# Patient Record
Sex: MALE | Race: BLACK OR AFRICAN AMERICAN | NOT HISPANIC OR LATINO | Employment: UNEMPLOYED | URBAN - METROPOLITAN AREA
[De-identification: names, ages, dates, MRNs, and addresses within clinical notes are randomized per-mention and may not be internally consistent; named-entity substitution may affect disease eponyms.]

---

## 2023-11-30 DIAGNOSIS — Z12.2 ENCOUNTER FOR SCREENING FOR LUNG CANCER: Primary | ICD-10-CM

## 2023-12-19 ENCOUNTER — HOSPITAL ENCOUNTER (OUTPATIENT)
Dept: RADIOLOGY | Facility: HOSPITAL | Age: 73
Discharge: HOME | End: 2023-12-19
Payer: MEDICARE

## 2023-12-19 DIAGNOSIS — Z12.2 ENCOUNTER FOR SCREENING FOR LUNG CANCER: ICD-10-CM

## 2023-12-19 PROCEDURE — 71271 CT THORAX LUNG CANCER SCR C-: CPT | Performed by: RADIOLOGY

## 2023-12-19 PROCEDURE — 71271 CT THORAX LUNG CANCER SCR C-: CPT

## 2024-02-01 DIAGNOSIS — Z12.2 ENCOUNTER FOR SCREENING FOR LUNG CANCER: Primary | ICD-10-CM

## 2024-08-11 ENCOUNTER — APPOINTMENT (OUTPATIENT)
Dept: RADIOLOGY | Facility: HOSPITAL | Age: 74
DRG: 682 | End: 2024-08-11
Payer: MEDICARE

## 2024-08-11 ENCOUNTER — APPOINTMENT (OUTPATIENT)
Dept: CARDIOLOGY | Facility: HOSPITAL | Age: 74
DRG: 682 | End: 2024-08-11
Payer: MEDICARE

## 2024-08-11 ENCOUNTER — HOSPITAL ENCOUNTER (INPATIENT)
Facility: HOSPITAL | Age: 74
End: 2024-08-11
Attending: EMERGENCY MEDICINE | Admitting: HOSPITALIST
Payer: MEDICARE

## 2024-08-11 VITALS
RESPIRATION RATE: 15 BRPM | HEART RATE: 81 BPM | TEMPERATURE: 97.3 F | OXYGEN SATURATION: 97 % | SYSTOLIC BLOOD PRESSURE: 153 MMHG | DIASTOLIC BLOOD PRESSURE: 114 MMHG | HEIGHT: 70 IN | BODY MASS INDEX: 34.44 KG/M2

## 2024-08-11 DIAGNOSIS — I10 PRIMARY HYPERTENSION: ICD-10-CM

## 2024-08-11 DIAGNOSIS — I50.33 ACUTE ON CHRONIC DIASTOLIC (CONGESTIVE) HEART FAILURE (MULTI): ICD-10-CM

## 2024-08-11 DIAGNOSIS — I50.9 ACUTE CONGESTIVE HEART FAILURE, UNSPECIFIED HEART FAILURE TYPE (MULTI): ICD-10-CM

## 2024-08-11 DIAGNOSIS — N17.9 ACUTE RENAL FAILURE, UNSPECIFIED ACUTE RENAL FAILURE TYPE (CMS-HCC): Primary | ICD-10-CM

## 2024-08-11 DIAGNOSIS — I21.4 NON-ST ELEVATION (NSTEMI) MYOCARDIAL INFARCTION (MULTI): ICD-10-CM

## 2024-08-11 DIAGNOSIS — M79.89 LEG SWELLING: ICD-10-CM

## 2024-08-11 DIAGNOSIS — K75.9 HEPATITIS: ICD-10-CM

## 2024-08-11 DIAGNOSIS — I16.1 HYPERTENSIVE EMERGENCY: ICD-10-CM

## 2024-08-11 DIAGNOSIS — F51.02 ADJUSTMENT INSOMNIA: ICD-10-CM

## 2024-08-11 DIAGNOSIS — F17.200 SMOKING: ICD-10-CM

## 2024-08-11 LAB
ACANTHOCYTES BLD QL SMEAR: ABNORMAL
ALBUMIN SERPL-MCNC: 4.1 G/DL (ref 3.5–5)
ALBUMIN SERPL-MCNC: 4.2 G/DL (ref 3.5–5)
ALP BLD-CCNC: 85 U/L (ref 35–125)
ALP BLD-CCNC: 87 U/L (ref 35–125)
ALT SERPL-CCNC: 314 U/L (ref 5–40)
ALT SERPL-CCNC: 368 U/L (ref 5–40)
ANION GAP SERPL CALC-SCNC: >19 MMOL/L
ANION GAP SERPL CALC-SCNC: >19 MMOL/L
APPEARANCE UR: CLEAR
APTT PPP: 29 SECONDS (ref 22–32.5)
AST SERPL-CCNC: 436 U/L (ref 5–40)
AST SERPL-CCNC: 544 U/L (ref 5–40)
BASOPHILS # BLD MANUAL: 0 X10*3/UL (ref 0–0.1)
BASOPHILS NFR BLD MANUAL: 0 %
BILIRUB DIRECT SERPL-MCNC: 0.8 MG/DL (ref 0–0.2)
BILIRUB SERPL-MCNC: 1.7 MG/DL (ref 0.1–1.2)
BILIRUB SERPL-MCNC: 1.9 MG/DL (ref 0.1–1.2)
BILIRUB UR STRIP.AUTO-MCNC: NEGATIVE MG/DL
BUN SERPL-MCNC: 44 MG/DL (ref 8–25)
BUN SERPL-MCNC: 44 MG/DL (ref 8–25)
BURR CELLS BLD QL SMEAR: ABNORMAL
CALCIUM SERPL-MCNC: 9.3 MG/DL (ref 8.5–10.4)
CALCIUM SERPL-MCNC: 9.8 MG/DL (ref 8.5–10.4)
CHLORIDE SERPL-SCNC: 98 MMOL/L (ref 97–107)
CHLORIDE SERPL-SCNC: 98 MMOL/L (ref 97–107)
CHOLEST SERPL-MCNC: 130 MG/DL (ref 133–200)
CHOLEST/HDLC SERPL: 5.9 {RATIO}
CK SERPL-CCNC: 308 U/L (ref 24–195)
CO2 SERPL-SCNC: 15 MMOL/L (ref 24–31)
CO2 SERPL-SCNC: 16 MMOL/L (ref 24–31)
COLOR UR: ABNORMAL
CREAT SERPL-MCNC: 3.9 MG/DL (ref 0.4–1.6)
CREAT SERPL-MCNC: 4.1 MG/DL (ref 0.4–1.6)
D DIMER PPP FEU-MCNC: 1.99 MG/L FEU (ref 0.19–0.5)
EGFRCR SERPLBLD CKD-EPI 2021: 15 ML/MIN/1.73M*2
EGFRCR SERPLBLD CKD-EPI 2021: 16 ML/MIN/1.73M*2
EOSINOPHIL # BLD MANUAL: 0 X10*3/UL (ref 0–0.4)
EOSINOPHIL NFR BLD MANUAL: 0 %
ERYTHROCYTE [DISTWIDTH] IN BLOOD BY AUTOMATED COUNT: 14.8 % (ref 11.5–14.5)
ERYTHROCYTE [DISTWIDTH] IN BLOOD BY AUTOMATED COUNT: 14.8 % (ref 11.5–14.5)
EST. AVERAGE GLUCOSE BLD GHB EST-MCNC: 91 MG/DL
GLUCOSE SERPL-MCNC: 104 MG/DL (ref 65–99)
GLUCOSE SERPL-MCNC: 122 MG/DL (ref 65–99)
GLUCOSE UR STRIP.AUTO-MCNC: NORMAL MG/DL
HAV IGM SER QL: NONREACTIVE
HBA1C MFR BLD: 4.8 %
HBV CORE IGM SER QL: NONREACTIVE
HBV SURFACE AG SERPL QL IA: NONREACTIVE
HCT VFR BLD AUTO: 38.4 % (ref 41–52)
HCT VFR BLD AUTO: 40.1 % (ref 41–52)
HCV AB SER QL: REACTIVE
HDLC SERPL-MCNC: 22 MG/DL
HGB BLD-MCNC: 13.2 G/DL (ref 13.5–17.5)
HGB BLD-MCNC: 13.7 G/DL (ref 13.5–17.5)
HOLD SPECIMEN: NORMAL
IMM GRANULOCYTES # BLD AUTO: 0.14 X10*3/UL (ref 0–0.5)
IMM GRANULOCYTES NFR BLD AUTO: 1.2 % (ref 0–0.9)
KETONES UR STRIP.AUTO-MCNC: NEGATIVE MG/DL
LDLC SERPL CALC-MCNC: 80 MG/DL (ref 65–130)
LEUKOCYTE ESTERASE UR QL STRIP.AUTO: NEGATIVE
LYMPHOCYTES # BLD MANUAL: 1.16 X10*3/UL (ref 0.8–3)
LYMPHOCYTES NFR BLD MANUAL: 10 %
MAGNESIUM SERPL-MCNC: 2.2 MG/DL (ref 1.6–3.1)
MCH RBC QN AUTO: 33.1 PG (ref 26–34)
MCH RBC QN AUTO: 33.3 PG (ref 26–34)
MCHC RBC AUTO-ENTMCNC: 34.2 G/DL (ref 32–36)
MCHC RBC AUTO-ENTMCNC: 34.4 G/DL (ref 32–36)
MCV RBC AUTO: 97 FL (ref 80–100)
MCV RBC AUTO: 97 FL (ref 80–100)
METAMYELOCYTES # BLD MANUAL: 0.12 X10*3/UL
METAMYELOCYTES NFR BLD MANUAL: 1 %
MONOCYTES # BLD MANUAL: 0.35 X10*3/UL (ref 0.05–0.8)
MONOCYTES NFR BLD MANUAL: 3 %
MUCOUS THREADS #/AREA URNS AUTO: NORMAL /LPF
NEUTS SEG # BLD MANUAL: 9.98 X10*3/UL (ref 1.6–5)
NEUTS SEG NFR BLD MANUAL: 86 %
NITRITE UR QL STRIP.AUTO: NEGATIVE
NRBC BLD-RTO: 0.8 /100 WBCS (ref 0–0)
NRBC BLD-RTO: 1 /100 WBCS (ref 0–0)
NT-PROBNP SERPL-MCNC: ABNORMAL PG/ML (ref 0–229)
PH UR STRIP.AUTO: 5.5 [PH]
PLATELET # BLD AUTO: 136 X10*3/UL (ref 150–450)
PLATELET # BLD AUTO: 144 X10*3/UL (ref 150–450)
POLYCHROMASIA BLD QL SMEAR: ABNORMAL
POTASSIUM SERPL-SCNC: 3.8 MMOL/L (ref 3.4–5.1)
POTASSIUM SERPL-SCNC: 3.9 MMOL/L (ref 3.4–5.1)
PROT SERPL-MCNC: 7.1 G/DL (ref 5.9–7.9)
PROT SERPL-MCNC: 7.6 G/DL (ref 5.9–7.9)
PROT UR STRIP.AUTO-MCNC: ABNORMAL MG/DL
RBC # BLD AUTO: 3.96 X10*6/UL (ref 4.5–5.9)
RBC # BLD AUTO: 4.14 X10*6/UL (ref 4.5–5.9)
RBC # UR STRIP.AUTO: ABNORMAL /UL
RBC #/AREA URNS AUTO: NORMAL /HPF
RBC MORPH BLD: ABNORMAL
SODIUM SERPL-SCNC: 136 MMOL/L (ref 133–145)
SODIUM SERPL-SCNC: 137 MMOL/L (ref 133–145)
SP GR UR STRIP.AUTO: 1.01
SQUAMOUS #/AREA URNS AUTO: NORMAL /HPF
TOTAL CELLS COUNTED BLD: 100
TRIGL SERPL-MCNC: 138 MG/DL (ref 40–150)
TROPONIN T SERPL-MCNC: 161 NG/L
TROPONIN T SERPL-MCNC: 171 NG/L
TROPONIN T SERPL-MCNC: 176 NG/L
UROBILINOGEN UR STRIP.AUTO-MCNC: NORMAL MG/DL
WBC # BLD AUTO: 11.6 X10*3/UL (ref 4.4–11.3)
WBC # BLD AUTO: 11.8 X10*3/UL (ref 4.4–11.3)
WBC #/AREA URNS AUTO: NORMAL /HPF

## 2024-08-11 PROCEDURE — 80053 COMPREHEN METABOLIC PANEL: CPT | Performed by: HOSPITALIST

## 2024-08-11 PROCEDURE — 85027 COMPLETE CBC AUTOMATED: CPT | Performed by: HOSPITALIST

## 2024-08-11 PROCEDURE — 99232 SBSQ HOSP IP/OBS MODERATE 35: CPT | Performed by: INTERNAL MEDICINE

## 2024-08-11 PROCEDURE — 2060000001 HC INTERMEDIATE ICU ROOM DAILY

## 2024-08-11 PROCEDURE — 71045 X-RAY EXAM CHEST 1 VIEW: CPT

## 2024-08-11 PROCEDURE — 93005 ELECTROCARDIOGRAM TRACING: CPT

## 2024-08-11 PROCEDURE — 36415 COLL VENOUS BLD VENIPUNCTURE: CPT | Performed by: EMERGENCY MEDICINE

## 2024-08-11 PROCEDURE — 83718 ASSAY OF LIPOPROTEIN: CPT | Performed by: HOSPITALIST

## 2024-08-11 PROCEDURE — 82550 ASSAY OF CK (CPK): CPT | Performed by: HOSPITALIST

## 2024-08-11 PROCEDURE — 85007 BL SMEAR W/DIFF WBC COUNT: CPT | Performed by: EMERGENCY MEDICINE

## 2024-08-11 PROCEDURE — 85300 ANTITHROMBIN III ACTIVITY: CPT | Performed by: EMERGENCY MEDICINE

## 2024-08-11 PROCEDURE — 70450 CT HEAD/BRAIN W/O DYE: CPT

## 2024-08-11 PROCEDURE — 87522 HEPATITIS C REVRS TRNSCRPJ: CPT | Mod: WESLAB | Performed by: HOSPITALIST

## 2024-08-11 PROCEDURE — 83835 ASSAY OF METANEPHRINES: CPT | Performed by: INTERNAL MEDICINE

## 2024-08-11 PROCEDURE — 76705 ECHO EXAM OF ABDOMEN: CPT

## 2024-08-11 PROCEDURE — 84244 ASSAY OF RENIN: CPT | Performed by: INTERNAL MEDICINE

## 2024-08-11 PROCEDURE — 76705 ECHO EXAM OF ABDOMEN: CPT | Performed by: RADIOLOGY

## 2024-08-11 PROCEDURE — 99291 CRITICAL CARE FIRST HOUR: CPT | Performed by: HOSPITALIST

## 2024-08-11 PROCEDURE — 2500000001 HC RX 250 WO HCPCS SELF ADMINISTERED DRUGS (ALT 637 FOR MEDICARE OP): Performed by: NURSE PRACTITIONER

## 2024-08-11 PROCEDURE — 85730 THROMBOPLASTIN TIME PARTIAL: CPT | Performed by: EMERGENCY MEDICINE

## 2024-08-11 PROCEDURE — 82088 ASSAY OF ALDOSTERONE: CPT | Performed by: INTERNAL MEDICINE

## 2024-08-11 PROCEDURE — 82248 BILIRUBIN DIRECT: CPT | Performed by: INTERNAL MEDICINE

## 2024-08-11 PROCEDURE — 99291 CRITICAL CARE FIRST HOUR: CPT | Mod: 25 | Performed by: EMERGENCY MEDICINE

## 2024-08-11 PROCEDURE — 99285 EMERGENCY DEPT VISIT HI MDM: CPT | Mod: 25

## 2024-08-11 PROCEDURE — 76770 US EXAM ABDO BACK WALL COMP: CPT

## 2024-08-11 PROCEDURE — 93971 EXTREMITY STUDY: CPT | Performed by: RADIOLOGY

## 2024-08-11 PROCEDURE — 76770 US EXAM ABDO BACK WALL COMP: CPT | Performed by: RADIOLOGY

## 2024-08-11 PROCEDURE — 84484 ASSAY OF TROPONIN QUANT: CPT | Performed by: EMERGENCY MEDICINE

## 2024-08-11 PROCEDURE — 2500000004 HC RX 250 GENERAL PHARMACY W/ HCPCS (ALT 636 FOR OP/ED): Performed by: INTERNAL MEDICINE

## 2024-08-11 PROCEDURE — 83036 HEMOGLOBIN GLYCOSYLATED A1C: CPT | Performed by: HOSPITALIST

## 2024-08-11 PROCEDURE — 80074 ACUTE HEPATITIS PANEL: CPT | Mod: WESLAB | Performed by: HOSPITALIST

## 2024-08-11 PROCEDURE — 81001 URINALYSIS AUTO W/SCOPE: CPT | Performed by: HOSPITALIST

## 2024-08-11 PROCEDURE — 2500000001 HC RX 250 WO HCPCS SELF ADMINISTERED DRUGS (ALT 637 FOR MEDICARE OP): Performed by: HOSPITALIST

## 2024-08-11 PROCEDURE — 99223 1ST HOSP IP/OBS HIGH 75: CPT | Performed by: NURSE PRACTITIONER

## 2024-08-11 PROCEDURE — 83735 ASSAY OF MAGNESIUM: CPT | Performed by: EMERGENCY MEDICINE

## 2024-08-11 PROCEDURE — 2500000001 HC RX 250 WO HCPCS SELF ADMINISTERED DRUGS (ALT 637 FOR MEDICARE OP): Performed by: INTERNAL MEDICINE

## 2024-08-11 PROCEDURE — 85027 COMPLETE CBC AUTOMATED: CPT | Performed by: EMERGENCY MEDICINE

## 2024-08-11 PROCEDURE — 2500000004 HC RX 250 GENERAL PHARMACY W/ HCPCS (ALT 636 FOR OP/ED): Performed by: EMERGENCY MEDICINE

## 2024-08-11 PROCEDURE — 83880 ASSAY OF NATRIURETIC PEPTIDE: CPT | Performed by: EMERGENCY MEDICINE

## 2024-08-11 PROCEDURE — 71045 X-RAY EXAM CHEST 1 VIEW: CPT | Performed by: RADIOLOGY

## 2024-08-11 PROCEDURE — 93010 ELECTROCARDIOGRAM REPORT: CPT | Performed by: INTERNAL MEDICINE

## 2024-08-11 PROCEDURE — 70450 CT HEAD/BRAIN W/O DYE: CPT | Performed by: RADIOLOGY

## 2024-08-11 PROCEDURE — 2500000004 HC RX 250 GENERAL PHARMACY W/ HCPCS (ALT 636 FOR OP/ED): Performed by: HOSPITALIST

## 2024-08-11 PROCEDURE — 93970 EXTREMITY STUDY: CPT

## 2024-08-11 PROCEDURE — 80053 COMPREHEN METABOLIC PANEL: CPT | Performed by: EMERGENCY MEDICINE

## 2024-08-11 RX ORDER — AMLODIPINE BESYLATE 10 MG/1
10 TABLET ORAL DAILY
Status: DISPENSED | OUTPATIENT
Start: 2024-08-11

## 2024-08-11 RX ORDER — POLYETHYLENE GLYCOL 3350 17 G/17G
17 POWDER, FOR SOLUTION ORAL DAILY PRN
Status: ACTIVE | OUTPATIENT
Start: 2024-08-11

## 2024-08-11 RX ORDER — NITROGLYCERIN 20 MG/100ML
1000 INJECTION INTRAVENOUS ONCE
Status: DISCONTINUED | OUTPATIENT
Start: 2024-08-11 | End: 2024-08-11 | Stop reason: ENTERED-IN-ERROR

## 2024-08-11 RX ORDER — ACETAMINOPHEN 325 MG/1
650 TABLET ORAL EVERY 6 HOURS PRN
Status: ACTIVE | OUTPATIENT
Start: 2024-08-11

## 2024-08-11 RX ORDER — HEPARIN SODIUM 5000 [USP'U]/ML
3000-6000 INJECTION, SOLUTION INTRAVENOUS; SUBCUTANEOUS AS NEEDED
Status: ACTIVE | OUTPATIENT
Start: 2024-08-11

## 2024-08-11 RX ORDER — SODIUM BICARBONATE 650 MG/1
650 TABLET ORAL 2 TIMES DAILY
Status: DISPENSED | OUTPATIENT
Start: 2024-08-11

## 2024-08-11 RX ORDER — FUROSEMIDE 10 MG/ML
40 INJECTION INTRAMUSCULAR; INTRAVENOUS ONCE
Status: COMPLETED | OUTPATIENT
Start: 2024-08-11 | End: 2024-08-11

## 2024-08-11 RX ORDER — IBUPROFEN 200 MG
1 TABLET ORAL DAILY
Status: DISPENSED | OUTPATIENT
Start: 2024-08-11

## 2024-08-11 RX ORDER — ONDANSETRON HYDROCHLORIDE 2 MG/ML
4 INJECTION, SOLUTION INTRAVENOUS EVERY 6 HOURS PRN
Status: ACTIVE | OUTPATIENT
Start: 2024-08-11

## 2024-08-11 RX ORDER — CARVEDILOL 12.5 MG/1
12.5 TABLET ORAL
Status: DISPENSED | OUTPATIENT
Start: 2024-08-11

## 2024-08-11 RX ORDER — HYDRALAZINE HYDROCHLORIDE 25 MG/1
25 TABLET, FILM COATED ORAL 3 TIMES DAILY
Status: DISPENSED | OUTPATIENT
Start: 2024-08-11

## 2024-08-11 RX ORDER — ASPIRIN 81 MG/1
81 TABLET ORAL DAILY
Status: DISPENSED | OUTPATIENT
Start: 2024-08-11

## 2024-08-11 RX ORDER — HEPARIN SODIUM 10000 [USP'U]/100ML
0-4500 INJECTION, SOLUTION INTRAVENOUS CONTINUOUS
Status: DISCONTINUED | OUTPATIENT
Start: 2024-08-11 | End: 2024-08-11

## 2024-08-11 RX ORDER — HEPARIN SODIUM 5000 [USP'U]/ML
80 INJECTION, SOLUTION INTRAVENOUS; SUBCUTANEOUS ONCE
Status: COMPLETED | OUTPATIENT
Start: 2024-08-11 | End: 2024-08-11

## 2024-08-11 RX ORDER — HYDRALAZINE HYDROCHLORIDE 10 MG/1
10 TABLET, FILM COATED ORAL 3 TIMES DAILY
Status: DISCONTINUED | OUTPATIENT
Start: 2024-08-11 | End: 2024-08-11

## 2024-08-11 RX ADMIN — FUROSEMIDE 40 MG: 10 INJECTION, SOLUTION INTRAMUSCULAR; INTRAVENOUS at 03:58

## 2024-08-11 RX ADMIN — ASPIRIN 81 MG: 81 TABLET, COATED ORAL at 09:42

## 2024-08-11 RX ADMIN — HEPARIN SODIUM 2000 UNITS/HR: 10000 INJECTION, SOLUTION INTRAVENOUS at 03:58

## 2024-08-11 RX ADMIN — CARVEDILOL 12.5 MG: 12.5 TABLET, FILM COATED ORAL at 06:37

## 2024-08-11 RX ADMIN — CARVEDILOL 12.5 MG: 12.5 TABLET, FILM COATED ORAL at 16:53

## 2024-08-11 RX ADMIN — HYDRALAZINE HYDROCHLORIDE 25 MG: 25 TABLET ORAL at 18:07

## 2024-08-11 RX ADMIN — HYDRALAZINE HYDROCHLORIDE 25 MG: 25 TABLET ORAL at 23:15

## 2024-08-11 RX ADMIN — SODIUM BICARBONATE 650 MG: 650 TABLET ORAL at 12:26

## 2024-08-11 RX ADMIN — HEPARIN SODIUM 8750 UNITS: 5000 INJECTION, SOLUTION INTRAVENOUS; SUBCUTANEOUS at 03:58

## 2024-08-11 RX ADMIN — AMLODIPINE BESYLATE 10 MG: 10 TABLET ORAL at 05:13

## 2024-08-11 RX ADMIN — SODIUM BICARBONATE 650 MG: 650 TABLET ORAL at 23:15

## 2024-08-11 RX ADMIN — HYDRALAZINE HYDROCHLORIDE 10 MG: 10 TABLET ORAL at 09:49

## 2024-08-11 ASSESSMENT — ENCOUNTER SYMPTOMS
DIZZINESS: 0
HEMATURIA: 0
NAUSEA: 0
POLYPHAGIA: 0
VOMITING: 0
POLYDIPSIA: 0
HEADACHES: 1
CONFUSION: 0
APPETITE CHANGE: 1
COLOR CHANGE: 0
DYSURIA: 0
FATIGUE: 1
MYALGIAS: 0
SINUS PAIN: 0
DIARRHEA: 0
WEAKNESS: 1
DIARRHEA: 1
CHEST TIGHTNESS: 0
HALLUCINATIONS: 0
ARTHRALGIAS: 0
ABDOMINAL PAIN: 0
CONSTIPATION: 0
SHORTNESS OF BREATH: 1
CHILLS: 0
FEVER: 0

## 2024-08-11 ASSESSMENT — LIFESTYLE VARIABLES
TOTAL SCORE: 0
EVER FELT BAD OR GUILTY ABOUT YOUR DRINKING: NO
EVER HAD A DRINK FIRST THING IN THE MORNING TO STEADY YOUR NERVES TO GET RID OF A HANGOVER: NO
HAVE PEOPLE ANNOYED YOU BY CRITICIZING YOUR DRINKING: NO
HAVE YOU EVER FELT YOU SHOULD CUT DOWN ON YOUR DRINKING: NO

## 2024-08-11 ASSESSMENT — PAIN - FUNCTIONAL ASSESSMENT: PAIN_FUNCTIONAL_ASSESSMENT: 0-10

## 2024-08-11 ASSESSMENT — COLUMBIA-SUICIDE SEVERITY RATING SCALE - C-SSRS
2. HAVE YOU ACTUALLY HAD ANY THOUGHTS OF KILLING YOURSELF?: NO
6. HAVE YOU EVER DONE ANYTHING, STARTED TO DO ANYTHING, OR PREPARED TO DO ANYTHING TO END YOUR LIFE?: NO
1. IN THE PAST MONTH, HAVE YOU WISHED YOU WERE DEAD OR WISHED YOU COULD GO TO SLEEP AND NOT WAKE UP?: NO

## 2024-08-11 ASSESSMENT — PAIN SCALES - GENERAL: PAINLEVEL_OUTOF10: 0 - NO PAIN

## 2024-08-11 NOTE — CONSULTS
Inpatient consult to Cardiology  Consult performed by: MICHEL Gomez-CNP  Consult ordered by: Mary aBtres DO  Reason for consult: Elevated high-sensitivity troponin, hypertensive emergency        History Of Present Illness:    Estuardo El is a 74 y.o. male presenting with generalized weakness and fatigue.  No cardiologist.  Past medical history of diastolic heart failure, hepatitis C, and hypertensive disorder, tobacco use.  Patient appears to be a poor historian does not recall these previous diagnosis and is not taking medications at home.  Presents with shortness of breath as well as significant fatigue and exertional shortness of breath over the past few weeks.  Lately he has become incontinent because he is unable to get up to the bathroom.  Patient reports no chest pain, palpitations or feeling of rapid heart rate.  EKG in the emergency department is a sinus tachycardia at 105 beats per minute.  Chest x-ray clear.  proBNP 70,000, high-sensitivity troponin values of 171 and 161.  Creatinine 4.1.  Hemoglobin 13.2.  Patient presents with systolic blood pressure over 200 and with initial treatment this improved to 199/143.  In the emergency department patient received 1 dose of IV furosemide and a bolus of heparin, was placed on a heparin drip and admitted on telemetry for further testing and treatment.      Last Recorded Vitals:  Vitals:    08/11/24 0400 08/11/24 0513 08/11/24 0543 08/11/24 0637   BP:  (!) 204/164 (!) 193/161 (!) 199/143   BP Location:   Right arm    Patient Position:   Lying    Pulse: (!) 111 (!) 109 (!) 107 (!) 107   Resp: (!) 27  (!) 21    Temp:       TempSrc:       SpO2: 99%  97%    Height:           Last Labs:  CBC - 8/11/2024:  4:15 AM  11.8 13.2 136    38.4      CMP - 8/11/2024:  4:15 AM  9.3 7.1 544 --- 1.7   _ 4.1 368 85      PTT - 8/11/2024:  1:20 AM  _   _ 29.0     BNP   Date/Time Value Ref Range Status   07/19/2020 06:55  (H) 0 - 99 pg/mL Final      Comment:     .  <100 pg/mL - Heart failure unlikely  100-299 pg/mL - Intermediate probability of acute heart  .               failure exacerbation. Correlate with clinical  .               context and patient history.    >=300 pg/mL - Heart Failure likely. Correlate with clinical  .               context and patient history.  BNP testing is performed using different testing   methodology at [facility] than at other   [facility]. Direct result comparisons should   only be made within the same method.       Hemoglobin A1C   Date/Time Value Ref Range Status   08/11/2024 01:20 AM 4.8 See below % Final   02/27/2019 11:09 AM 5.3 % Final     Comment:          Diagnosis of Diabetes-Adults   Non-Diabetic: < or = 5.6%   Increased risk for developing diabetes: 5.7-6.4%   Diagnostic of diabetes: > or = 6.5%  .       Monitoring of Diabetes                Age (y)     Therapeutic Goal (%)   Adults:          >18           <7.0   Pediatrics:    13-18           <7.5                   7-12           <8.0                   0- 6            7.5-8.5   American Diabetes Association. Diabetes Care 33(S1), Jan 2010.       LDL Calculated   Date/Time Value Ref Range Status   08/11/2024 04:15 AM 80 65 - 130 mg/dL Final     VLDL   Date/Time Value Ref Range Status   02/27/2019 11:09 AM 16 0 - 40 mg/dL Final      Results for orders placed or performed during the hospital encounter of 08/11/24 (from the past 24 hour(s))   CBC and Auto Differential   Result Value Ref Range    WBC 11.6 (H) 4.4 - 11.3 x10*3/uL    nRBC 1.0 (H) 0.0 - 0.0 /100 WBCs    RBC 4.14 (L) 4.50 - 5.90 x10*6/uL    Hemoglobin 13.7 13.5 - 17.5 g/dL    Hematocrit 40.1 (L) 41.0 - 52.0 %    MCV 97 80 - 100 fL    MCH 33.1 26.0 - 34.0 pg    MCHC 34.2 32.0 - 36.0 g/dL    RDW 14.8 (H) 11.5 - 14.5 %    Platelets 144 (L) 150 - 450 x10*3/uL    Immature Granulocytes %, Automated 1.2 (H) 0.0 - 0.9 %    Immature Granulocytes Absolute, Automated 0.14 0.00 - 0.50 x10*3/uL    Comprehensive Metabolic Panel   Result Value Ref Range    Glucose 122 (H) 65 - 99 mg/dL    Sodium 137 133 - 145 mmol/L    Potassium 3.8 3.4 - 5.1 mmol/L    Chloride 98 97 - 107 mmol/L    Bicarbonate 15 (L) 24 - 31 mmol/L    Urea Nitrogen 44 (H) 8 - 25 mg/dL    Creatinine 3.90 (H) 0.40 - 1.60 mg/dL    eGFR 16 (L) >60 mL/min/1.73m*2    Calcium 9.8 8.5 - 10.4 mg/dL    Albumin 4.2 3.5 - 5.0 g/dL    Alkaline Phosphatase 87 35 - 125 U/L    Total Protein 7.6 5.9 - 7.9 g/dL     (H) 5 - 40 U/L    Bilirubin, Total 1.9 (H) 0.1 - 1.2 mg/dL     (H) 5 - 40 U/L    Anion Gap >19 (H) <=19 mmol/L   Magnesium   Result Value Ref Range    Magnesium 2.20 1.60 - 3.10 mg/dL   NT Pro-BNP   Result Value Ref Range    PROBNP >70,000 (H) 0 - 229 pg/mL   D-dimer, quantitative   Result Value Ref Range    D-Dimer Non VTE, Quant (mg/L FEU) 1.99 (H) 0.19 - 0.50 mg/L FEU   Serial Troponin, Initial ([facility])   Result Value Ref Range    Troponin T, High Sensitivity 171 (HH) <=14 ng/L   Manual Differential   Result Value Ref Range    Neutrophils %, Manual 86.0 40.0 - 80.0 %    Lymphocytes %, Manual 10.0 13.0 - 44.0 %    Monocytes %, Manual 3.0 2.0 - 10.0 %    Eosinophils %, Manual 0.0 0.0 - 6.0 %    Basophils %, Manual 0.0 0.0 - 2.0 %    Metamyelocytes %, Manual 1.0 0.0 - 0.0 %    Seg Neutrophils Absolute, Manual 9.98 (H) 1.60 - 5.00 x10*3/uL    Lymphocytes Absolute, Manual 1.16 0.80 - 3.00 x10*3/uL    Monocytes Absolute, Manual 0.35 0.05 - 0.80 x10*3/uL    Eosinophils Absolute, Manual 0.00 0.00 - 0.40 x10*3/uL    Basophils Absolute, Manual 0.00 0.00 - 0.10 x10*3/uL    Metamyelocytes Absolute, Manual 0.12 0.00 - 0.00 x10*3/uL    Total Cells Counted 100     RBC Morphology See Below     Polychromasia Mild     Laurel Cells Few     Acanthocytes Few    aPTT   Result Value Ref Range    aPTT 29.0 22.0 - 32.5 seconds   Hemoglobin A1c   Result Value Ref Range    Hemoglobin A1C 4.8 See below %    Estimated Average Glucose 91 Not Established mg/dL    Serial Troponin, 2 Hour ([facility])   Result Value Ref Range    Troponin T, High Sensitivity 161 (HH) <=14 ng/L   Creatine Kinase   Result Value Ref Range    Creatine Kinase 308 (H) 24 - 195 U/L   CBC   Result Value Ref Range    WBC 11.8 (H) 4.4 - 11.3 x10*3/uL    nRBC 0.8 (H) 0.0 - 0.0 /100 WBCs    RBC 3.96 (L) 4.50 - 5.90 x10*6/uL    Hemoglobin 13.2 (L) 13.5 - 17.5 g/dL    Hematocrit 38.4 (L) 41.0 - 52.0 %    MCV 97 80 - 100 fL    MCH 33.3 26.0 - 34.0 pg    MCHC 34.4 32.0 - 36.0 g/dL    RDW 14.8 (H) 11.5 - 14.5 %    Platelets 136 (L) 150 - 450 x10*3/uL   Comprehensive Metabolic Panel   Result Value Ref Range    Glucose 104 (H) 65 - 99 mg/dL    Sodium 136 133 - 145 mmol/L    Potassium 3.9 3.4 - 5.1 mmol/L    Chloride 98 97 - 107 mmol/L    Bicarbonate 16 (L) 24 - 31 mmol/L    Urea Nitrogen 44 (H) 8 - 25 mg/dL    Creatinine 4.10 (H) 0.40 - 1.60 mg/dL    eGFR 15 (L) >60 mL/min/1.73m*2    Calcium 9.3 8.5 - 10.4 mg/dL    Albumin 4.1 3.5 - 5.0 g/dL    Alkaline Phosphatase 85 35 - 125 U/L    Total Protein 7.1 5.9 - 7.9 g/dL     (H) 5 - 40 U/L    Bilirubin, Total 1.7 (H) 0.1 - 1.2 mg/dL     (H) 5 - 40 U/L    Anion Gap >19 (H) <=19 mmol/L   Lipid Panel   Result Value Ref Range    Cholesterol 130 (L) 133 - 200 mg/dL    HDL-Cholesterol 22.0 (L) >40.0 mg/dL    Cholesterol/HDL Ratio 5.9 SEE COMMENT    LDL Calculated 80 65 - 130 mg/dL    Triglycerides 138 40 - 150 mg/dL       Last I/O:  No intake/output data recorded.    Past Cardiology Tests (Last 3 Years):  EKG: Sinus tachycardia 105 bpm      Past Medical History:  He has a past medical history of CHF (congestive heart failure) (Multi), Hepatitis C, and Hypertension.  Tobacco use    Past Surgical History:  He has a past surgical history that includes Back surgery.      Social History:  He reports that he has been smoking cigarettes. He started smoking about 57 years ago. He has a 57.6 pack-year smoking history. He has never used smokeless tobacco. He reports that he  does not currently use alcohol. No history on file for drug use.    Family History:  Family History   Problem Relation Name Age of Onset    Heart attack Father      Heart attack Brother          Allergies:  Patient has no known allergies.    Inpatient Medications:  Scheduled medications   Medication Dose Route Frequency    amLODIPine  10 mg oral Daily    aspirin  81 mg oral Daily    carvedilol  12.5 mg oral BID    hydrALAZINE  10 mg oral TID    nicotine  1 patch transdermal Daily    perflutren lipid microspheres  0.5-10 mL of dilution intravenous Once in imaging    perflutren protein A microsphere  0.5 mL intravenous Once in imaging    sulfur hexafluoride microsphr  2 mL intravenous Once in imaging     PRN medications   Medication    acetaminophen    heparin (porcine)    ondansetron    polyethylene glycol     Continuous Medications   Medication Dose Last Rate     Outpatient Medications:  No current outpatient medications    Physical Exam:  General: alert, oriented x 3, ill-appearing  HEENT: normal cephalic, atraumatic, no scleral icterus  Neck: No JVD, bruit or thrill, masses or tenderness   Heart: S1/S2, Rate 90, Rhythm regular, no s3 or s4, no murmur, thrill, or heaves at PMI.   Lungs: Clear, equal expansion and excursion, no wheezes, crackles, rhales or rhonci.  Room air.  No conversational dyspnea appreciated.  No tachypnea.  No pain with deep aspiration  Abdomen: Thin, bowel sounds x 4, soft, non-tender   Genitourinary: deferred   Extremities: No significant upper or lower extremity edema appreciated       Assessment/Plan     Hypertensive emergency  Acute kidney injury  Elevated high sensitive troponin  Generalized weakness  Tobacco use  Chronic diastolic heart failure    Overall impression:    8/11: Consulted for congestive heart failure and NSTEMI.  The patient's symptoms are generalized weakness and fatigue.  Reports no chest pain and some mild shortness of breath/fatigue over the past few weeks.  Chest  x-ray is clear.  There is no evidence of fluid volume overload on auscultation.  There is no JVD.  There is no peripheral edema.  Despite this his proBNP is 70,000.  Additionally he has noted to have an acute kidney injury with a creatinine of 4.1 which is new for this patient.  Upon further review the patient has severely hypertensive with a systolic averaging over 200.  There is a history of hypertension, however the patient does not take medications in the outpatient setting.  It appears the patient is having a hypertensive emergency resulting in elevated proBNP and elevated high-sensitivity troponin; and perhaps hypertensive cardiomyopathy.  Certainly there is also suspicion given the elevated blood pressure of renal artery stenosis.  Will check echocardiogram.  Renal artery stenosis study has been ordered.  At this point given lack of chest pain symptoms we will stop heparin drip.  Agree with amlodipine as first agent.  Would avoid ACEs and ARB's.  Agree with carvedilol.  I have added hydralazine for further blood pressure management.  Patient is significantly decompensated from a cardiac perspective given his hypertensive emergency, and a high risk for further decompensation.  Will follow with you.       Code Status:  Full Code    I spent 80 minutes in the professional and overall care of this patient.        Juan J Gaines, APRN-CNP

## 2024-08-11 NOTE — ED PROVIDER NOTES
HPI   Chief Complaint   Patient presents with    Weakness, Gen     Generalized weakness last two days, with increased leg swelling    Leg Swelling       74-year-old male with a history of nicotine abuse comes to the emergency department brought in by his son with a chief complaint of shortness of breath.  Son states that he visits his father daily to check up on him and today noticed an acute deterioration.  He states that the air conditioner was not working correctly and it was giving out heat.  He is concerned that his father is dehydrated.  The patient states that his legs have been getting more swollen for an unknown amount of time and that he has been having worsening dyspnea on exertion and orthopnea.  He states today he was unable to lie down without getting short of breath.      History provided by:  Patient   used: No            Patient History   Past Medical History:   Diagnosis Date    CHF (congestive heart failure) (Multi)     Hepatitis C     Hypertension      Past Surgical History:   Procedure Laterality Date    BACK SURGERY       Family History   Problem Relation Name Age of Onset    Heart attack Father      Heart attack Brother       Social History     Tobacco Use    Smoking status: Every Day     Current packs/day: 1.00     Average packs/day: 1 pack/day for 57.7 years (57.7 ttl pk-yrs)     Types: Cigarettes     Start date: 1967    Smokeless tobacco: Never   Substance Use Topics    Alcohol use: Not Currently    Drug use: Not on file       Physical Exam   ED Triage Vitals [08/11/24 0036]   Temperature Heart Rate Respirations BP   36.3 °C (97.3 °F) (!) 116 17 (!) 189/152      Pulse Ox Temp Source Heart Rate Source Patient Position   100 % Temporal Monitor --      BP Location FiO2 (%)     -- --       Physical Exam  Vitals and nursing note reviewed.   Constitutional:       General: He is not in acute distress.     Appearance: He is well-developed.   HENT:      Head: Normocephalic and  atraumatic.   Eyes:      Conjunctiva/sclera: Conjunctivae normal.   Cardiovascular:      Rate and Rhythm: Regular rhythm. Tachycardia present.      Heart sounds: No murmur heard.  Pulmonary:      Effort: No respiratory distress.      Breath sounds: No stridor. Rales present. No wheezing or rhonchi.   Chest:      Chest wall: No tenderness.   Abdominal:      Palpations: Abdomen is soft.      Tenderness: There is no abdominal tenderness.   Musculoskeletal:      Cervical back: Neck supple.      Right lower leg: Edema present.      Left lower leg: Edema present.   Skin:     General: Skin is warm and dry.      Capillary Refill: Capillary refill takes less than 2 seconds.   Neurological:      Mental Status: He is alert.   Psychiatric:         Mood and Affect: Mood normal.           ED Course & MDM   ED Course as of 09/07/24 1014   Sun Aug 11, 2024   0345 ECG 12 lead  ECG performed on August 11, 2024 at 1:07 AM and interpreted by me at 1:11 AM showing a sinus tachycardia with a ventricular rate of 105, no axis deviation, LVH, no STEMI.  T wave inversions in 2 3 and aVF's as well as lateral leads with depressions concerning for inferior lateral ischemia.  Significant changes when compared with previous on July 20, 2020 [EG]      ED Course User Index  [EG] Elida Jean Baptiste MD         Diagnoses as of 09/07/24 1014   Acute renal failure, unspecified acute renal failure type (CMS-HCC)   Hepatitis   Hypertensive emergency   Acute congestive heart failure, unspecified heart failure type (Multi)   Smoking   Leg swelling   Non-ST elevation (NSTEMI) myocardial infarction (Multi)                 No data recorded     Bia Coma Scale Score: 15 (08/22/24 1100 : Stephanie Holley RN)                           Medical Decision Making    HPI:  As Above  PMHx/PSHx/Meds/Allergies/SH/FH as per nursing documentation and reviewed.  Review of systems: Total of 10 systems reviewed and otherwise negative except as noted elsewhere    DDX: As  described in MDM    If performed, radiology listed above interpreted by me and confirmed by the Radiologist.  Medications administered during this visit (name and route): see MAR  Social determinants of health considered for this visit: lives at home  If performed, EKG interpreted by me and detailed above    [facility] Summary/considerations:  75 yo m presenting with vague complaints at the insistance of his son. Pt denies medical history, but appears to be a poor historian. His workup today is concerning for several emergent conditions. CXR BNP and signs and symptoms of CHF. Ordered lasix. Acute kidney injury. Pt states still making urine and no hyperkalemia. Emergent dialysis not indicated at this time. Elevated liver enzymes with uncertain etiology. Pt will be admitted to the hospitalist service    The patient was seen and triaged by our nursing/medic staff, their vitals were taken and the staff notes were reviewed.  If the patient arrived by an EMS squad or an outside agency, we discussed the case with transporting EMS medic, police, or other historians. My initial assessment was attention to their airway, breathing, and circulatory status.  We addressed any immediate or life threatening findings and completed a medical history and a physical exam if the patient or those legally responsible were in agreement with this.   **Disclaimer:  This note was dictated by speech recognition technology.  Minor errors in transcription may be present.  Please contact for clarification or corrections.      Amount and/or Complexity of Data Reviewed  Labs: ordered. Decision-making details documented in ED Course.  Radiology: ordered and independent interpretation performed. Decision-making details documented in ED Course.  ECG/medicine tests: ordered and independent interpretation performed. Decision-making details documented in ED Course.        Procedure  Critical Care    Performed by: Elida Jean Baptiste MD  Authorized by:  Elida Jean Baptiste MD    Critical care provider statement:     Critical care time (minutes):  31    Critical care time was exclusive of:  Separately billable procedures and treating other patients    Critical care was necessary to treat or prevent imminent or life-threatening deterioration of the following conditions:  Cardiac failure and metabolic crisis    Critical care was time spent personally by me on the following activities:  Development of treatment plan with patient or surrogate, evaluation of patient's response to treatment, examination of patient, obtaining history from patient or surrogate, ordering and performing treatments and interventions, ordering and review of laboratory studies, ordering and review of radiographic studies, pulse oximetry, re-evaluation of patient's condition and review of old charts    Care discussed with: admitting provider         Elida Jean Baptiste MD  08/16/24 1006       Elida Jean Baptiste MD  09/07/24 1014

## 2024-08-11 NOTE — PROGRESS NOTES
"Estuardo El is a 74 y.o. male on day 0 of admission presenting with shortness of breath, hypertensive urgency, acute kidney injury, elevated liver enzymes and    Subjective   He was awake, said he felt okay.  He previously had blood pressures as high as 210/148.  At the time of this encounter, the most recent blood pressure on his monitor was 150/115.     Objective     Physical Exam  General: awake, responsive, not in acute respiratory distress.   Cardiovascular: regular, normal S1 and S2  Lungs: good air entry bilaterally, clear to auscultation  Abdomen: soft, nontender, bowel sounds present, normoactive  Extremities: no peripheral cyanosis, no pedal edema  Neuro: awake, oriented x 3, no focal weakness    Last Recorded Vitals  Blood pressure (!) 199/143, pulse (!) 107, temperature 36.3 °C (97.3 °F), temperature source Temporal, resp. rate (!) 21, height 1.778 m (5' 10\"), SpO2 97%.  Intake/Output last 3 Shifts:  No intake/output data recorded.    Relevant Results  Lab Results   Component Value Date    WBC 11.8 (H) 08/11/2024    HGB 13.2 (L) 08/11/2024    HCT 38.4 (L) 08/11/2024    MCV 97 08/11/2024     (L) 08/11/2024     Lab Results   Component Value Date    GLUCOSE 104 (H) 08/11/2024    CALCIUM 9.3 08/11/2024     08/11/2024    K 3.9 08/11/2024    CO2 16 (L) 08/11/2024    CL 98 08/11/2024    BUN 44 (H) 08/11/2024    CREATININE 4.10 (H) 08/11/2024     Scheduled medications  amLODIPine, 10 mg, oral, Daily  aspirin, 81 mg, oral, Daily  carvedilol, 12.5 mg, oral, BID  hydrALAZINE, 10 mg, oral, TID  nicotine, 1 patch, transdermal, Daily  perflutren lipid microspheres, 0.5-10 mL of dilution, intravenous, Once in imaging  perflutren protein A microsphere, 0.5 mL, intravenous, Once in imaging  sulfur hexafluoride microsphr, 2 mL, intravenous, Once in imaging      Continuous medications     PRN medications  PRN medications: acetaminophen, heparin (porcine), ondansetron, polyethylene glycol      Assessment/Plan "   Acute kidney failure  Metabolic acidosis  Creatinine was 3.9 at admission, increased to 4.1.  It was 1.18 in 7/2020  Renal ultrasound  Fluid intake and output  Nephrology consult    Elevated troponin  First 2 troponin levels were 171 and 161.  Possible causes: Myocardial strain versus NSTEMI versus kidney failure  Repeat troponin level is pending  Cardiology consulted    Hypertensive urgency  On amlodipine and hydralazine.  Blood pressure has improved.  May need additional medication later for adequate blood pressure control.  Will avoid excessive lowering of blood pressure    Elevated proBNP  May be related to elevated BP versus possibleCHF  Elevated at greater than 70,000  Echocardiogram pending    Abnormal results of liver function study  AST and ALT are elevated, alkaline phosphatase is normal.  There is hyperbilirubinemia  Further history of hepatitis C.    Hepatitis panel and right upper quadrant ultrasound are pending  Check direct bilirubin    Tobacco use  Nicotine patch ordered    Leukocytosis  Not, probably stress-induced, no acute infection identified.    Philipp Bowen MD

## 2024-08-11 NOTE — CONSULTS
Consults    Reason For Consult  Elevated LFTs     History Of Present Illness  Estuardo El is a 74 y.o. male presenting with weakness, CHF, NSTEMI. Found evidence elevated LFTs:   Alk Phos 85 Total Bili 1.7. He mentions hx of alcohol abuse many years ago. No current usage. He denies new medications or antibiotics. Hx Hep C. Pending acute hep panel. He denies any abdominal pain, nausea, vomiting. Denies dizziness or syncope. INR is pending     Past Medical History  He has a past medical history of CHF (congestive heart failure) (Multi), Hepatitis C, and Hypertension.    Surgical History  He has a past surgical history that includes Back surgery.     Social History  He reports that he has been smoking cigarettes. He started smoking about 57 years ago. He has a 57.6 pack-year smoking history. He has never used smokeless tobacco. He reports that he does not currently use alcohol. No history on file for drug use.    Family History  Family History   Problem Relation Name Age of Onset    Heart attack Father      Heart attack Brother          Allergies  Patient has no known allergies.    Review of Systems   Constitutional:  Positive for fatigue.   Gastrointestinal:  Negative for abdominal pain, constipation, diarrhea, nausea and vomiting.   Neurological:  Positive for weakness.        Physical Exam  Vitals reviewed.   Constitutional:       General: He is awake.      Appearance: Normal appearance.   HENT:      Head: Normocephalic and atraumatic.      Mouth/Throat:      Mouth: Mucous membranes are moist.   Cardiovascular:      Rate and Rhythm: Normal rate and regular rhythm.   Pulmonary:      Effort: Pulmonary effort is normal.      Breath sounds: Normal breath sounds.   Abdominal:      General: There is no distension.      Palpations: Abdomen is soft.      Tenderness: There is no abdominal tenderness. There is no guarding.   Musculoskeletal:      Cervical back: Normal range of motion and neck supple.   Skin:    "  General: Skin is warm and dry.   Neurological:      General: No focal deficit present.      Mental Status: He is alert and oriented to person, place, and time. Mental status is at baseline.   Psychiatric:         Attention and Perception: Attention and perception normal.         Mood and Affect: Mood normal.         Behavior: Behavior normal.          Last Recorded Vitals  Blood pressure (!) 174/124, pulse 94, temperature 36.3 °C (97.3 °F), temperature source Temporal, resp. rate 14, height 1.778 m (5' 10\"), SpO2 99%.    Relevant Results  Results for orders placed or performed during the hospital encounter of 08/11/24 (from the past 24 hour(s))   CBC and Auto Differential   Result Value Ref Range    WBC 11.6 (H) 4.4 - 11.3 x10*3/uL    nRBC 1.0 (H) 0.0 - 0.0 /100 WBCs    RBC 4.14 (L) 4.50 - 5.90 x10*6/uL    Hemoglobin 13.7 13.5 - 17.5 g/dL    Hematocrit 40.1 (L) 41.0 - 52.0 %    MCV 97 80 - 100 fL    MCH 33.1 26.0 - 34.0 pg    MCHC 34.2 32.0 - 36.0 g/dL    RDW 14.8 (H) 11.5 - 14.5 %    Platelets 144 (L) 150 - 450 x10*3/uL    Immature Granulocytes %, Automated 1.2 (H) 0.0 - 0.9 %    Immature Granulocytes Absolute, Automated 0.14 0.00 - 0.50 x10*3/uL   Comprehensive Metabolic Panel   Result Value Ref Range    Glucose 122 (H) 65 - 99 mg/dL    Sodium 137 133 - 145 mmol/L    Potassium 3.8 3.4 - 5.1 mmol/L    Chloride 98 97 - 107 mmol/L    Bicarbonate 15 (L) 24 - 31 mmol/L    Urea Nitrogen 44 (H) 8 - 25 mg/dL    Creatinine 3.90 (H) 0.40 - 1.60 mg/dL    eGFR 16 (L) >60 mL/min/1.73m*2    Calcium 9.8 8.5 - 10.4 mg/dL    Albumin 4.2 3.5 - 5.0 g/dL    Alkaline Phosphatase 87 35 - 125 U/L    Total Protein 7.6 5.9 - 7.9 g/dL     (H) 5 - 40 U/L    Bilirubin, Total 1.9 (H) 0.1 - 1.2 mg/dL     (H) 5 - 40 U/L    Anion Gap >19 (H) <=19 mmol/L   Magnesium   Result Value Ref Range    Magnesium 2.20 1.60 - 3.10 mg/dL   NT Pro-BNP   Result Value Ref Range    PROBNP >70,000 (H) 0 - 229 pg/mL   D-dimer, quantitative   Result " Value Ref Range    D-Dimer Non VTE, Quant (mg/L FEU) 1.99 (H) 0.19 - 0.50 mg/L FEU   Serial Troponin, Initial ([facility])   Result Value Ref Range    Troponin T, High Sensitivity 171 (HH) <=14 ng/L   Manual Differential   Result Value Ref Range    Neutrophils %, Manual 86.0 40.0 - 80.0 %    Lymphocytes %, Manual 10.0 13.0 - 44.0 %    Monocytes %, Manual 3.0 2.0 - 10.0 %    Eosinophils %, Manual 0.0 0.0 - 6.0 %    Basophils %, Manual 0.0 0.0 - 2.0 %    Metamyelocytes %, Manual 1.0 0.0 - 0.0 %    Seg Neutrophils Absolute, Manual 9.98 (H) 1.60 - 5.00 x10*3/uL    Lymphocytes Absolute, Manual 1.16 0.80 - 3.00 x10*3/uL    Monocytes Absolute, Manual 0.35 0.05 - 0.80 x10*3/uL    Eosinophils Absolute, Manual 0.00 0.00 - 0.40 x10*3/uL    Basophils Absolute, Manual 0.00 0.00 - 0.10 x10*3/uL    Metamyelocytes Absolute, Manual 0.12 0.00 - 0.00 x10*3/uL    Total Cells Counted 100     RBC Morphology See Below     Polychromasia Mild     [place] Cells Few     Acanthocytes Few    aPTT   Result Value Ref Range    aPTT 29.0 22.0 - 32.5 seconds   Hemoglobin A1c   Result Value Ref Range    Hemoglobin A1C 4.8 See below %    Estimated Average Glucose 91 Not Established mg/dL   Serial Troponin, 2 Hour ([facility])   Result Value Ref Range    Troponin T, High Sensitivity 161 (HH) <=14 ng/L   Creatine Kinase   Result Value Ref Range    Creatine Kinase 308 (H) 24 - 195 U/L   CBC   Result Value Ref Range    WBC 11.8 (H) 4.4 - 11.3 x10*3/uL    nRBC 0.8 (H) 0.0 - 0.0 /100 WBCs    RBC 3.96 (L) 4.50 - 5.90 x10*6/uL    Hemoglobin 13.2 (L) 13.5 - 17.5 g/dL    Hematocrit 38.4 (L) 41.0 - 52.0 %    MCV 97 80 - 100 fL    MCH 33.3 26.0 - 34.0 pg    MCHC 34.4 32.0 - 36.0 g/dL    RDW 14.8 (H) 11.5 - 14.5 %    Platelets 136 (L) 150 - 450 x10*3/uL   Comprehensive Metabolic Panel   Result Value Ref Range    Glucose 104 (H) 65 - 99 mg/dL    Sodium 136 133 - 145 mmol/L    Potassium 3.9 3.4 - 5.1 mmol/L    Chloride 98 97 - 107 mmol/L    Bicarbonate 16 (L) 24 - 31 mmol/L     Urea Nitrogen 44 (H) 8 - 25 mg/dL    Creatinine 4.10 (H) 0.40 - 1.60 mg/dL    eGFR 15 (L) >60 mL/min/1.73m*2    Calcium 9.3 8.5 - 10.4 mg/dL    Albumin 4.1 3.5 - 5.0 g/dL    Alkaline Phosphatase 85 35 - 125 U/L    Total Protein 7.1 5.9 - 7.9 g/dL     (H) 5 - 40 U/L    Bilirubin, Total 1.7 (H) 0.1 - 1.2 mg/dL     (H) 5 - 40 U/L    Anion Gap >19 (H) <=19 mmol/L   Lipid Panel   Result Value Ref Range    Cholesterol 130 (L) 133 - 200 mg/dL    HDL-Cholesterol 22.0 (L) >40.0 mg/dL    Cholesterol/HDL Ratio 5.9 SEE COMMENT    LDL Calculated 80 65 - 130 mg/dL    Triglycerides 138 40 - 150 mg/dL   Bilirubin, Direct   Result Value Ref Range    Bilirubin, Direct 0.8 (H) 0.0 - 0.2 mg/dL   Serial Troponin, 6 Hour ([facility])   Result Value Ref Range    Troponin T, High Sensitivity 176 (HH) <=14 ng/L   Urinalysis with Reflex Culture and Microscopic   Result Value Ref Range    Color, Urine Light-Yellow Light-Yellow, Yellow, Dark-Yellow    Appearance, Urine Clear Clear    Specific Gravity, Urine 1.011 1.005 - 1.035    pH, Urine 5.5 5.0, 5.5, 6.0, 6.5, 7.0, 7.5, 8.0    Protein, Urine 100 (2+) (A) NEGATIVE, 10 (TRACE), 20 (TRACE) mg/dL    Glucose, Urine Normal Normal mg/dL    Blood, Urine 0.2 (2+) (A) NEGATIVE    Ketones, Urine NEGATIVE NEGATIVE mg/dL    Bilirubin, Urine NEGATIVE NEGATIVE    Urobilinogen, Urine Normal Normal mg/dL    Nitrite, Urine NEGATIVE NEGATIVE    Leukocyte Esterase, Urine NEGATIVE NEGATIVE   Urinalysis Microscopic   Result Value Ref Range    WBC, Urine NONE 1-5, NONE /HPF    RBC, Urine 1-2 NONE, 1-2, 3-5 /HPF    Squamous Epithelial Cells, Urine 1-9 (SPARSE) Reference range not established. /HPF    Mucus, Urine FEW Reference range not established. /LPF     XR chest 1 view    Result Date: 8/11/2024  Interpreted By:  Yesenia Oliveira, STUDY: XR CHEST 1 VIEW;  8/11/2024 1:50 am   INDICATION: Signs/Symptoms:Chest Pain.   COMPARISON: 07/18/2020   ACCESSION NUMBER(S): [id number]   ORDERING CLINICIAN: NATALIIA  [id number]   FINDINGS:     CARDIOMEDIASTINAL SILHOUETTE: Cardiomediastinal silhouette is normal in size and configuration.   LUNGS: No pulmonary consolidation, pleural effusion or pneumothorax.   ABDOMEN: No remarkable upper abdominal findings.   BONES: No acute osseous abnormality.       No acute cardiopulmonary process.   MACRO: None   Signed by: Yeseina Oliveira 8/11/2024 2:20 AM Dictation workstation:   [id number]        Assessment/Plan     Elevated LFTs, NSTEMI    -Predominately hepatocellular. Unclear etiology. His BP is actually high. No syncope. Less likely shock liver. Denies ETOH. No listed home meds. Denies antibiotics    -Pending acute hep panel    -RUQ US    -INR    -Daily CMP    -Supportive care     I spent 30 minutes in the professional and overall care of this patient.

## 2024-08-11 NOTE — ED NOTES
PT resting. MD at bedside. Pt taking po well. No complaints at this time. Pt cooperative and follows all commands. VS.     Cori Booth RN  08/11/24 0911

## 2024-08-11 NOTE — CONSULTS
Consults    Reason For Consult  Acute kidney injury    History Of Present Illness  Estuardo El is a 74 y.o. male with a past medical history of diastolic congestive heart failure, hepatitis C, hypertension who presents to the hospital with dyspnea, weakness, poor p.o. intake, cough, peripheral edema, found to have acute kidney injury, hypertensive urgency.  Patient also reports he has had some headaches which are improving and reports diarrhea, chest pain, was evaluated by cardiology here.  He is a poor historian but does not think he has seen a nephrologist before.  Does not check his blood pressure at home.  Denies family history of kidney disease.  Denies any illicit drug use or alcohol use.  Says he is a smoker.  Postvoid residual bladder scan showed 90 cc in the ER per the nurse.  Chest x-ray unremarkable.  His baseline creatinine in 2020 was 1.1-1.2, no labs in our system since then until this admission.  He is unsure if he takes NSAIDs at home.  We are consulted for management of acute kidney injury.     Past Medical History  He has a past medical history of CHF (congestive heart failure) (Multi), Hepatitis C, and Hypertension.    Surgical History  He has a past surgical history that includes Back surgery.     Social History  He reports that he has been smoking cigarettes. He started smoking about 57 years ago. He has a 57.6 pack-year smoking history. He has never used smokeless tobacco. He reports that he does not currently use alcohol. No history on file for drug use.    Family History  Family History   Problem Relation Name Age of Onset    Heart attack Father      Heart attack Brother          Allergies  Patient has no known allergies.    Review of Systems   Constitutional:  Positive for appetite change. Negative for chills and fever.   HENT:  Negative for congestion and sinus pain.    Respiratory:  Positive for shortness of breath. Negative for chest tightness.    Cardiovascular:  Positive for chest  "pain and leg swelling.   Gastrointestinal:  Positive for diarrhea. Negative for nausea and vomiting.   Endocrine: Negative for polydipsia, polyphagia and polyuria.   Genitourinary:  Negative for dysuria and hematuria.   Musculoskeletal:  Negative for arthralgias and myalgias.   Skin:  Negative for color change and rash.   Neurological:  Positive for weakness and headaches. Negative for dizziness and syncope.   Psychiatric/Behavioral:  Negative for confusion and hallucinations.           Physical Exam  Constitutional:       General: He is awake. He is not in acute distress.     Appearance: He is not toxic-appearing.   HENT:      Head: Normocephalic and atraumatic.   Eyes:      General: No scleral icterus.     Comments: Conjunctiva clear   Neck:      Vascular: No JVD.      Comments: supple  Cardiovascular:      Rate and Rhythm: Regular rhythm.      Heart sounds:      No friction rub.   Pulmonary:      Effort: Pulmonary effort is normal.      Comments: Mildly diminished breath sounds  Abdominal:      General: Bowel sounds are normal.      Palpations: Abdomen is soft.      Tenderness: There is no guarding or rebound.   Musculoskeletal:      Cervical back: Neck supple.      Right lower leg: No edema.      Left lower leg: No edema.   Skin:     General: Skin is warm and dry.   Neurological:      Mental Status: He is alert.      Comments: Cooperative with exam. Oriented   Psychiatric:      Comments: Slightly depressed mood and affect            Last Recorded Vitals  Blood pressure (!) 174/124, pulse 94, temperature 36.3 °C (97.3 °F), temperature source Temporal, resp. rate 14, height 1.778 m (5' 10\"), SpO2 99%.    Relevant Results  Results for orders placed or performed during the hospital encounter of 08/11/24 (from the past 24 hour(s))   CBC and Auto Differential   Result Value Ref Range    WBC 11.6 (H) 4.4 - 11.3 x10*3/uL    nRBC 1.0 (H) 0.0 - 0.0 /100 WBCs    RBC 4.14 (L) 4.50 - 5.90 x10*6/uL    Hemoglobin 13.7 13.5 - " 17.5 g/dL    Hematocrit 40.1 (L) 41.0 - 52.0 %    MCV 97 80 - 100 fL    MCH 33.1 26.0 - 34.0 pg    MCHC 34.2 32.0 - 36.0 g/dL    RDW 14.8 (H) 11.5 - 14.5 %    Platelets 144 (L) 150 - 450 x10*3/uL    Immature Granulocytes %, Automated 1.2 (H) 0.0 - 0.9 %    Immature Granulocytes Absolute, Automated 0.14 0.00 - 0.50 x10*3/uL   Comprehensive Metabolic Panel   Result Value Ref Range    Glucose 122 (H) 65 - 99 mg/dL    Sodium 137 133 - 145 mmol/L    Potassium 3.8 3.4 - 5.1 mmol/L    Chloride 98 97 - 107 mmol/L    Bicarbonate 15 (L) 24 - 31 mmol/L    Urea Nitrogen 44 (H) 8 - 25 mg/dL    Creatinine 3.90 (H) 0.40 - 1.60 mg/dL    eGFR 16 (L) >60 mL/min/1.73m*2    Calcium 9.8 8.5 - 10.4 mg/dL    Albumin 4.2 3.5 - 5.0 g/dL    Alkaline Phosphatase 87 35 - 125 U/L    Total Protein 7.6 5.9 - 7.9 g/dL     (H) 5 - 40 U/L    Bilirubin, Total 1.9 (H) 0.1 - 1.2 mg/dL     (H) 5 - 40 U/L    Anion Gap >19 (H) <=19 mmol/L   Magnesium   Result Value Ref Range    Magnesium 2.20 1.60 - 3.10 mg/dL   NT Pro-BNP   Result Value Ref Range    PROBNP >70,000 (H) 0 - 229 pg/mL   D-dimer, quantitative   Result Value Ref Range    D-Dimer Non VTE, Quant (mg/L FEU) 1.99 (H) 0.19 - 0.50 mg/L FEU   Serial Troponin, Initial ([facility])   Result Value Ref Range    Troponin T, High Sensitivity 171 (HH) <=14 ng/L   Manual Differential   Result Value Ref Range    Neutrophils %, Manual 86.0 40.0 - 80.0 %    Lymphocytes %, Manual 10.0 13.0 - 44.0 %    Monocytes %, Manual 3.0 2.0 - 10.0 %    Eosinophils %, Manual 0.0 0.0 - 6.0 %    Basophils %, Manual 0.0 0.0 - 2.0 %    Metamyelocytes %, Manual 1.0 0.0 - 0.0 %    Seg Neutrophils Absolute, Manual 9.98 (H) 1.60 - 5.00 x10*3/uL    Lymphocytes Absolute, Manual 1.16 0.80 - 3.00 x10*3/uL    Monocytes Absolute, Manual 0.35 0.05 - 0.80 x10*3/uL    Eosinophils Absolute, Manual 0.00 0.00 - 0.40 x10*3/uL    Basophils Absolute, Manual 0.00 0.00 - 0.10 x10*3/uL    Metamyelocytes Absolute, Manual 0.12 0.00 - 0.00  x10*3/uL    Total Cells Counted 100     RBC Morphology See Below     Polychromasia Mild     [place] Cells Few     Acanthocytes Few    aPTT   Result Value Ref Range    aPTT 29.0 22.0 - 32.5 seconds   Hemoglobin A1c   Result Value Ref Range    Hemoglobin A1C 4.8 See below %    Estimated Average Glucose 91 Not Established mg/dL   Serial Troponin, 2 Hour ([facility])   Result Value Ref Range    Troponin T, High Sensitivity 161 (HH) <=14 ng/L   Creatine Kinase   Result Value Ref Range    Creatine Kinase 308 (H) 24 - 195 U/L   CBC   Result Value Ref Range    WBC 11.8 (H) 4.4 - 11.3 x10*3/uL    nRBC 0.8 (H) 0.0 - 0.0 /100 WBCs    RBC 3.96 (L) 4.50 - 5.90 x10*6/uL    Hemoglobin 13.2 (L) 13.5 - 17.5 g/dL    Hematocrit 38.4 (L) 41.0 - 52.0 %    MCV 97 80 - 100 fL    MCH 33.3 26.0 - 34.0 pg    MCHC 34.4 32.0 - 36.0 g/dL    RDW 14.8 (H) 11.5 - 14.5 %    Platelets 136 (L) 150 - 450 x10*3/uL   Comprehensive Metabolic Panel   Result Value Ref Range    Glucose 104 (H) 65 - 99 mg/dL    Sodium 136 133 - 145 mmol/L    Potassium 3.9 3.4 - 5.1 mmol/L    Chloride 98 97 - 107 mmol/L    Bicarbonate 16 (L) 24 - 31 mmol/L    Urea Nitrogen 44 (H) 8 - 25 mg/dL    Creatinine 4.10 (H) 0.40 - 1.60 mg/dL    eGFR 15 (L) >60 mL/min/1.73m*2    Calcium 9.3 8.5 - 10.4 mg/dL    Albumin 4.1 3.5 - 5.0 g/dL    Alkaline Phosphatase 85 35 - 125 U/L    Total Protein 7.1 5.9 - 7.9 g/dL     (H) 5 - 40 U/L    Bilirubin, Total 1.7 (H) 0.1 - 1.2 mg/dL     (H) 5 - 40 U/L    Anion Gap >19 (H) <=19 mmol/L   Lipid Panel   Result Value Ref Range    Cholesterol 130 (L) 133 - 200 mg/dL    HDL-Cholesterol 22.0 (L) >40.0 mg/dL    Cholesterol/HDL Ratio 5.9 SEE COMMENT    LDL Calculated 80 65 - 130 mg/dL    Triglycerides 138 40 - 150 mg/dL   Bilirubin, Direct   Result Value Ref Range    Bilirubin, Direct 0.8 (H) 0.0 - 0.2 mg/dL   Urinalysis with Reflex Culture and Microscopic   Result Value Ref Range    Color, Urine Light-Yellow Light-Yellow, Yellow, Dark-Yellow     Appearance, Urine Clear Clear    Specific Gravity, Urine 1.011 1.005 - 1.035    pH, Urine 5.5 5.0, 5.5, 6.0, 6.5, 7.0, 7.5, 8.0    Protein, Urine 100 (2+) (A) NEGATIVE, 10 (TRACE), 20 (TRACE) mg/dL    Glucose, Urine Normal Normal mg/dL    Blood, Urine 0.2 (2+) (A) NEGATIVE    Ketones, Urine NEGATIVE NEGATIVE mg/dL    Bilirubin, Urine NEGATIVE NEGATIVE    Urobilinogen, Urine Normal Normal mg/dL    Nitrite, Urine NEGATIVE NEGATIVE    Leukocyte Esterase, Urine NEGATIVE NEGATIVE   Urinalysis Microscopic   Result Value Ref Range    WBC, Urine NONE 1-5, NONE /HPF    RBC, Urine 1-2 NONE, 1-2, 3-5 /HPF    Squamous Epithelial Cells, Urine 1-9 (SPARSE) Reference range not established. /HPF    Mucus, Urine FEW Reference range not established. /LPF          Assessment/Plan   Acute kidney injury, the differential diagnosis is broad given his complex medical history of diastolic congestive heart failure, hepatitis C, BPH, uncontrolled hypertension, will workup  Hypertensive urgency  Diastolic congestive heart failure  Hepatitis C  BPH  Metabolic acidosis  Elevated liver enzymes    Plan: Pending urinalysis, renal ultrasound, echocardiogram, hepatitis panel.  Patient was started on oral antihypertensive agents, his blood pressure is now in the 170s systolic.  Because he has been having headaches at home and has hypertensive urgency I am getting a stat head CT.  Monitor blood pressure closely.  Avoid rapid drop in blood pressure, his goal blood pressure during the day today should be around 160 systolic.  I would avoid any further diuretics or IV fluids at this time. Check renin, aldosterone, metanephrines. Start oral bicarbonate.  Cardiology and GI on board.  Monitor for any dialysis needs.  Unable to plan for a kidney biopsy as he already received aspirin.  If his renal function does not improve or there are any indications for kidney biopsy will have to discuss with cardiology if the aspirin can be stopped.  Thank you for your  consultation.    René Peralta MD

## 2024-08-11 NOTE — H&P
History Of Present Illness  Estuardo El is a 74 y.o. male who denies past medical history who presents to the emergency room with shortness of breath.  On chart review it appears that patient previously was diagnosed with hypertension and was on multiple medications for this, he also had BPH, hepatitis C, diastolic heart failure listed as problems.  Patient tells me he does not remember any of these diagnoses and does not remember being on pills previously.    Patient reports he has been feeling increasingly short of breath for the last few weeks.  Says that he has a hard time getting up and moving because he is so short of breath.  His son is bedside and says he is also very weak.  Patient says he started soiling himself because he cannot get up to the bathroom and not eating because he cannot make it to the kitchen.  The son notes that he does not have an appetite even when he brings food over for him.  Patient reports chest pressure that is mostly positional if he is laying flat in bed or on his side.  He denies any current chest pressure.  He reports a cough.  Denies any abdominal pain.  He also reports swollen legs over the last few weeks.     Past Medical History  He has a past medical history of CHF (congestive heart failure) (Multi), Hepatitis C, and Hypertension.    Surgical History  He has a past surgical history that includes Back surgery.     Social History  He reports that he has been smoking cigarettes. He started smoking about 57 years ago. He has a 57.6 pack-year smoking history. He has never used smokeless tobacco. He reports that he does not currently use alcohol. No history on file for drug use.    Family History  Family History   Problem Relation Name Age of Onset    Heart attack Father      Heart attack Brother          Allergies  Patient has no known allergies.    Review of Systems  General: + Fatigue, no malaise, no fevers/chills   HENT: no rhinorrhea, no sore throat, no ear pain   Eyes: no  change in vision, denies eye pain or discharge   Lungs: + SOB, + cough, no hemoptysis   CV: no chest pain, no palpitations, + leg edema   Abd: no nausea/vomiting, no constipation/diarrhea, no abdominal pain   : no dysuria, no frequency, no nocturia, no flank pain   Endocrine: no polydipsia/polyuria, no hot or cold intolerance   Neuro: no headaches, no syncope, no seizures   MSK: no back pain, no neck pain, no joint problems   Psych: no anxiety, no depression, no hallucinations       Physical Exam  General: alert, no diaphoresis   HENT: mucous membranes moist, external ears normal, no rhinorrhea   Eyes: no icterus or injection, no discharge   Lungs: Faint bibasilar rales   Heart: RRR, nonpitting LE edema BL   GI: abdomen soft, nontender, nondistended, BS present   MSK: no joint effusion or deformity   Skin: no rashes, erythema, or ecchymosis   Neuro: grossly normal cognition, motor strength, sensation     Last Recorded Vitals  BP (!) 202/162 (BP Location: Right arm, Patient Position: Lying) Comment: Dr Grady notified of hypertension  Pulse (!) 116   Temp 36.3 °C (97.3 °F) (Temporal)   Resp 17   SpO2 100%     Relevant Results        Results for orders placed or performed during the hospital encounter of 08/11/24 (from the past 24 hour(s))   CBC and Auto Differential   Result Value Ref Range    WBC 11.6 (H) 4.4 - 11.3 x10*3/uL    nRBC 1.0 (H) 0.0 - 0.0 /100 WBCs    RBC 4.14 (L) 4.50 - 5.90 x10*6/uL    Hemoglobin 13.7 13.5 - 17.5 g/dL    Hematocrit 40.1 (L) 41.0 - 52.0 %    MCV 97 80 - 100 fL    MCH 33.1 26.0 - 34.0 pg    MCHC 34.2 32.0 - 36.0 g/dL    RDW 14.8 (H) 11.5 - 14.5 %    Platelets 144 (L) 150 - 450 x10*3/uL    Immature Granulocytes %, Automated 1.2 (H) 0.0 - 0.9 %    Immature Granulocytes Absolute, Automated 0.14 0.00 - 0.50 x10*3/uL   Comprehensive Metabolic Panel   Result Value Ref Range    Glucose 122 (H) 65 - 99 mg/dL    Sodium 137 133 - 145 mmol/L    Potassium 3.8 3.4 - 5.1 mmol/L    Chloride 98  97 - 107 mmol/L    Bicarbonate 15 (L) 24 - 31 mmol/L    Urea Nitrogen 44 (H) 8 - 25 mg/dL    Creatinine 3.90 (H) 0.40 - 1.60 mg/dL    eGFR 16 (L) >60 mL/min/1.73m*2    Calcium 9.8 8.5 - 10.4 mg/dL    Albumin 4.2 3.5 - 5.0 g/dL    Alkaline Phosphatase 87 35 - 125 U/L    Total Protein 7.6 5.9 - 7.9 g/dL     (H) 5 - 40 U/L    Bilirubin, Total 1.9 (H) 0.1 - 1.2 mg/dL     (H) 5 - 40 U/L    Anion Gap >19 (H) <=19 mmol/L   Magnesium   Result Value Ref Range    Magnesium 2.20 1.60 - 3.10 mg/dL   NT Pro-BNP   Result Value Ref Range    PROBNP >70,000 (H) 0 - 229 pg/mL   D-dimer, quantitative   Result Value Ref Range    D-Dimer Non VTE, Quant (mg/L FEU) 1.99 (H) 0.19 - 0.50 mg/L FEU   Serial Troponin, Initial ([facility])   Result Value Ref Range    Troponin T, High Sensitivity 171 (HH) <=14 ng/L   Manual Differential   Result Value Ref Range    Neutrophils %, Manual 86.0 40.0 - 80.0 %    Lymphocytes %, Manual 10.0 13.0 - 44.0 %    Monocytes %, Manual 3.0 2.0 - 10.0 %    Eosinophils %, Manual 0.0 0.0 - 6.0 %    Basophils %, Manual 0.0 0.0 - 2.0 %    Metamyelocytes %, Manual 1.0 0.0 - 0.0 %    Seg Neutrophils Absolute, Manual 9.98 (H) 1.60 - 5.00 x10*3/uL    Lymphocytes Absolute, Manual 1.16 0.80 - 3.00 x10*3/uL    Monocytes Absolute, Manual 0.35 0.05 - 0.80 x10*3/uL    Eosinophils Absolute, Manual 0.00 0.00 - 0.40 x10*3/uL    Basophils Absolute, Manual 0.00 0.00 - 0.10 x10*3/uL    Metamyelocytes Absolute, Manual 0.12 0.00 - 0.00 x10*3/uL    Total Cells Counted 100     RBC Morphology See Below     Polychromasia Mild     [place] Cells Few     Acanthocytes Few    Serial Troponin, 2 Hour ([facility])   Result Value Ref Range    Troponin T, High Sensitivity 161 (HH) <=14 ng/L          Assessment/Plan   Principal Problem:    Acute renal failure, unspecified acute renal failure type (CMS-HCC)    SUZANNE  -Significant SUZANNE with creatinine 3.9.  Potassium is stable although patient is fairly acidotic. He has history of SUZANNE after  rhabdomyolysis and a fall, which seems less likely now but will get CK regardless  -Patient reports he has been urinating without problem and having incontinence.  -Check renal ultrasound, urinalysis, CK.  I am a little worried because he has a history of BPH and has not been taking medication for this.  He could be obstructed.  Will have nurse get bladder scan if he is unable to urinate shortly  -Consult Dr. Peralta    NSTEMI  -Again questionable given his very elevated creatinine this could be more related to his renal failure.  That said he has some EKG changes with T wave inversions in the inferior and lateral leads.  First 2 troponins were 171 then 161.  Perhaps the third troponin will let us know if it is downtrending or more flat  -Heparin drip initiated  -Baby aspirin.  Check lipid panel  -Consult cardiology    Acute on chronic diastolic heart failure.  Based off last echo in 2020 he had a normal EF.  This could certainly have changed but we will know until the echocardiogram comes back  -Patient has faint rales on exam and some nonpitting edema.  He also has hypertensive urgency.  His proBNP is greater than 70,000 although he is also in profound renal failure.  Still his clinical picture and symptoms mostly fits with fluid overload.  -Echocardiogram ordered  -Consult cardiology  -Getting a dose of Lasix now.  I am going to hold off on redosing until we make sure that the patient is able to urinate  -He is on room air    Hypertensive urgency  -Start Norvasc now.  He is also getting a dose of Lasix.  He was previously on a hydrochlorothiazide, amlodipine, lisinopril.  So I suspect he will need multiple agents to get better control of his blood pressure.  That said, there is no benefit in rapidly lowering his blood pressure.  We can add another agent later today so as not to cause abrupt drop in blood pressure and put him at risk for a stroke    Abnormal liver function  -Patient reports no history of liver  problems however I see in his chart that he previously was diagnosed with hepatitis C  -Also I suspect some of his liver issues are congestion from fluid overload.  We will get a right upper quadrant ultrasound and consult GI    Weakness  - pt/ot    Cigarette smoker  - nicotine patch ordered  - willl continue to  importance of cessation during his stay    CODE STATUS: Full code- discussed with patient and son.      CCT: 45 min       Mary Batres DO

## 2024-08-12 ENCOUNTER — APPOINTMENT (OUTPATIENT)
Dept: CARDIOLOGY | Facility: HOSPITAL | Age: 74
DRG: 682 | End: 2024-08-12
Payer: MEDICARE

## 2024-08-12 LAB
ALBUMIN SERPL-MCNC: 3.7 G/DL (ref 3.5–5)
ALP BLD-CCNC: 79 U/L (ref 35–125)
ALT SERPL-CCNC: 442 U/L (ref 5–40)
ANION GAP SERPL CALC-SCNC: >19 MMOL/L
AORTIC VALVE MEAN GRADIENT: 3.1 MMHG
AORTIC VALVE PEAK VELOCITY: 1.19 M/S
APAP SERPL-MCNC: <5 UG/ML
AST SERPL-CCNC: 480 U/L (ref 5–40)
ATRIAL RATE: 105 BPM
ATRIAL RATE: 110 BPM
AV PEAK GRADIENT: 5.7 MMHG
AVA (PEAK VEL): 2.75 CM2
AVA (VTI): 2.38 CM2
BILIRUB SERPL-MCNC: 1.4 MG/DL (ref 0.1–1.2)
BUN SERPL-MCNC: 49 MG/DL (ref 8–25)
CALCIUM SERPL-MCNC: 8.8 MG/DL (ref 8.5–10.4)
CHLORIDE SERPL-SCNC: 100 MMOL/L (ref 97–107)
CO2 SERPL-SCNC: 19 MMOL/L (ref 24–31)
CREAT SERPL-MCNC: 3.9 MG/DL (ref 0.4–1.6)
EGFRCR SERPLBLD CKD-EPI 2021: 16 ML/MIN/1.73M*2
EJECTION FRACTION APICAL 4 CHAMBER: 40
EJECTION FRACTION: 43 %
ERYTHROCYTE [DISTWIDTH] IN BLOOD BY AUTOMATED COUNT: 14.6 % (ref 11.5–14.5)
GLOBAL LONGITUDINAL STRAIN: -9.3 %
GLUCOSE SERPL-MCNC: 82 MG/DL (ref 65–99)
HCT VFR BLD AUTO: 37.8 % (ref 41–52)
HGB BLD-MCNC: 13 G/DL (ref 13.5–17.5)
LEFT ATRIUM VOLUME AREA LENGTH INDEX BSA: 32.6 ML/M2
LEFT VENTRICLE INTERNAL DIMENSION DIASTOLE: 4.98 CM (ref 3.5–6)
LEFT VENTRICULAR OUTFLOW TRACT DIAMETER: 2.03 CM
LV EJECTION FRACTION BIPLANE: 37 %
MCH RBC QN AUTO: 33.1 PG (ref 26–34)
MCHC RBC AUTO-ENTMCNC: 34.4 G/DL (ref 32–36)
MCV RBC AUTO: 96 FL (ref 80–100)
MITRAL VALVE E/A RATIO: 0.52
MITRAL VALVE E/E' RATIO: 10.57
NRBC BLD-RTO: 0.9 /100 WBCS (ref 0–0)
P AXIS: 71 DEGREES
P AXIS: 83 DEGREES
P OFFSET: 187 MS
P OFFSET: 192 MS
P ONSET: 146 MS
P ONSET: 150 MS
PHOSPHATE SERPL-MCNC: 4.6 MG/DL (ref 2.5–4.5)
PLATELET # BLD AUTO: 129 X10*3/UL (ref 150–450)
POTASSIUM SERPL-SCNC: 3.3 MMOL/L (ref 3.4–5.1)
PR INTERVAL: 142 MS
PR INTERVAL: 150 MS
PROT SERPL-MCNC: 6.5 G/DL (ref 5.9–7.9)
Q ONSET: 217 MS
Q ONSET: 225 MS
QRS COUNT: 17 BEATS
QRS COUNT: 18 BEATS
QRS DURATION: 70 MS
QRS DURATION: 86 MS
QT INTERVAL: 320 MS
QT INTERVAL: 356 MS
QTC CALCULATION(BAZETT): 433 MS
QTC CALCULATION(BAZETT): 470 MS
QTC FREDERICIA: 392 MS
QTC FREDERICIA: 428 MS
R AXIS: 15 DEGREES
R AXIS: 38 DEGREES
RBC # BLD AUTO: 3.93 X10*6/UL (ref 4.5–5.9)
RIGHT VENTRICLE FREE WALL PEAK S': 11.17 CM/S
RIGHT VENTRICLE PEAK SYSTOLIC PRESSURE: 27.8 MMHG
SODIUM SERPL-SCNC: 139 MMOL/L (ref 133–145)
T AXIS: -65 DEGREES
T AXIS: 248 DEGREES
T OFFSET: 385 MS
T OFFSET: 395 MS
TRICUSPID ANNULAR PLANE SYSTOLIC EXCURSION: 2.1 CM
VENTRICULAR RATE: 105 BPM
VENTRICULAR RATE: 110 BPM
WBC # BLD AUTO: 9.5 X10*3/UL (ref 4.4–11.3)

## 2024-08-12 PROCEDURE — 82105 ALPHA-FETOPROTEIN SERUM: CPT | Mod: WESLAB

## 2024-08-12 PROCEDURE — 80143 DRUG ASSAY ACETAMINOPHEN: CPT | Performed by: NURSE PRACTITIONER

## 2024-08-12 PROCEDURE — 99232 SBSQ HOSP IP/OBS MODERATE 35: CPT | Performed by: NURSE PRACTITIONER

## 2024-08-12 PROCEDURE — 80320 DRUG SCREEN QUANTALCOHOLS: CPT | Performed by: INTERNAL MEDICINE

## 2024-08-12 PROCEDURE — 97161 PT EVAL LOW COMPLEX 20 MIN: CPT | Mod: GP

## 2024-08-12 PROCEDURE — 36415 COLL VENOUS BLD VENIPUNCTURE: CPT | Performed by: HOSPITALIST

## 2024-08-12 PROCEDURE — 2500000002 HC RX 250 W HCPCS SELF ADMINISTERED DRUGS (ALT 637 FOR MEDICARE OP, ALT 636 FOR OP/ED): Performed by: INTERNAL MEDICINE

## 2024-08-12 PROCEDURE — 2500000001 HC RX 250 WO HCPCS SELF ADMINISTERED DRUGS (ALT 637 FOR MEDICARE OP): Performed by: INTERNAL MEDICINE

## 2024-08-12 PROCEDURE — 93306 TTE W/DOPPLER COMPLETE: CPT

## 2024-08-12 PROCEDURE — 84100 ASSAY OF PHOSPHORUS: CPT | Performed by: INTERNAL MEDICINE

## 2024-08-12 PROCEDURE — 36415 COLL VENOUS BLD VENIPUNCTURE: CPT | Performed by: INTERNAL MEDICINE

## 2024-08-12 PROCEDURE — 2500000004 HC RX 250 GENERAL PHARMACY W/ HCPCS (ALT 636 FOR OP/ED): Performed by: HOSPITALIST

## 2024-08-12 PROCEDURE — 86160 COMPLEMENT ANTIGEN: CPT | Mod: WESLAB | Performed by: INTERNAL MEDICINE

## 2024-08-12 PROCEDURE — 97166 OT EVAL MOD COMPLEX 45 MIN: CPT | Mod: GO

## 2024-08-12 PROCEDURE — 93306 TTE W/DOPPLER COMPLETE: CPT | Performed by: INTERNAL MEDICINE

## 2024-08-12 PROCEDURE — 85027 COMPLETE CBC AUTOMATED: CPT | Performed by: HOSPITALIST

## 2024-08-12 PROCEDURE — 2500000004 HC RX 250 GENERAL PHARMACY W/ HCPCS (ALT 636 FOR OP/ED): Performed by: INTERNAL MEDICINE

## 2024-08-12 PROCEDURE — S4991 NICOTINE PATCH NONLEGEND: HCPCS | Performed by: HOSPITALIST

## 2024-08-12 PROCEDURE — 86036 ANCA SCREEN EACH ANTIBODY: CPT | Performed by: INTERNAL MEDICINE

## 2024-08-12 PROCEDURE — 99232 SBSQ HOSP IP/OBS MODERATE 35: CPT | Performed by: STUDENT IN AN ORGANIZED HEALTH CARE EDUCATION/TRAINING PROGRAM

## 2024-08-12 PROCEDURE — 2500000002 HC RX 250 W HCPCS SELF ADMINISTERED DRUGS (ALT 637 FOR MEDICARE OP, ALT 636 FOR OP/ED): Performed by: HOSPITALIST

## 2024-08-12 PROCEDURE — 82693 ASSAY OF ETHYLENE GLYCOL: CPT | Performed by: INTERNAL MEDICINE

## 2024-08-12 PROCEDURE — 2500000001 HC RX 250 WO HCPCS SELF ADMINISTERED DRUGS (ALT 637 FOR MEDICARE OP): Performed by: HOSPITALIST

## 2024-08-12 PROCEDURE — 84075 ASSAY ALKALINE PHOSPHATASE: CPT | Performed by: HOSPITALIST

## 2024-08-12 PROCEDURE — 2060000001 HC INTERMEDIATE ICU ROOM DAILY

## 2024-08-12 RX ORDER — HYDRALAZINE HYDROCHLORIDE 50 MG/1
50 TABLET, FILM COATED ORAL 3 TIMES DAILY
Status: DISCONTINUED | OUTPATIENT
Start: 2024-08-12 | End: 2024-08-22 | Stop reason: HOSPADM

## 2024-08-12 RX ORDER — POTASSIUM CHLORIDE 20 MEQ/1
20 TABLET, EXTENDED RELEASE ORAL ONCE
Status: COMPLETED | OUTPATIENT
Start: 2024-08-12 | End: 2024-08-12

## 2024-08-12 RX ADMIN — AMLODIPINE BESYLATE 10 MG: 10 TABLET ORAL at 08:45

## 2024-08-12 RX ADMIN — POTASSIUM CHLORIDE 20 MEQ: 1500 TABLET, EXTENDED RELEASE ORAL at 11:01

## 2024-08-12 RX ADMIN — HYDRALAZINE HYDROCHLORIDE 50 MG: 50 TABLET ORAL at 15:07

## 2024-08-12 RX ADMIN — CARVEDILOL 12.5 MG: 12.5 TABLET, FILM COATED ORAL at 05:45

## 2024-08-12 RX ADMIN — HYDRALAZINE HYDROCHLORIDE 50 MG: 50 TABLET ORAL at 20:19

## 2024-08-12 RX ADMIN — HYDRALAZINE HYDROCHLORIDE 25 MG: 25 TABLET ORAL at 08:45

## 2024-08-12 RX ADMIN — SODIUM BICARBONATE 650 MG: 650 TABLET ORAL at 08:45

## 2024-08-12 RX ADMIN — SODIUM BICARBONATE 650 MG: 650 TABLET ORAL at 20:19

## 2024-08-12 RX ADMIN — Medication 1 PATCH: at 08:45

## 2024-08-12 RX ADMIN — ACETAMINOPHEN 650 MG: 325 TABLET ORAL at 20:19

## 2024-08-12 RX ADMIN — CARVEDILOL 12.5 MG: 12.5 TABLET, FILM COATED ORAL at 16:48

## 2024-08-12 RX ADMIN — PERFLUTREN 10 ML OF DILUTION: 6.52 INJECTION, SUSPENSION INTRAVENOUS at 10:37

## 2024-08-12 SDOH — SOCIAL STABILITY: SOCIAL INSECURITY: WITHIN THE LAST YEAR, HAVE YOU BEEN AFRAID OF YOUR PARTNER OR EX-PARTNER?: NO

## 2024-08-12 SDOH — SOCIAL STABILITY: SOCIAL INSECURITY
WITHIN THE LAST YEAR, HAVE YOU BEEN KICKED, HIT, SLAPPED, OR OTHERWISE PHYSICALLY HURT BY YOUR PARTNER OR EX-PARTNER?: NO

## 2024-08-12 SDOH — SOCIAL STABILITY: SOCIAL INSECURITY: HAS ANYONE EVER THREATENED TO HURT YOUR FAMILY OR YOUR PETS?: NO

## 2024-08-12 SDOH — HEALTH STABILITY: PHYSICAL HEALTH
HOW OFTEN DO YOU NEED TO HAVE SOMEONE HELP YOU WHEN YOU READ INSTRUCTIONS, PAMPHLETS, OR OTHER WRITTEN MATERIAL FROM YOUR DOCTOR OR PHARMACY?: RARELY

## 2024-08-12 SDOH — ECONOMIC STABILITY: TRANSPORTATION INSECURITY: IN THE PAST 12 MONTHS, HAS LACK OF TRANSPORTATION KEPT YOU FROM MEDICAL APPOINTMENTS OR FROM GETTING MEDICATIONS?: NO

## 2024-08-12 SDOH — ECONOMIC STABILITY: FOOD INSECURITY: HOW HARD IS IT FOR YOU TO PAY FOR THE VERY BASICS LIKE FOOD, HOUSING, MEDICAL CARE, AND HEATING?: SOMEWHAT HARD

## 2024-08-12 SDOH — SOCIAL STABILITY: SOCIAL INSECURITY: WITHIN THE LAST YEAR, HAVE YOU BEEN HUMILIATED OR EMOTIONALLY ABUSED IN OTHER WAYS BY YOUR PARTNER OR EX-PARTNER?: NO

## 2024-08-12 SDOH — ECONOMIC STABILITY: HOUSING INSECURITY: AT ANY TIME IN THE PAST 12 MONTHS, WERE YOU HOMELESS OR LIVING IN A SHELTER (INCLUDING NOW)?: NO

## 2024-08-12 SDOH — SOCIAL STABILITY: SOCIAL NETWORK: HOW OFTEN DO YOU ATTEND MEETINGS OF THE CLUBS OR ORGANIZATIONS YOU BELONG TO?: NEVER

## 2024-08-12 SDOH — HEALTH STABILITY: MENTAL HEALTH: HOW OFTEN DO YOU HAVE SIX OR MORE DRINKS ON ONE OCCASION?: WEEKLY

## 2024-08-12 SDOH — SOCIAL STABILITY: SOCIAL INSECURITY: DO YOU FEEL UNSAFE GOING BACK TO THE PLACE WHERE YOU ARE LIVING?: NO

## 2024-08-12 SDOH — ECONOMIC STABILITY: INCOME INSECURITY: HOW HARD IS IT FOR YOU TO PAY FOR THE VERY BASICS LIKE FOOD, HOUSING, MEDICAL CARE, AND HEATING?: SOMEWHAT HARD

## 2024-08-12 SDOH — ECONOMIC STABILITY: FOOD INSECURITY: WITHIN THE PAST 12 MONTHS, THE FOOD YOU BOUGHT JUST DIDN'T LAST AND YOU DIDN'T HAVE MONEY TO GET MORE.: SOMETIMES TRUE

## 2024-08-12 SDOH — SOCIAL STABILITY: SOCIAL NETWORK: HOW OFTEN DO YOU ATTENT MEETINGS OF THE CLUB OR ORGANIZATION YOU BELONG TO?: NEVER

## 2024-08-12 SDOH — HEALTH STABILITY: PHYSICAL HEALTH: ON AVERAGE, HOW MANY DAYS PER WEEK DO YOU ENGAGE IN MODERATE TO STRENUOUS EXERCISE (LIKE A BRISK WALK)?: 0 DAYS

## 2024-08-12 SDOH — HEALTH STABILITY: MENTAL HEALTH: HOW OFTEN DO YOU HAVE A DRINK CONTAINING ALCOHOL?: 4 OR MORE TIMES A WEEK

## 2024-08-12 SDOH — ECONOMIC STABILITY: HOUSING INSECURITY: IN THE PAST 12 MONTHS, HOW MANY TIMES HAVE YOU MOVED WHERE YOU WERE LIVING?: 0

## 2024-08-12 SDOH — SOCIAL STABILITY: SOCIAL NETWORK: IN A TYPICAL WEEK, HOW MANY TIMES DO YOU TALK ON THE PHONE WITH FAMILY, FRIENDS, OR NEIGHBORS?: TWICE A WEEK

## 2024-08-12 SDOH — SOCIAL STABILITY: SOCIAL NETWORK: HOW OFTEN DO YOU ATTEND CHURCH OR RELIGIOUS SERVICES?: NEVER

## 2024-08-12 SDOH — ECONOMIC STABILITY: INCOME INSECURITY: IN THE PAST 12 MONTHS, HAS THE ELECTRIC, GAS, OIL, OR WATER COMPANY THREATENED TO SHUT OFF SERVICE IN YOUR HOME?: NO

## 2024-08-12 SDOH — HEALTH STABILITY: MENTAL HEALTH: HOW MANY DRINKS CONTAINING ALCOHOL DO YOU HAVE ON A TYPICAL DAY WHEN YOU ARE DRINKING?: 7 TO 9

## 2024-08-12 SDOH — ECONOMIC STABILITY: FOOD INSECURITY
WITHIN THE PAST 12 MONTHS, YOU WORRIED THAT YOUR FOOD WOULD RUN OUT BEFORE YOU GOT THE MONEY TO BUY MORE.: SOMETIMES TRUE

## 2024-08-12 SDOH — SOCIAL STABILITY: SOCIAL INSECURITY
WITHIN THE LAST YEAR, HAVE YOU BEEN RAPED OR FORCED TO HAVE ANY KIND OF SEXUAL ACTIVITY BY YOUR PARTNER OR EX-PARTNER?: NO

## 2024-08-12 SDOH — HEALTH STABILITY: MENTAL HEALTH
STRESS IS WHEN SOMEONE FEELS TENSE, NERVOUS, ANXIOUS, OR CAN'T SLEEP AT NIGHT BECAUSE THEIR MIND IS TROUBLED. HOW STRESSED ARE YOU?: TO SOME EXTENT

## 2024-08-12 SDOH — HEALTH STABILITY: MENTAL HEALTH
DO YOU FEEL STRESS - TENSE, RESTLESS, NERVOUS, OR ANXIOUS, OR UNABLE TO SLEEP AT NIGHT BECAUSE YOUR MIND IS TROUBLED ALL THE TIME - THESE DAYS?: TO SOME EXTENT

## 2024-08-12 SDOH — SOCIAL STABILITY: SOCIAL NETWORK
DO YOU BELONG TO ANY CLUBS OR ORGANIZATIONS SUCH AS CHURCH GROUPS, UNIONS, FRATERNAL OR ATHLETIC GROUPS, OR SCHOOL GROUPS?: NO

## 2024-08-12 SDOH — ECONOMIC STABILITY: FOOD INSECURITY: WITHIN THE PAST 12 MONTHS, YOU WORRIED THAT YOUR FOOD WOULD RUN OUT BEFORE YOU GOT MONEY TO BUY MORE.: SOMETIMES TRUE

## 2024-08-12 SDOH — ECONOMIC STABILITY: HOUSING INSECURITY: IN THE LAST 12 MONTHS, WAS THERE A TIME WHEN YOU WERE NOT ABLE TO PAY THE MORTGAGE OR RENT ON TIME?: NO

## 2024-08-12 SDOH — HEALTH STABILITY: PHYSICAL HEALTH: ON AVERAGE, HOW MANY MINUTES DO YOU ENGAGE IN EXERCISE AT THIS LEVEL?: 0 MIN

## 2024-08-12 SDOH — ECONOMIC STABILITY: INCOME INSECURITY: IN THE PAST 12 MONTHS HAS THE ELECTRIC, GAS, OIL, OR WATER COMPANY THREATENED TO SHUT OFF SERVICES IN YOUR HOME?: NO

## 2024-08-12 SDOH — HEALTH STABILITY: MENTAL HEALTH: HOW MANY STANDARD DRINKS CONTAINING ALCOHOL DO YOU HAVE ON A TYPICAL DAY?: 7 TO 9

## 2024-08-12 SDOH — SOCIAL STABILITY: SOCIAL NETWORK: HOW OFTEN DO YOU GET TOGETHER WITH FRIENDS OR RELATIVES?: ONCE A WEEK

## 2024-08-12 SDOH — ECONOMIC STABILITY: INCOME INSECURITY: IN THE LAST 12 MONTHS, WAS THERE A TIME WHEN YOU WERE NOT ABLE TO PAY THE MORTGAGE OR RENT ON TIME?: NO

## 2024-08-12 SDOH — SOCIAL STABILITY: SOCIAL INSECURITY
WITHIN THE LAST YEAR, HAVE TO BEEN RAPED OR FORCED TO HAVE ANY KIND OF SEXUAL ACTIVITY BY YOUR PARTNER OR EX-PARTNER?: NO

## 2024-08-12 SDOH — SOCIAL STABILITY: SOCIAL INSECURITY: ARE THERE ANY APPARENT SIGNS OF INJURIES/BEHAVIORS THAT COULD BE RELATED TO ABUSE/NEGLECT?: NO

## 2024-08-12 SDOH — SOCIAL STABILITY: SOCIAL NETWORK
DO YOU BELONG TO ANY CLUBS OR ORGANIZATIONS SUCH AS CHURCH GROUPS UNIONS, FRATERNAL OR ATHLETIC GROUPS, OR SCHOOL GROUPS?: NO

## 2024-08-12 SDOH — SOCIAL STABILITY: SOCIAL INSECURITY: DOES ANYONE TRY TO KEEP YOU FROM HAVING/CONTACTING OTHER FRIENDS OR DOING THINGS OUTSIDE YOUR HOME?: NO

## 2024-08-12 SDOH — HEALTH STABILITY: MENTAL HEALTH: HOW OFTEN DO YOU HAVE 6 OR MORE DRINKS ON ONE OCCASION?: WEEKLY

## 2024-08-12 SDOH — SOCIAL STABILITY: SOCIAL INSECURITY: ARE YOU OR HAVE YOU BEEN THREATENED OR ABUSED PHYSICALLY, EMOTIONALLY, OR SEXUALLY BY ANYONE?: NO

## 2024-08-12 SDOH — ECONOMIC STABILITY: TRANSPORTATION INSECURITY
IN THE PAST 12 MONTHS, HAS THE LACK OF TRANSPORTATION KEPT YOU FROM MEDICAL APPOINTMENTS OR FROM GETTING MEDICATIONS?: NO

## 2024-08-12 SDOH — SOCIAL STABILITY: SOCIAL INSECURITY: HAVE YOU HAD ANY THOUGHTS OF HARMING ANYONE ELSE?: NO

## 2024-08-12 SDOH — SOCIAL STABILITY: SOCIAL INSECURITY: DO YOU FEEL ANYONE HAS EXPLOITED OR TAKEN ADVANTAGE OF YOU FINANCIALLY OR OF YOUR PERSONAL PROPERTY?: NO

## 2024-08-12 SDOH — SOCIAL STABILITY: SOCIAL INSECURITY: WERE YOU ABLE TO COMPLETE ALL THE BEHAVIORAL HEALTH SCREENINGS?: YES

## 2024-08-12 SDOH — ECONOMIC STABILITY: TRANSPORTATION INSECURITY
IN THE PAST 12 MONTHS, HAS LACK OF TRANSPORTATION KEPT YOU FROM MEETINGS, WORK, OR FROM GETTING THINGS NEEDED FOR DAILY LIVING?: NO

## 2024-08-12 SDOH — SOCIAL STABILITY: SOCIAL INSECURITY: HAVE YOU HAD THOUGHTS OF HARMING ANYONE ELSE?: NO

## 2024-08-12 SDOH — SOCIAL STABILITY: SOCIAL INSECURITY: ABUSE: ADULT

## 2024-08-12 ASSESSMENT — ACTIVITIES OF DAILY LIVING (ADL)
LACK_OF_TRANSPORTATION: NO
PATIENT'S MEMORY ADEQUATE TO SAFELY COMPLETE DAILY ACTIVITIES?: NO
GROOMING: NEEDS ASSISTANCE
LACK_OF_TRANSPORTATION: NO
BATHING: NEEDS ASSISTANCE
LACK_OF_TRANSPORTATION: NO
ASSISTIVE_DEVICE: EYEGLASSES;WALKER
BATHING_ASSISTANCE: MODERATE
JUDGMENT_ADEQUATE_SAFELY_COMPLETE_DAILY_ACTIVITIES: NO
DRESSING YOURSELF: NEEDS ASSISTANCE
HEARING - RIGHT EAR: FUNCTIONAL
FEEDING YOURSELF: NEEDS ASSISTANCE
TOILETING: NEEDS ASSISTANCE
HEARING - LEFT EAR: FUNCTIONAL
ADEQUATE_TO_COMPLETE_ADL: YES
ADL_ASSISTANCE: INDEPENDENT
WALKS IN HOME: NEEDS ASSISTANCE

## 2024-08-12 ASSESSMENT — COGNITIVE AND FUNCTIONAL STATUS - GENERAL
CLIMB 3 TO 5 STEPS WITH RAILING: TOTAL
DRESSING REGULAR UPPER BODY CLOTHING: A LOT
WALKING IN HOSPITAL ROOM: A LOT
WALKING IN HOSPITAL ROOM: A LOT
DRESSING REGULAR LOWER BODY CLOTHING: A LOT
MOBILITY SCORE: 12
MOVING TO AND FROM BED TO CHAIR: A LOT
MOVING FROM LYING ON BACK TO SITTING ON SIDE OF FLAT BED WITH BEDRAILS: A LOT
PERSONAL GROOMING: A LOT
WALKING IN HOSPITAL ROOM: A LOT
WALKING IN HOSPITAL ROOM: A LOT
HELP NEEDED FOR BATHING: A LOT
STANDING UP FROM CHAIR USING ARMS: A LOT
DRESSING REGULAR UPPER BODY CLOTHING: A LOT
MOVING FROM LYING ON BACK TO SITTING ON SIDE OF FLAT BED WITH BEDRAILS: A LITTLE
MOVING FROM LYING ON BACK TO SITTING ON SIDE OF FLAT BED WITH BEDRAILS: A LITTLE
MOBILITY SCORE: 12
TURNING FROM BACK TO SIDE WHILE IN FLAT BAD: A LOT
DRESSING REGULAR LOWER BODY CLOTHING: A LOT
MOVING TO AND FROM BED TO CHAIR: A LOT
STANDING UP FROM CHAIR USING ARMS: A LOT
STANDING UP FROM CHAIR USING ARMS: A LOT
PATIENT BASELINE BEDBOUND: NO
TOILETING: A LOT
HELP NEEDED FOR BATHING: A LOT
CLIMB 3 TO 5 STEPS WITH RAILING: A LOT
TURNING FROM BACK TO SIDE WHILE IN FLAT BAD: A LOT
MOVING TO AND FROM BED TO CHAIR: A LOT
CLIMB 3 TO 5 STEPS WITH RAILING: A LOT
TURNING FROM BACK TO SIDE WHILE IN FLAT BAD: A LOT
DRESSING REGULAR LOWER BODY CLOTHING: A LOT
HELP NEEDED FOR BATHING: A LOT
MOVING TO AND FROM BED TO CHAIR: A LOT
MOBILITY SCORE: 12
DAILY ACTIVITIY SCORE: 12
DRESSING REGULAR UPPER BODY CLOTHING: A LOT
DAILY ACTIVITIY SCORE: 14
HELP NEEDED FOR BATHING: A LOT
DAILY ACTIVITIY SCORE: 12
CLIMB 3 TO 5 STEPS WITH RAILING: TOTAL
STANDING UP FROM CHAIR USING ARMS: A LOT
MOVING FROM LYING ON BACK TO SITTING ON SIDE OF FLAT BED WITH BEDRAILS: A LOT
TOILETING: A LOT
EATING MEALS: A LOT
TURNING FROM BACK TO SIDE WHILE IN FLAT BAD: A LOT
DRESSING REGULAR UPPER BODY CLOTHING: A LOT
MOBILITY SCORE: 12
PERSONAL GROOMING: A LOT
DRESSING REGULAR LOWER BODY CLOTHING: A LOT
EATING MEALS: A LOT
TOILETING: A LOT
EATING MEALS: A LITTLE
TOILETING: A LOT
PERSONAL GROOMING: A LOT
PERSONAL GROOMING: A LITTLE

## 2024-08-12 ASSESSMENT — PATIENT HEALTH QUESTIONNAIRE - PHQ9
1. LITTLE INTEREST OR PLEASURE IN DOING THINGS: NOT AT ALL
2. FEELING DOWN, DEPRESSED OR HOPELESS: NOT AT ALL
SUM OF ALL RESPONSES TO PHQ9 QUESTIONS 1 & 2: 0

## 2024-08-12 ASSESSMENT — LIFESTYLE VARIABLES
HOW OFTEN DO YOU HAVE 6 OR MORE DRINKS ON ONE OCCASION: NEVER
AUDIT-C TOTAL SCORE: 10
HOW OFTEN DO YOU HAVE A DRINK CONTAINING ALCOHOL: NEVER
SKIP TO QUESTIONS 9-10: 1
SKIP TO QUESTIONS 9-10: 0
HOW MANY STANDARD DRINKS CONTAINING ALCOHOL DO YOU HAVE ON A TYPICAL DAY: PATIENT DOES NOT DRINK
AUDIT-C TOTAL SCORE: 0
AUDIT-C TOTAL SCORE: 0

## 2024-08-12 ASSESSMENT — PAIN - FUNCTIONAL ASSESSMENT
PAIN_FUNCTIONAL_ASSESSMENT: 0-10

## 2024-08-12 ASSESSMENT — PAIN SCALES - GENERAL
PAINLEVEL_OUTOF10: 0 - NO PAIN

## 2024-08-12 NOTE — PROGRESS NOTES
"Estuardo El is a 74 y.o. male on day 1 of admission presenting with Acute renal failure, unspecified acute renal failure type (CMS-HCC).    Subjective   Patient denies abdominal pain, nausea, vomiting. He was taking extra strength tylenol at home        Objective     Physical Exam  Vitals reviewed.   Constitutional:       General: He is awake.      Appearance: Normal appearance.   HENT:      Head: Normocephalic and atraumatic.      Mouth/Throat:      Mouth: Mucous membranes are moist.   Cardiovascular:      Rate and Rhythm: Normal rate and regular rhythm.   Pulmonary:      Effort: Pulmonary effort is normal.      Breath sounds: Normal breath sounds.   Abdominal:      General: There is no distension.      Palpations: Abdomen is soft.      Tenderness: There is no abdominal tenderness. There is no guarding.   Musculoskeletal:      Cervical back: Normal range of motion and neck supple.   Skin:     General: Skin is warm and dry.   Neurological:      General: No focal deficit present.      Mental Status: He is alert and oriented to person, place, and time. Mental status is at baseline.   Psychiatric:         Attention and Perception: Attention and perception normal.         Mood and Affect: Mood normal.         Behavior: Behavior normal.         Last Recorded Vitals  Blood pressure (!) 138/118, pulse 76, temperature 36.4 °C (97.5 °F), temperature source Temporal, resp. rate 18, height 1.778 m (5' 10\"), weight 92.5 kg (203 lb 14.8 oz), SpO2 95%.  Intake/Output last 3 Shifts:  No intake/output data recorded.    Relevant Results                Results for orders placed or performed during the hospital encounter of 08/11/24 (from the past 24 hour(s))   Serial Troponin, 6 Hour ([facility])   Result Value Ref Range    Troponin T, High Sensitivity 176 (HH) <=14 ng/L   Urinalysis with Reflex Culture and Microscopic   Result Value Ref Range    Color, Urine Light-Yellow Light-Yellow, Yellow, Dark-Yellow    Appearance, Urine Clear Clear "    Specific Gravity, Urine 1.011 1.005 - 1.035    pH, Urine 5.5 5.0, 5.5, 6.0, 6.5, 7.0, 7.5, 8.0    Protein, Urine 100 (2+) (A) NEGATIVE, 10 (TRACE), 20 (TRACE) mg/dL    Glucose, Urine Normal Normal mg/dL    Blood, Urine 0.2 (2+) (A) NEGATIVE    Ketones, Urine NEGATIVE NEGATIVE mg/dL    Bilirubin, Urine NEGATIVE NEGATIVE    Urobilinogen, Urine Normal Normal mg/dL    Nitrite, Urine NEGATIVE NEGATIVE    Leukocyte Esterase, Urine NEGATIVE NEGATIVE   Extra Urine Gray Tube   Result Value Ref Range    Extra Tube Hold for add-ons.    Urinalysis Microscopic   Result Value Ref Range    WBC, Urine NONE 1-5, NONE /HPF    RBC, Urine 1-2 NONE, 1-2, 3-5 /HPF    Squamous Epithelial Cells, Urine 1-9 (SPARSE) Reference range not established. /HPF    Mucus, Urine FEW Reference range not established. /LPF   Comprehensive Metabolic Panel   Result Value Ref Range    Glucose 82 65 - 99 mg/dL    Sodium 139 133 - 145 mmol/L    Potassium 3.3 (L) 3.4 - 5.1 mmol/L    Chloride 100 97 - 107 mmol/L    Bicarbonate 19 (L) 24 - 31 mmol/L    Urea Nitrogen 49 (H) 8 - 25 mg/dL    Creatinine 3.90 (H) 0.40 - 1.60 mg/dL    eGFR 16 (L) >60 mL/min/1.73m*2    Calcium 8.8 8.5 - 10.4 mg/dL    Albumin 3.7 3.5 - 5.0 g/dL    Alkaline Phosphatase 79 35 - 125 U/L    Total Protein 6.5 5.9 - 7.9 g/dL     (H) 5 - 40 U/L    Bilirubin, Total 1.4 (H) 0.1 - 1.2 mg/dL     (H) 5 - 40 U/L    Anion Gap >19 (H) <=19 mmol/L   CBC   Result Value Ref Range    WBC 9.5 4.4 - 11.3 x10*3/uL    nRBC 0.9 (H) 0.0 - 0.0 /100 WBCs    RBC 3.93 (L) 4.50 - 5.90 x10*6/uL    Hemoglobin 13.0 (L) 13.5 - 17.5 g/dL    Hematocrit 37.8 (L) 41.0 - 52.0 %    MCV 96 80 - 100 fL    MCH 33.1 26.0 - 34.0 pg    MCHC 34.4 32.0 - 36.0 g/dL    RDW 14.6 (H) 11.5 - 14.5 %    Platelets 129 (L) 150 - 450 x10*3/uL     CT head wo IV contrast    Result Date: 8/11/2024  Interpreted By:  Uyen Aburto, STUDY: CT HEAD WO IV CONTRAST;  8/11/2024 1:26 pm   INDICATION: Signs/Symptoms:headaches,  hypertensive urgency.   COMPARISON: 07/18/2020   ACCESSION NUMBER(S): [id number]   ORDERING CLINICIAN: GERBER ALTMAN   TECHNIQUE: Unenhanced CT images of the head were obtained.   FINDINGS: The ventricles, cisterns and sulci are prominent, consistent with diffuse volume loss. There are areas of nonspecific white matter hypodensity, which are probably age related or microvascular in nature. These findings are similar to the previous exam. There is no acute intracranial hemorrhage, mass effect or midline shift. No extraaxial fluid collection.   No focal calvarial lesion.   Visualized paranasal sinuses are clear.       No acute intracranial hemorrhage or mass-effect.   MACRO: None.   Signed by: Uyen Aburto 8/11/2024 1:36 PM Dictation workstation:   [id number]    US renal complete    Result Date: 8/11/2024  Interpreted By:  Paul Lerma, STUDY: US RENAL COMPLETE; 8/11/2024 12:52 pm   INDICATION: Signs/Symptoms:SUZANNE.   COMPARISON: None.   ACCESSION NUMBER(S): [id number]   ORDERING CLINICIAN: FLORENTINO VALENTINE   FINDINGS: The right kidney measures 10.5 cm. There is normal cortical thickness. No hydronephrosis or nephrolithiasis demonstrated. Of note, there is a 1.2 cm cyst in the midpole of the right kidney with posterior acoustic enhancement. Also, in the inferior pole there is a 1.4 cm cyst.   The left kidney measures 10.7 cm. There are a few adjacent cysts within the inferior pole of the left kidney largest measuring 3.8 cm. Also, there is a adjacent septated cyst in the inferior pole measuring 2.7 cm with posterior acoustic enhancement. No asymmetric thickening septa.   Bladder demonstrates bilateral ureteral jets. No intraluminal filling defect visualized portions of the bladder.           1. Cysts left kidney with a complex cyst in the inferior pole of the left kidney demonstrating a thin internal septation. No nodularity or asymmetric thickened septa   2. No hydronephrosis   MACRO: None.   Signed by: Paul  Florentin 8/11/2024 12:57 PM Dictation workstation:   [id number]    US right upper quadrant    Result Date: 8/11/2024  Interpreted By:  Schoenberger, Joseph, STUDY: US RIGHT UPPER QUADRANT;  8/11/2024 12:46 pm   INDICATION: Signs/Symptoms:abnormal LFT's.   COMPARISON: None.   ACCESSION NUMBER(S): [id number]   ORDERING CLINICIAN: FLORENTINO VALENTINE   TECHNIQUE: Multiple images of the right upper quadrant were obtained.   FINDINGS: LIVER: The liver measures 15.5 cm  and is diffusely echogenic in appearance, consistent with diffuse fatty infiltration. The resulting increased beam attenuation thereby limiting evaluation of the liver for focal lesions. Within the limitations, no focal lesions are seen.     GALLBLADDER: The gallbladder is nondistended, and demonstrates no evidence of gallstones, wall thickening or surrounding fluid. The gallbladder wall thickness is 4.1 mm. Sonographic Whipple's sign is negative.     BILE DUCTS: No evidence of intra or extrahepatic biliary dilatation is identified; the common bile duct measures 1.8 mm .   PANCREAS: The pancreas is poorly visualized due to overlying bowel gas.   RIGHT KIDNEY: The right kidney measures 9.9 cm  in length. The renal cortical thickness is is within normal limit.  No hydronephrosis or renal calculi are seen. The renal cortex is somewhat hyperechoic.       Hepatic steatosis/steatohepatitis.   Chronic medical renal disease.   MACRO: None   Signed by: Joseph Schoenberger 8/11/2024 12:50 PM Dictation workstation:   [id number]    Vascular US lower extremity venous duplex bilateral    Result Date: 8/11/2024  Interpreted By:  Schoenberger, Joseph, STUDY: VASC US LOWER EXTREMITY VENOUS DUPLEX BILATERAL;  8/11/2024 12:46 pm   INDICATION: Signs/Symptoms:Swelling and elevated dimer.   COMPARISON: None.   ACCESSION NUMBER(S): [id number]   ORDERING CLINICIAN: FLORENTINO VALENTINE   TECHNIQUE: Vascular ultrasound of the bilateral lower extremities was performed. Real-time  compression views as well as Gray scale, color Doppler and spectral Doppler waveform analysis was performed.   FINDINGS: Evaluation of the visualized portions of the bilateral common femoral vein, proximal, mid, and distal femoral vein, and popliteal vein were performed.  Evaluation of the visualized portions of the  posterior tibial and peroneal veins were also performed.   Limitations:  None   The evaluated veins demonstrate normal compressibility. There is intact venous flow demonstrating normal respiratory variability and normal augmentation of flow with calf compression. Therefore, there is no ultrasonographic evidence for deep vein thrombosis within the evaluated veins.   Respiratory variation and augmentation to calf pressure was noted.       No sonographic findings suggestive of right or left lower extremity deep venous thrombosis.   MACRO: None   Signed by: Joseph Schoenberger 8/11/2024 12:48 PM Dictation workstation:   [id number]    XR chest 1 view    Result Date: 8/11/2024  Interpreted By:  Yesenia Oliveira, STUDY: XR CHEST 1 VIEW;  8/11/2024 1:50 am   INDICATION: Signs/Symptoms:Chest Pain.   COMPARISON: 07/18/2020   ACCESSION NUMBER(S): [id number]   ORDERING CLINICIAN: NATALIIA ODONNELL   FINDINGS:     CARDIOMEDIASTINAL SILHOUETTE: Cardiomediastinal silhouette is normal in size and configuration.   LUNGS: No pulmonary consolidation, pleural effusion or pneumothorax.   ABDOMEN: No remarkable upper abdominal findings.   BONES: No acute osseous abnormality.       No acute cardiopulmonary process.   MACRO: None   Signed by: Yesenia Oliveira 8/11/2024 2:20 AM Dictation workstation:   [id number]                Assessment/Plan   Principal Problem:    Acute renal failure, unspecified acute renal failure type (CMS-HCC)    Elevated LFTs, NSTEMI               -Predominately hepatocellular. His BP is actually high. No syncope. Less likely shock liver. Less likely congestive. Denies current ETOH but he does have  a prior history for many years. No listed home meds. Denies antibiotics Possible elevated CK/rhabdo contributing vs Tylenol use (he reports taking a lot of extra strength at home)   -Acetaminophen added to earliest labs, pending               -Hep C + antibody. He was treated, pending RNA by PCR               -RUQ US fatty liver. Normal gallbladder               -INR pending               -Daily CMP               -Supportive care        I spent 20 minutes in the professional and overall care of this patient.      Adela Whitehead, APRN-CNP

## 2024-08-12 NOTE — PROGRESS NOTES
Physical Therapy    Physical Therapy Evaluation & Treatment    Patient Name: Estuardo El  MRN: [id number]  Today's Date: 8/12/2024   Time Calculation  Start Time: 0847  Stop Time: 0909  Time Calculation (min): 22 min    Assessment/Plan   PT Assessment  PT Assessment Results: Decreased strength, Decreased endurance, Impaired balance, Decreased mobility, Decreased safety awareness  Rehab Prognosis: Good  Evaluation/Treatment Tolerance: Patient limited by fatigue  End of Session Communication: Bedside nurse  Assessment Comment: 74 year old male presents with decline from baseline functional mobility, fall risk, impaired balance, decreased tolerance to activity, and decreased safety awareness;  patient would benefit from skilled physical therapy services to safely maximize functional mobility to modified independent levels.  End of Session Patient Position: Up in chair, Alarm on   IP OR SWING BED PT PLAN  Inpatient or Swing Bed: Inpatient  PT Plan  Treatment/Interventions: Bed mobility, Transfer training, Gait training, Balance training, Strengthening, Endurance training, Therapeutic exercise, Therapeutic activity  PT Plan: Ongoing PT  PT Frequency: 4 times per week  PT Discharge Recommendations: Moderate intensity level of continued care  PT Recommended Transfer Status: Assist x2  PT - OK to Discharge: Yes (with skilled physical therapy services at next level of care.)      Subjective     General Visit Information:  General  Reason for Referral: Impaired mobility with acute renal failure  Past Medical History Relevant to Rehab: remote ETOH, hep C, CHF, HTN, back surgery  Co-Treatment: OT  Co-Treatment Reason: Co-eval due to limited tolerance to activity  Prior to Session Communication: Bedside nurse  Patient Position Received: Bed, 3 rail up, Alarm off, not on at start of session  General Comment: 74 year old male admit from home with weakness, fatigue, SOB, cough, chest pain, and poor oral intake.  Home Living:  Home  Living  Type of Home: Apartment  Lives With: Alone  Home Adaptive Equipment: Walker rolling or standard  Home Living Comments: Unable to accurately assess home environment due to memory deficits  Prior Level of Function:  Prior Function Per Pt/Caregiver Report  Ambulatory Assistance: Independent (mod independent with rollator)  Precautions:  Precautions  Medical Precautions: Fall precautions         Objective   Pain:  Pain Assessment  Pain Assessment: 0-10  0-10 (Numeric) Pain Score: 0 - No pain  Cognition:  Cognition  Overall Cognitive Status: Impaired (Oriented to person and time;  unable to state name of hospital or reason for hospital admission;  memory deficits)    General Assessments:                  Activity Tolerance  Endurance: Decreased tolerance for upright activites  Activity Tolerance Comments: Fatigue    Sensation  Sensation Comment: Denies numbness huy LE       Coordination  Movements are Fluid and Coordinated: No  Lower Body Coordination: Huy LE movements are slow with decreased accuracy of movement    Static Sitting Balance  Static Sitting-Balance Support: Bilateral upper extremity supported  Static Sitting-Level of Assistance: Minimum assistance  Static Sitting-Comment/Number of Minutes: Intermittent assist required to maintain midline while sitting on side of bed due to decreased balance posterior    Static Standing Balance  Static Standing-Balance Support: Bilateral upper extremity supported  Static Standing-Level of Assistance: Moderate assistance (x 2)  Static Standing-Comment/Number of Minutes: Assist with trunk support and balance during static standing with rolling walker  Functional Assessments:  Bed Mobility  Bed Mobility: Yes  Bed Mobility 1  Bed Mobility 1: Supine to sitting  Level of Assistance 1: Moderate assistance (x 2)  Bed Mobility Comments 1: Assist with trunk-up and huy LE during supine to sit;  mod assist to scoot hips to edge of bed during supine to  sit.    Transfers  Transfer: Yes  Transfer 1  Transfer From 1: Sit to  Transfer to 1: Stand  Technique 1: Sit to stand  Transfer Device 1: Walker  Transfer Level of Assistance 1: Arm in arm assistance, Moderate assistance (x 2)  Trials/Comments 1: Assist with elevating buttocks from sitting surface and positioning COG over EDMUND during sit to stand;  verbal cues for hand placement  Transfers 2  Transfer From 2: Stand to  Transfer to 2: Sit  Technique 2: Stand to sit  Transfer Device 2: Walker  Transfer Level of Assistance 2: Arm in arm assistance, Moderate assistance (x 2)  Trials/Comments 2: Assist with trunk support and balance;  verbal cues for hand placement;  decreased eccentric control  Transfers 3  Transfer From 3: Bed to  Transfer to 3: Chair with arms  Technique 3: Stand pivot  Transfer Device 3: Walker  Transfer Level of Assistance 3: Arm in arm assistance, Moderate assistance (x 2)  Trials/Comments 3: Assist with trunk support, balance, and walker positioning;  patient appeared to support body weight with tom LE in standing;  patient required increased time and effort to weightshift and advance tom LE during pivot portion of transfer.    Ambulation/Gait Training  Ambulation/Gait Training Performed: No    Stairs  Stairs: No  Extremity/Trunk Assessments:  RLE   RLE : Exceptions to WFL  Strength RLE  R Hip Flexion: 2+/5  R Knee Extension: 3-/5  R Ankle Dorsiflexion: 3-/5  LLE   LLE : Exceptions to WFL  Strength LLE  L Hip Flexion: 2+/5  L Knee Extension: 3-/5  L Ankle Dorsiflexion: 3-/5  Treatments:                 Bed Mobility  Bed Mobility: Yes  Bed Mobility 1  Bed Mobility 1: Supine to sitting  Level of Assistance 1: Moderate assistance (x 2)  Bed Mobility Comments 1: Assist with trunk-up and tom LE during supine to sit;  mod assist to scoot hips to edge of bed during supine to sit.    Ambulation/Gait Training  Ambulation/Gait Training Performed: No  Transfers  Transfer: Yes  Transfer 1  Transfer From 1:  Sit to  Transfer to 1: Stand  Technique 1: Sit to stand  Transfer Device 1: Walker  Transfer Level of Assistance 1: Arm in arm assistance, Moderate assistance (x 2)  Trials/Comments 1: Assist with elevating buttocks from sitting surface and positioning COG over EDMUND during sit to stand;  verbal cues for hand placement  Transfers 2  Transfer From 2: Stand to  Transfer to 2: Sit  Technique 2: Stand to sit  Transfer Device 2: Walker  Transfer Level of Assistance 2: Arm in arm assistance, Moderate assistance (x 2)  Trials/Comments 2: Assist with trunk support and balance;  verbal cues for hand placement;  decreased eccentric control  Transfers 3  Transfer From 3: Bed to  Transfer to 3: Chair with arms  Technique 3: Stand pivot  Transfer Device 3: Walker  Transfer Level of Assistance 3: Arm in arm assistance, Moderate assistance (x 2)  Trials/Comments 3: Assist with trunk support, balance, and walker positioning;  patient appeared to support body weight with tom LE in standing;  patient required increased time and effort to weightshift and advance tom LE during pivot portion of transfer.    Stairs  Stairs: No       Outcome Measures:  [facility] Basic Mobility  Turning from your back to your side while in a flat bed without using bedrails: A lot  Moving from lying on your back to sitting on the side of a flat bed without using bedrails: A lot  Moving to and from bed to chair (including a wheelchair): A lot  Standing up from a chair using your arms (e.g. wheelchair or bedside chair): A lot  To walk in hospital room: A lot  Climbing 3-5 steps with railing: A lot  Basic Mobility - Total Score: 12    Encounter Problems       Encounter Problems (Active)       Mobility       Bed mobility including supine to sit and sit to supine with min assist. (Progressing)       Start:  08/12/24    Expected End:  08/26/24            Transfers including sit to stand and stand to sit with min assist. (Progressing)       Start:  08/12/24    Expected  End:  08/26/24            Ambulate 50 feet with rolling walker and min assist.       Start:  08/12/24    Expected End:  08/26/24                   Education Documentation  Mobility Training, taught by Loc Mar PT at 8/12/2024  9:29 AM.  Learner: Patient  Readiness: Acceptance  Method: Demonstration, Explanation  Response: Needs Reinforcement    Education Comments  No comments found.

## 2024-08-12 NOTE — PROGRESS NOTES
"Estuardo El is a 74 y.o. male on day 2 of admission presenting with Acute renal failure, unspecified acute renal failure type (CMS-HCC).    Subjective   Alert and oriented, denies complaints of chest pain.  Patient states he remains fatigued but does feel improved.  He no longer feels like he is dying.       Objective     Physical Exam  Vitals and nursing note reviewed.   Constitutional:       General: He is not in acute distress.     Appearance: He is ill-appearing. He is not toxic-appearing.   HENT:      Head: Normocephalic and atraumatic.      Nose: Nose normal.      Mouth/Throat:      Mouth: Mucous membranes are moist.      Pharynx: Oropharynx is clear.   Cardiovascular:      Rate and Rhythm: Normal rate and regular rhythm.      Pulses: Normal pulses.      Heart sounds: Normal heart sounds.   Pulmonary:      Effort: Pulmonary effort is normal.      Breath sounds: Normal breath sounds.   Abdominal:      General: Bowel sounds are normal.      Palpations: Abdomen is soft.   Musculoskeletal:         General: Normal range of motion.      Right lower leg: Edema present.      Left lower leg: Edema present.   Skin:     General: Skin is warm and dry.      Capillary Refill: Capillary refill takes less than 2 seconds.   Neurological:      Mental Status: He is alert. He is disoriented.   Psychiatric:         Mood and Affect: Mood normal.         Behavior: Behavior normal.         Thought Content: Thought content normal.         Judgment: Judgment normal.         Last Recorded Vitals  Blood pressure (!) 138/118, pulse 76, temperature 36.4 °C (97.5 °F), temperature source Temporal, resp. rate 18, height 1.778 m (5' 10\"), weight 92.1 kg (203 lb), SpO2 95%.  Intake/Output last 3 Shifts:  No intake/output data recorded.    Relevant Results  Results for orders placed or performed during the hospital encounter of 08/11/24 (from the past 24 hour(s))   Comprehensive Metabolic Panel   Result Value Ref Range    Glucose 82 65 - 99 " mg/dL    Sodium 139 133 - 145 mmol/L    Potassium 3.3 (L) 3.4 - 5.1 mmol/L    Chloride 100 97 - 107 mmol/L    Bicarbonate 19 (L) 24 - 31 mmol/L    Urea Nitrogen 49 (H) 8 - 25 mg/dL    Creatinine 3.90 (H) 0.40 - 1.60 mg/dL    eGFR 16 (L) >60 mL/min/1.73m*2    Calcium 8.8 8.5 - 10.4 mg/dL    Albumin 3.7 3.5 - 5.0 g/dL    Alkaline Phosphatase 79 35 - 125 U/L    Total Protein 6.5 5.9 - 7.9 g/dL     (H) 5 - 40 U/L    Bilirubin, Total 1.4 (H) 0.1 - 1.2 mg/dL     (H) 5 - 40 U/L    Anion Gap >19 (H) <=19 mmol/L   CBC   Result Value Ref Range    WBC 9.5 4.4 - 11.3 x10*3/uL    nRBC 0.9 (H) 0.0 - 0.0 /100 WBCs    RBC 3.93 (L) 4.50 - 5.90 x10*6/uL    Hemoglobin 13.0 (L) 13.5 - 17.5 g/dL    Hematocrit 37.8 (L) 41.0 - 52.0 %    MCV 96 80 - 100 fL    MCH 33.1 26.0 - 34.0 pg    MCHC 34.4 32.0 - 36.0 g/dL    RDW 14.6 (H) 11.5 - 14.5 %    Platelets 129 (L) 150 - 450 x10*3/uL   Acetaminophen   Result Value Ref Range    Acetaminophen <5.0 15.0 - 30.0 ug/mL   Phosphorus   Result Value Ref Range    Phosphorus 4.6 (H) 2.5 - 4.5 mg/dL         Assessment/Plan   Principal Problem:    Acute renal failure, unspecified acute renal failure type (CMS-HCC)    Hypertensive emergency  Acute kidney injury  Elevated high sensitive troponin  Generalized weakness  Tobacco use  Chronic diastolic heart failure     Overall impression:     8/11: Consulted for congestive heart failure and NSTEMI.  The patient's symptoms are generalized weakness and fatigue.  Reports no chest pain and some mild shortness of breath/fatigue over the past few weeks.  Chest x-ray is clear.  There is no evidence of fluid volume overload on auscultation.  There is no JVD.  There is no peripheral edema.  Despite this his proBNP is 70,000.  Additionally he has noted to have an acute kidney injury with a creatinine of 4.1 which is new for this patient.  Upon further review the patient has severely hypertensive with a systolic averaging over 200.  There is a history of  hypertension, however the patient does not take medications in the outpatient setting.  It appears the patient is having a hypertensive emergency resulting in elevated proBNP and elevated high-sensitivity troponin; and perhaps hypertensive cardiomyopathy.  Certainly there is also suspicion given the elevated blood pressure of renal artery stenosis.  Will check echocardiogram.  Renal artery stenosis study has been ordered.  At this point given lack of chest pain symptoms we will stop heparin drip.  Agree with amlodipine as first agent.  Would avoid ACEs and ARB's.  Agree with carvedilol.  I have added hydralazine for further blood pressure management.  Patient is significantly decompensated from a cardiac perspective given his hypertensive emergency, and a high risk for further decompensation.  Will follow with you.     8/12: Improved over the past 24 hours.  Patient states he feels significantly improved less anxiety and no longer has a feeling that he is dying.  On telemetry monitor remained in a sinus rhythm with occasional PVCs.  His blood pressure significantly improved with most recent of 138/118.  Creatinine is slowly improving with most recent at 3.9.  Hemoglobin stable at 13.0 he has mild peripheral edema this morning, however his lungs are clear on auscultation.  He is breathing constantly on room air.  He is chest pain-free.  He will go for an echocardiogram this morning which will further define his cardiac status.  At this point he continues on a new combination of amlodipine, carvedilol, and hydralazine.  At this point we continue to avoid ACE or ARB's secondary to acute kidney injury.  Nephrology is following.  They note the patient may require a renal biopsy.  His aspirin has been placed on hold for this.  Overall is improving.  Will follow with you.        I spent 35 minutes in the professional and overall care of this patient.      Juan J Gaines, APRN-CNP

## 2024-08-12 NOTE — NURSING NOTE
RN gave patient clean urinal. Patient unable to use at this time. Was incont. RN will obtain sample when can

## 2024-08-12 NOTE — CARE PLAN
Problem: Mobility  Goal: Bed mobility including supine to sit and sit to supine with min assist.  Outcome: Progressing  Goal: Transfers including sit to stand and stand to sit with min assist.  Outcome: Progressing      Subjective   Patient ID: Lisa is a 16 year old female.    Chief Complaint   Patient presents with   • Office Visit   • School Physical   • Sports Physical     Patient here for a school and sports physical for volleyball.      Patient presents for sports physical for volleyball.   Patient is accompanied by mother.   Denies shortness of breath with or without exertion, chest pain, heart murmur, lightheadedness, syncope, or seizures.  Denies family history of sudden cardiac death <50 or early unexplained death.  Denies h/o concussion or head or neck injury.  Denies fractures or other sports related injuries.  Denies use of supplements to enhance health or performance.  Patient consumes regular diet including meats and vegetables.    Patient denies history of Covid infection.        History reviewed. No pertinent past medical history.    MEDICATIONS:  No current outpatient medications on file.     No current facility-administered medications for this visit.       ALLERGIES:  ALLERGIES:  No Known Allergies    PAST SURGICAL HISTORY:  History reviewed. No pertinent surgical history.    FAMILY HISTORY:  Family History   Problem Relation Age of Onset   • Diabetes Maternal Grandmother    • Hypertension Maternal Grandmother    • Hyperlipidemia Maternal Grandmother    • Diabetes Maternal Grandfather    • Hyperlipidemia Maternal Grandfather    • Hypertension Maternal Grandfather    • Hyperlipidemia Paternal Grandmother    • Hypertension Paternal Grandmother    • Hypertension Paternal Grandfather    • Hyperlipidemia Paternal Grandfather        SOCIAL HISTORY:  Social History     Tobacco Use   • Smoking status: Never   • Smokeless tobacco: Never         Patient's medications, allergies, past medical, surgical, and social history  were reviewed and updated as appropriate.    Review of Systems   Constitutional: Negative.    HENT: Negative.    Eyes: Negative.    Respiratory: Negative.    Cardiovascular: Negative.     Gastrointestinal: Negative.    Endocrine: Negative.    Genitourinary: Negative.    Musculoskeletal: Negative.    Skin: Negative.    Allergic/Immunologic: Negative.    Neurological: Negative.    Hematological: Negative.    Psychiatric/Behavioral: Negative.        Objective   Physical Exam  Cardiovascular:      Comments: Dynamic auscultation negative; auscultated sitting, standing, supine, and squatting.  Upper and lower pulses palpated and are symmetrical.       Visit Vitals  /76   Pulse 100   Temp 98.6 °F (37 °C)   Resp 16   Ht 5' 4.17\" (1.63 m)   Wt 44.8 kg (98 lb 14 oz)   SpO2 98%   BMI 16.88 kg/m²       Assessment   Problem List Items Addressed This Visit    None  Visit Diagnoses     Sports physical    -  Primary    School physical exam              This is a 16 year old year-old female who presents with mother for a school physical and sports physical for volleyball.   Vision Screening    Right eye Left eye Both eyes   Without correction  20/25 20/25   With correction        Instructions provided as documented in the AVS.    Thank you for visiting [facility].  Please follow up with your PCP as needed.

## 2024-08-12 NOTE — PROGRESS NOTES
Estuardo El is a 74 y.o. male on day 1 of admission presenting with Acute renal failure, unspecified acute renal failure type (CMS-HCC).      Subjective   Feels okay today, denies any complaints. Tolerating PO. Last BM was 2 days ago, denies constipation. Stable on room air.        Objective     Last Recorded Vitals  BP (!) 138/118 (BP Location: Right arm, Patient Position: Lying)   Pulse 76   Temp 36.4 °C (97.5 °F) (Temporal)   Resp 18   Wt 92.1 kg (203 lb)   SpO2 95%   Intake/Output last 3 Shifts:    Intake/Output Summary (Last 24 hours) at 8/12/2024 1156  Last data filed at 8/12/2024 0851  Gross per 24 hour   Intake 240 ml   Output 200 ml   Net 40 ml       Admission Weight  Weight: 92.5 kg (203 lb 14.8 oz) (08/12/24 0047)    Daily Weight  08/12/24 : 92.1 kg (203 lb)    Image Results  ECG 12 lead  Sinus tachycardia  Possible Left atrial enlargement  Minimal voltage criteria for LVH, may be normal variant ( Sokolow-Azul )  ST & T wave abnormality, consider lateral ischemia  Abnormal ECG  When compared with ECG of 20-JUL-2020 10:49,  Significant changes have occurred  ECG 12 lead  Sinus tachycardia  Possible Left atrial enlargement  Minimal voltage criteria for LVH, may be normal variant ( Sokolow-Azul )  Anteroseptal infarct , age undetermined  ST & T wave abnormality, consider inferolateral ischemia  Abnormal ECG  When compared with ECG of 11-AUG-2024 01:07, (unconfirmed)  Anteroseptal infarct is now Present      Physical Exam  Constitutional:       General: He is not in acute distress.     Appearance: He is not toxic-appearing.   HENT:      Head: Normocephalic.      Mouth/Throat:      Pharynx: Oropharynx is clear.   Eyes:      General: No scleral icterus.  Cardiovascular:      Rate and Rhythm: Normal rate.   Pulmonary:      Effort: No respiratory distress.      Breath sounds: No wheezing.   Abdominal:      General: There is no distension.      Palpations: Abdomen is soft.   Musculoskeletal:      Right  lower leg: No edema.      Left lower leg: No edema.   Neurological:      Mental Status: He is alert.   Psychiatric:         Behavior: Behavior normal.         Relevant Results               Assessment/Plan          Principal Problem:    Acute renal failure, unspecified acute renal failure type (CMS-HCC)    Acute renal failure  Metabolic acidosis  Nephrology following  Continue PO bicarb  Per nephrology, may need renal biopsy if kidney function does not improve  Aldosterone, renin, and plasma metanephrines ordered, follow up results  Aspirin held in case patient needs renal biopsy     Hypertensive emergency  Cardiology following  Continue amlodipine, carvedilol, and hydralazine  Avoid ACE/ARB due to renal failure    Chronic diastolic heart failure  Echo completed this morning, follow up results  Management per cardiology    Elevated liver enzymes  Hx of Hep C  GI following - predominantly hepatocellular pattern of injury  Patient reports being treated for Hep C, follow up Hep C RNA  RUQ US showing fatty liver    Tobacco use  Continue nicotine patch  Recommend cessation    Hypokalemia  Replace PRN    Generalized weakness/debility  PT/OT - moderate intensity     Plan:  Continue antihypertensives per cardiology  Patient may need renal biopsy per nephrology, aspirin held   Check labs in the AM            Kayla Boyd MD

## 2024-08-12 NOTE — PROGRESS NOTES
"Estuardo El is a 74 y.o. male on day 1 of admission presenting with Acute renal failure, unspecified acute renal failure type (CMS-HCC).      Subjective   The patient says he is feeling better today, denies any symptoms, his creatinine is slightly trending down to 3.9, his blood pressure remains elevated but is improving.       Objective          Vitals 24HR  Heart Rate:  [73-89]   Temp:  [36.4 °C (97.5 °F)-36.8 °C (98.2 °F)]   Resp:  [15-18]   BP: (131-160)/()   Height:  [177.8 cm (5' 10\")]   Weight:  [92.1 kg (203 lb)-92.5 kg (203 lb 14.8 oz)]   SpO2:  [95 %-97 %]       Intake/Output last 3 Shifts:    Intake/Output Summary (Last 24 hours) at 8/12/2024 1325  Last data filed at 8/12/2024 0851  Gross per 24 hour   Intake 240 ml   Output 200 ml   Net 40 ml       Physical Exam  Constitutional:       General: He is awake. He is not in acute distress.  Cardiovascular:      Rate and Rhythm: Regular rhythm.      Heart sounds:      No friction rub.   Pulmonary:      Effort: Pulmonary effort is normal.      Comments: Mildly diminished/coarse breath sounds  Abdominal:      General: Bowel sounds are normal.      Palpations: Abdomen is soft.      Tenderness: There is no guarding or rebound.   Musculoskeletal:      Comments: trace edema, no cyanosis   Neurological:      Mental Status: He is alert.         Relevant Results  Results for orders placed or performed during the hospital encounter of 08/11/24 (from the past 24 hour(s))   Comprehensive Metabolic Panel   Result Value Ref Range    Glucose 82 65 - 99 mg/dL    Sodium 139 133 - 145 mmol/L    Potassium 3.3 (L) 3.4 - 5.1 mmol/L    Chloride 100 97 - 107 mmol/L    Bicarbonate 19 (L) 24 - 31 mmol/L    Urea Nitrogen 49 (H) 8 - 25 mg/dL    Creatinine 3.90 (H) 0.40 - 1.60 mg/dL    eGFR 16 (L) >60 mL/min/1.73m*2    Calcium 8.8 8.5 - 10.4 mg/dL    Albumin 3.7 3.5 - 5.0 g/dL    Alkaline Phosphatase 79 35 - 125 U/L    Total Protein 6.5 5.9 - 7.9 g/dL     (H) 5 - 40 U/L    " Bilirubin, Total 1.4 (H) 0.1 - 1.2 mg/dL     (H) 5 - 40 U/L    Anion Gap >19 (H) <=19 mmol/L   CBC   Result Value Ref Range    WBC 9.5 4.4 - 11.3 x10*3/uL    nRBC 0.9 (H) 0.0 - 0.0 /100 WBCs    RBC 3.93 (L) 4.50 - 5.90 x10*6/uL    Hemoglobin 13.0 (L) 13.5 - 17.5 g/dL    Hematocrit 37.8 (L) 41.0 - 52.0 %    MCV 96 80 - 100 fL    MCH 33.1 26.0 - 34.0 pg    MCHC 34.4 32.0 - 36.0 g/dL    RDW 14.6 (H) 11.5 - 14.5 %    Platelets 129 (L) 150 - 450 x10*3/uL   Acetaminophen   Result Value Ref Range    Acetaminophen <5.0 15.0 - 30.0 ug/mL   Phosphorus   Result Value Ref Range    Phosphorus 4.6 (H) 2.5 - 4.5 mg/dL   Transthoracic Echo (TTE) Complete   Result Value Ref Range    AV pk susanna 1.19 m/s    LVOT diam 2.03 cm    AV mn grad 3.1 mmHg    MV E/A ratio 0.52     Tricuspid annular plane systolic excursion 2.1 cm    LV Biplane EF 37 %    LA vol index A/L 32.6 ml/m2    MV avg E/e' ratio 10.57     LV EF 43 %    RV free wall pk S' 11.17 cm/s    LV GLS -9.3 %    RVSP 27.8 mmHg    LVIDd 4.98 cm    AV pk grad 5.7 mmHg    Aortic Valve Area by Continuity of VTI 2.38 cm2    Aortic Valve Area by Continuity of Peak Velocity 2.75 cm2    LV A4C EF 40.0             Assessment/Plan   Acute kidney injury, the differential diagnosis is broad given his complex medical history of congestive heart failure, hepatitis C, BPH, uncontrolled hypertension, workup is in progress  Hypertensive urgency, improving  Congestive heart failure  Hepatitis C  BPH  Metabolic acidosis  Elevated liver enzymes     Plan: His urinalysis shows 2+ protein, 2+ blood although only 1-2 red blood cells.  Hepatitis C RNA is pending.  Will check cryoglobulins, C3, C4, urine protein to creatinine ratio, ANCA, ethylene glycol and methanol level.  The patient's creatinine is starting to trend down however ideally would want a perform a kidney biopsy however since he got aspirin will have to wait until early next week, if it is needed. noted the aspirin was held, will  continue to monitor his renal function closely and if there is no significant improvement then may need a renal biopsy on Monday this coming week.  Monitor blood pressure closely.   I would avoid any further diuretics or IV fluids at this time.  Pending renin, aldosterone, metanephrines.  Continue oral bicarbonate.  Cardiology and GI on board.  No dialysis needs, monitor closely. He will need outpatient follow-up for complex renal cyst with urology.     René Peralta MD

## 2024-08-12 NOTE — PROGRESS NOTES
Occupational Therapy    Evaluation    Patient Name: Estuardo El  MRN: [id number]  Today's Date: 8/12/2024  Time Calculation  Start Time: 0853  Stop Time: 0910  Time Calculation (min): 17 min    Assessment  IP OT Assessment  OT Assessment: OT order received, chart reviewed, evaluation completed. Pt demonstrated impairments in ADLs and functional mobility requiring mod A x2, impaired cognition and would benefit from acute OT services to facilitate return to PLOF.  Prognosis: Good  Barriers to Discharge: Decreased caregiver support  Evaluation/Treatment Tolerance: Patient limited by fatigue  Medical Staff Made Aware: Yes  End of Session Communication: Bedside nurse  End of Session Patient Position: Alarm on, Up in chair  Plan:  Treatment Interventions: ADL retraining, Functional transfer training, Endurance training, Patient/family training, Equipment evaluation/education, Compensatory technique education  OT Frequency: 3 times per week  OT Discharge Recommendations: Moderate intensity level of continued care  Equipment Recommended upon Discharge: Wheeled walker  OT Recommended Transfer Status: Moderate assist, Assist of 2  OT - OK to Discharge: Yes    Subjective     General:  General  Reason for Referral: activities of daily living, 74 year old male admit from home with weakness, fatigue, SOB, cough, chest pain, and poor oral intake  Past Medical History Relevant to Rehab: remote ETOH, hep C, CHF, HTN, back surgery  Co-Treatment: PT  Co-Treatment Reason: Co-eval due to limited tolerance to activity  Prior to Session Communication: Bedside nurse  Patient Position Received: Bed, 3 rail up, Alarm on  Preferred Learning Style: auditory  General Comment: Pt cleared by nursing, agreeable to evaluation  Precautions:  Hearing/Visual Limitations: WFL  Medical Precautions: Fall precautions    Pain:  Pain Assessment  Pain Assessment: 0-10  0-10 (Numeric) Pain Score: 0 - No pain    Objective   Cognition:  Overall Cognitive  Status: Impaired (Oriented to person and time;  unable to state name of hospital or reason for hospital admission;  memory deficits)  Orientation Level: Disoriented to situation  Following Commands: Follows one step commands with increased time  Memory:  (impaired)  Safety/Judgement:  (mild)  Insight: Mild        Home Living:  Type of Home: Apartment  Lives With: Alone  Home Adaptive Equipment: Walker rolling or standard  Home Living Comments: Unable to accurately assess home environment due to memory deficits   Prior Function:  Level of [place]: Independent with ADLs and functional transfers, Independent with homemaking with ambulation  ADL Assistance: Independent  Homemaking Assistance: Independent  Ambulatory Assistance:  (mod ind with rollator)    ADL:  Eating Deficit: Setup  Grooming Assistance: Minimal  Bathing Assistance: Moderate  Bathing Deficit:  (projected due to current functional status)  UE Dressing Assistance: Minimal  UE Dressing Deficit:  (donning backside gown)  LE Dressing Assistance: Maximal  LE Dressing Deficit: Don/doff R sock, Don/doff L sock  Toileting Assistance with Device: Moderate  Functional Assistance: Moderate  Activity Tolerance:  Endurance: Decreased tolerance for upright activites  Activity Tolerance Comments: fatigue  Bed Mobility/Transfers: Bed Mobility  Bed Mobility: Yes  Bed Mobility 1  Bed Mobility 1: Supine to sitting  Level of Assistance 1: Moderate assistance, +2  Bed Mobility Comments 1: Assist for B LEand trunk to EOB pt able to initiate grabbing rail with VCs for transfer technique.    Transfers  Transfer: Yes  Transfer 1  Transfer From 1: Sit to  Transfer to 1: Stand  Technique 1: Sit to stand  Transfer Device 1: Walker  Transfer Level of Assistance 1: Arm in arm assistance, Moderate assistance (x2)  Trials/Comments 1: assist for elevation from surface, balance, and cues for safe hand placement. Pt able to take several steps to bedside chair with mod A x2  Transfers  2  Transfer From 2: Stand to  Transfer to 2: Sit, Chair with arms  Technique 2: Stand to sit  Transfer Device 2: Walker  Transfer Level of Assistance 2: Arm in arm assistance, Moderate assistance, +2  Trials/Comments 2: Mod A x2 to control descent into chair due to retropulsion andnot placing hands safely    Functional Mobility:  Functional Mobility  Functional Mobility Performed: No    Sensation:  Light Touch: No apparent deficits  Strength:  Strength Comments: 3/5 overall B UES    Coordination:  Movements are Fluid and Coordinated: No  Upper Body Coordination: delayed rate and accuracy of movements   Hand Function:  Hand Function  Gross Grasp: Functional  Coordination: Functional  Extremities: RUE   RUE : Within Functional Limits and LUE   LUE: Within Functional Limits    Outcome Measures: [facility] Daily Activity  Putting on and taking off regular lower body clothing: A lot  Bathing (including washing, rinsing, drying): A lot  Putting on and taking off regular upper body clothing: A lot  Toileting, which includes using toilet, bedpan or urinal: A lot  Taking care of personal grooming such as brushing teeth: A little  Eating Meals: A little  Daily Activity - Total Score: 14      Education Documentation  Precautions, taught by Beena Tavarez OT at 8/12/2024 10:45 AM.  Learner: Patient  Readiness: Acceptance  Method: Explanation  Response: Verbalizes Understanding, Needs Reinforcement  Comment: Pt edu on OT POC    ADL Training, taught by Beena Tavarez OT at 8/12/2024 10:45 AM.  Learner: Patient  Readiness: Acceptance  Method: Explanation  Response: Verbalizes Understanding, Needs Reinforcement  Comment: Pt edu on OT POC        Goals:   Encounter Problems       Encounter Problems (Active)       OT Goals       ADLs       Start:  08/12/24    Expected End:  08/30/24       Pt will complete ADL tasks with Mod I, using AE as needed, in order to complete self-care tasks.           Functional transfers       Start:  08/12/24     Expected End:  08/30/24            Functional mobility       Start:  08/12/24    Expected End:  08/30/24       Pt will perform functional mobility household distance at mod ind level with RW

## 2024-08-12 NOTE — PROGRESS NOTES
"TCC spoke to patient at bedside. States he lives home alone in an apartment. States that he uses a rolator. Has grab bars and shower chiar in bathroom. Reports smoking about 2 packs of cigarettes a day and when asked about alcohol use patient states \" as much as possible\" when asked about drug use patient state he smokes marijuana \"as much as I can get my hands on\". PCP is [facility]. [facility]. Patient states his son is POA. Patient awaiting therapy evaluations. Agreeable to SNF if that is the recommendation.      08/12/24 1100   Discharge Planning   Living Arrangements Alone   Support Systems Children   Assistance Needed Uses a rolator   Type of Residence Private residence   Number of Stairs to Enter Residence 6   Number of Stairs Within Residence 0   Do you have animals or pets at home? No   Who is requesting discharge planning? Provider   Expected Discharge Disposition Other  (awaitin therapy)   Does the patient need discharge transport arranged? No   Financial Resource Strain   How hard is it for you to pay for the very basics like food, housing, medical care, and heating? Somewhat   Housing Stability   In the last 12 months, was there a time when you were not able to pay the mortgage or rent on time? N   In the past 12 months, how many times have you moved where you were living? 0   At any time in the past 12 months, were you homeless or living in a shelter (including now)? N   Transportation Needs   In the past 12 months, has lack of transportation kept you from medical appointments or from getting medications? no   In the past 12 months, has lack of transportation kept you from meetings, work, or from getting things needed for daily living? No       "

## 2024-08-12 NOTE — CARE PLAN
The patient's goals for the shift include      The clinical goals for the shift include      Over the shift, the patient did not make progress toward the following goals. Barriers to progression include   Problem: Skin  Goal: Decreased wound size/increased tissue granulation at next dressing change  Outcome: Progressing  Flowsheets (Taken 8/12/2024 0117)  Decreased wound size/increased tissue granulation at next dressing change: Promote sleep for wound healing  Goal: Participates in plan/prevention/treatment measures  Outcome: Progressing  Flowsheets (Taken 8/12/2024 0117)  Participates in plan/prevention/treatment measures: Elevate heels  Goal: Prevent/manage excess moisture  Outcome: Progressing  Flowsheets (Taken 8/12/2024 0117)  Prevent/manage excess moisture: Monitor for/manage infection if present  Goal: Prevent/minimize sheer/friction injuries  Outcome: Progressing  Flowsheets (Taken 8/12/2024 0117)  Prevent/minimize sheer/friction injuries: HOB 30 degrees or less  Goal: Promote/optimize nutrition  Outcome: Progressing  Flowsheets (Taken 8/12/2024 0117)  Promote/optimize nutrition: Monitor/record intake including meals  Goal: Promote skin healing  Outcome: Progressing  Flowsheets (Taken 8/12/2024 0117)  Promote skin healing: Turn/reposition every 2 hours/use positioning/transfer devices     Problem: Fall/Injury  Goal: Not fall by end of shift  Outcome: Progressing  Goal: Be free from injury by end of the shift  Outcome: Progressing  Goal: Verbalize understanding of personal risk factors for fall in the hospital  Outcome: Progressing  Goal: Verbalize understanding of risk factor reduction measures to prevent injury from fall in the home  Outcome: Progressing  Goal: Use assistive devices by end of the shift  Outcome: Progressing  Goal: Pace activities to prevent fatigue by end of the shift  Outcome: Progressing     Problem: Renal Failure  Goal: I will maintain optimum fluid volume with treatment  Outcome:  Progressing     Problem: Recent hospitalization or complication while living with HTN  Goal: Patient will effectively self-manage their HTN disease process  Outcome: Progressing   . Recommendations to address these barriers include .

## 2024-08-12 NOTE — CONSULTS
"Nutrition Assessement Note    Nutrition Assessment    Reason for Assessment: Admission nursing screening (loss of appetite)    Reason for Hospital Admission:  Estuardo El is a 74 y.o. male who is admitted for SOB, possible STEMI. MD notes patient may need renal biopsy, per Nephrology. Patient reports poor appetite for a couple of weeks prior to admission but states appetite is back to usual, declines nutritional supplements.    Past Medical History:   Diagnosis Date    CHF (congestive heart failure) (Multi)     Hepatitis C     Hypertension       Past Surgical History:   Procedure Laterality Date    BACK SURGERY         Nutrition History:  Food and Nutrient History: patient reports appetite greatly improved  Energy Intake: Good > 75 %  Food Allergies/Intolerances:  None  GI Symptoms: None  Oral Problems: None    Anthropometrics:  Ht: 177.8 cm (5' 10\"), Wt: 92.1 kg (203 lb), BMI: 29.13  IBW/kg (Dietitian Calculated): 75.45 kg  Percent of IBW: 122 %     Weight Change:  Daily Weight  08/12/24 : 92.1 kg (203 lb)  02/01/22 : 109 kg (240 lb)  08/10/21 : 103 kg (228 lb)  02/09/21 : 92.5 kg (204 lb)  12/15/20 : 81.6 kg (180 lb)  09/17/20 : 71.2 kg (157 lb)  08/04/20 : 67.6 kg (149 lb)  06/17/20 : 69.4 kg (153 lb)  03/03/20 : 72.6 kg (160 lb)  05/23/18 : 77.1 kg (169 lb 15.6 oz)     Weight History / % Weight Change: patient reports stable weight  Significant Weight Loss: No        Nutrition Focused Physical Exam Findings:   Subcutaneous Fat Loss  Orbital Fat Pads: Mild-Moderate (slight dark circles and slight hollowing)  Buccal Fat Pads: Mild-Moderate (flat cheeks, minimal bounce)  Triceps: Mild-Moderate (less than ample fat tissue)    Muscle Wasting  Temporalis: Mild-Moderate (slight depression)  Pectoralis (Clavicular Region): Well nourished (clavicle not visible)  Deltoid/Trapezius: Well nourished (rounded appearance at arm, shoulder, neck)  Interosseous: Well nourished (muscle bulges)    Nutrition Significant Labs:  Lab " Results   Component Value Date    WBC 9.5 08/12/2024    HGB 13.0 (L) 08/12/2024    HCT 37.8 (L) 08/12/2024     (L) 08/12/2024    CHOL 130 (L) 08/11/2024    TRIG 138 08/11/2024    HDL 22.0 (L) 08/11/2024     (H) 08/12/2024     (H) 08/12/2024     08/12/2024    K 3.3 (L) 08/12/2024     08/12/2024    CREATININE 3.90 (H) 08/12/2024    BUN 49 (H) 08/12/2024    CO2 19 (L) 08/12/2024    TSH 1.23 07/20/2020    INR 1.0 07/18/2020    HGBA1C 4.8 08/11/2024     Nutrition Specific Medications:  amLODIPine, 10 mg, oral, Daily  [Held by provider] aspirin, 81 mg, oral, Daily  carvedilol, 12.5 mg, oral, BID  hydrALAZINE, 50 mg, oral, TID  nicotine, 1 patch, transdermal, Daily  perflutren protein A microsphere, 0.5 mL, intravenous, Once in imaging  sodium bicarbonate, 650 mg, oral, BID  sulfur hexafluoride microsphr, 2 mL, intravenous, Once in imaging        Dietary Orders (From admission, onward)       Start     Ordered    08/11/24 0940  Adult diet Regular, Cardiac; 70 gm fat; 2 - 3 grams Sodium  Diet effective now        Question Answer Comment   Diet type Regular    Diet type Cardiac    Fat restriction: 70 gm fat    Sodium restriction: 2 - 3 grams Sodium        08/11/24 0939                   Estimated Needs:   Estimated Energy Needs  Total Energy Estimated Needs (kCal):  (1609-4576)  Total Estimated Energy Need per Day (kCal/kg):  (30-35)  Method for Estimating Needs: IBW    Estimated Protein Needs  Total Protein Estimated Needs (g):  (75-90)  Total Protein Estimated Needs (g/kg):  (1-1.2)  Method for Estimating Needs: IBW    Estimated Fluid Needs  Total Fluid Estimated Needs (mL): 1875 mL  Total Fluid Estimated Needs (mL/kg): 25 mL/kg  Method for Estimating Needs: IBW      Nutrition Diagnosis   Nutrition Diagnosis:     Nutrition Diagnosis  Patient has Nutrition Diagnosis: Yes  Diagnosis Status (1): New  Nutrition Diagnosis 1: Increased nutrient needs  Related to (1): increased demand for  nutrients  As Evidenced by (1): hepatitis     Nutrition Interventions/Recommendations   Nutrition Interventions and Recommendations:    Nutrition Prescription:  Individualized Nutrition Prescription Provided for : 1277-5709 calories, 75-90 gm protein via oral diet    Nutrition Interventions:   Food and/or Nutrient Delivery Interventions  Interventions: Meals and snacks  Meals and Snacks: Fat-modified diet, Mineral-modified diet  Goal: provide as ordered    Education Documentation  No documentation found.         Nutrition Monitoring and Evaluation   Monitoring/Evaluation:   Food/Nutrient Related History Monitoring  Monitoring and Evaluation Plan: Energy intake  Energy Intake: Estimated energy intake  Criteria: pt to consume >/= 75% estimated needs       Time Spent/Follow-up:   Follow Up  Time Spent (min): 30 minutes  Last Date of Nutrition Visit: 08/12/24  Nutrition Follow-Up Needed?: 7-10 days  Follow up Comment: 8/19/24

## 2024-08-13 ENCOUNTER — APPOINTMENT (OUTPATIENT)
Dept: RADIOLOGY | Facility: HOSPITAL | Age: 74
DRG: 682 | End: 2024-08-13
Payer: MEDICARE

## 2024-08-13 LAB
AFP SERPL-MCNC: <4 NG/ML (ref 0–9)
ALBUMIN SERPL-MCNC: 3.5 G/DL (ref 3.5–5)
ALDOST SERPL-MCNC: 47 NG/DL
ALP BLD-CCNC: 72 U/L (ref 35–125)
ALT SERPL-CCNC: 319 U/L (ref 5–40)
AMMONIA PLAS-SCNC: 29 UMOL/L (ref 12–45)
ANION GAP SERPL CALC-SCNC: 15 MMOL/L
AST SERPL-CCNC: 206 U/L (ref 5–40)
BILIRUB SERPL-MCNC: 1 MG/DL (ref 0.1–1.2)
BUN SERPL-MCNC: 49 MG/DL (ref 8–25)
C3 SERPL-MCNC: 70 MG/DL (ref 87–200)
C4 SERPL-MCNC: 18 MG/DL (ref 10–50)
CALCIUM SERPL-MCNC: 8.6 MG/DL (ref 8.5–10.4)
CHLORIDE SERPL-SCNC: 100 MMOL/L (ref 97–107)
CO2 SERPL-SCNC: 22 MMOL/L (ref 24–31)
CREAT SERPL-MCNC: 3.4 MG/DL (ref 0.4–1.6)
EGFRCR SERPLBLD CKD-EPI 2021: 18 ML/MIN/1.73M*2
ERYTHROCYTE [DISTWIDTH] IN BLOOD BY AUTOMATED COUNT: 15.7 % (ref 11.5–14.5)
GLUCOSE BLD MANUAL STRIP-MCNC: 118 MG/DL (ref 74–99)
GLUCOSE SERPL-MCNC: 88 MG/DL (ref 65–99)
HCT VFR BLD AUTO: 38.7 % (ref 41–52)
HCV RNA SERPL NAA+PROBE-ACNC: ABNORMAL IU/ML
HCV RNA SERPL NAA+PROBE-ACNC: DETECTED K[IU]/ML
HCV RNA SERPL NAA+PROBE-LOG IU: 5.77 LOG IU/ML
HGB BLD-MCNC: 13 G/DL (ref 13.5–17.5)
INR PPP: 1.2 (ref 0.9–1.2)
MCH RBC QN AUTO: 33.1 PG (ref 26–34)
MCHC RBC AUTO-ENTMCNC: 33.6 G/DL (ref 32–36)
MCV RBC AUTO: 99 FL (ref 80–100)
NRBC BLD-RTO: 0.9 /100 WBCS (ref 0–0)
PLATELET # BLD AUTO: 129 X10*3/UL (ref 150–450)
POTASSIUM SERPL-SCNC: 3.4 MMOL/L (ref 3.4–5.1)
PROT SERPL-MCNC: 6.3 G/DL (ref 5.9–7.9)
PROTHROMBIN TIME: 12.8 SECONDS (ref 9.3–12.7)
RBC # BLD AUTO: 3.93 X10*6/UL (ref 4.5–5.9)
SODIUM SERPL-SCNC: 137 MMOL/L (ref 133–145)
WBC # BLD AUTO: 9 X10*3/UL (ref 4.4–11.3)

## 2024-08-13 PROCEDURE — 2060000001 HC INTERMEDIATE ICU ROOM DAILY

## 2024-08-13 PROCEDURE — 2500000004 HC RX 250 GENERAL PHARMACY W/ HCPCS (ALT 636 FOR OP/ED): Performed by: STUDENT IN AN ORGANIZED HEALTH CARE EDUCATION/TRAINING PROGRAM

## 2024-08-13 PROCEDURE — 94664 DEMO&/EVAL PT USE INHALER: CPT

## 2024-08-13 PROCEDURE — 99232 SBSQ HOSP IP/OBS MODERATE 35: CPT | Performed by: NURSE PRACTITIONER

## 2024-08-13 PROCEDURE — 87521 HEPATITIS C PROBE&RVRS TRNSC: CPT | Mod: WESLAB

## 2024-08-13 PROCEDURE — 82140 ASSAY OF AMMONIA: CPT | Performed by: STUDENT IN AN ORGANIZED HEALTH CARE EDUCATION/TRAINING PROGRAM

## 2024-08-13 PROCEDURE — 2500000002 HC RX 250 W HCPCS SELF ADMINISTERED DRUGS (ALT 637 FOR MEDICARE OP, ALT 636 FOR OP/ED): Performed by: HOSPITALIST

## 2024-08-13 PROCEDURE — 2500000001 HC RX 250 WO HCPCS SELF ADMINISTERED DRUGS (ALT 637 FOR MEDICARE OP): Performed by: HOSPITALIST

## 2024-08-13 PROCEDURE — 94640 AIRWAY INHALATION TREATMENT: CPT

## 2024-08-13 PROCEDURE — 82947 ASSAY GLUCOSE BLOOD QUANT: CPT

## 2024-08-13 PROCEDURE — 82595 ASSAY OF CRYOGLOBULIN: CPT | Performed by: STUDENT IN AN ORGANIZED HEALTH CARE EDUCATION/TRAINING PROGRAM

## 2024-08-13 PROCEDURE — 36415 COLL VENOUS BLD VENIPUNCTURE: CPT | Performed by: HOSPITALIST

## 2024-08-13 PROCEDURE — 94762 N-INVAS EAR/PLS OXIMTRY CONT: CPT

## 2024-08-13 PROCEDURE — 2500000004 HC RX 250 GENERAL PHARMACY W/ HCPCS (ALT 636 FOR OP/ED): Performed by: INTERNAL MEDICINE

## 2024-08-13 PROCEDURE — 70450 CT HEAD/BRAIN W/O DYE: CPT | Performed by: STUDENT IN AN ORGANIZED HEALTH CARE EDUCATION/TRAINING PROGRAM

## 2024-08-13 PROCEDURE — 97535 SELF CARE MNGMENT TRAINING: CPT | Mod: GO

## 2024-08-13 PROCEDURE — 97530 THERAPEUTIC ACTIVITIES: CPT | Mod: GP

## 2024-08-13 PROCEDURE — 2500000002 HC RX 250 W HCPCS SELF ADMINISTERED DRUGS (ALT 637 FOR MEDICARE OP, ALT 636 FOR OP/ED): Performed by: STUDENT IN AN ORGANIZED HEALTH CARE EDUCATION/TRAINING PROGRAM

## 2024-08-13 PROCEDURE — 87902 NFCT AGT GNTYP ALYS HEP C: CPT

## 2024-08-13 PROCEDURE — 97110 THERAPEUTIC EXERCISES: CPT | Mod: GO

## 2024-08-13 PROCEDURE — 85610 PROTHROMBIN TIME: CPT

## 2024-08-13 PROCEDURE — 70450 CT HEAD/BRAIN W/O DYE: CPT

## 2024-08-13 PROCEDURE — 85027 COMPLETE CBC AUTOMATED: CPT | Performed by: HOSPITALIST

## 2024-08-13 PROCEDURE — 99232 SBSQ HOSP IP/OBS MODERATE 35: CPT | Performed by: STUDENT IN AN ORGANIZED HEALTH CARE EDUCATION/TRAINING PROGRAM

## 2024-08-13 PROCEDURE — 36415 COLL VENOUS BLD VENIPUNCTURE: CPT

## 2024-08-13 PROCEDURE — S4991 NICOTINE PATCH NONLEGEND: HCPCS | Performed by: HOSPITALIST

## 2024-08-13 PROCEDURE — 2500000001 HC RX 250 WO HCPCS SELF ADMINISTERED DRUGS (ALT 637 FOR MEDICARE OP): Performed by: INTERNAL MEDICINE

## 2024-08-13 PROCEDURE — 2500000001 HC RX 250 WO HCPCS SELF ADMINISTERED DRUGS (ALT 637 FOR MEDICARE OP): Performed by: STUDENT IN AN ORGANIZED HEALTH CARE EDUCATION/TRAINING PROGRAM

## 2024-08-13 PROCEDURE — 84075 ASSAY ALKALINE PHOSPHATASE: CPT | Performed by: HOSPITALIST

## 2024-08-13 RX ORDER — POLYETHYLENE GLYCOL 3350 17 G/17G
17 POWDER, FOR SOLUTION ORAL DAILY
Status: DISCONTINUED | OUTPATIENT
Start: 2024-08-13 | End: 2024-08-22 | Stop reason: HOSPADM

## 2024-08-13 RX ORDER — POTASSIUM CHLORIDE 20 MEQ/1
20 TABLET, EXTENDED RELEASE ORAL ONCE
Status: COMPLETED | OUTPATIENT
Start: 2024-08-13 | End: 2024-08-13

## 2024-08-13 RX ORDER — HEPARIN SODIUM 5000 [USP'U]/ML
5000 INJECTION, SOLUTION INTRAVENOUS; SUBCUTANEOUS EVERY 8 HOURS
Status: DISCONTINUED | OUTPATIENT
Start: 2024-08-13 | End: 2024-08-22 | Stop reason: HOSPADM

## 2024-08-13 RX ADMIN — POLYETHYLENE GLYCOL 3350 17 G: 17 POWDER, FOR SOLUTION ORAL at 09:25

## 2024-08-13 RX ADMIN — SODIUM BICARBONATE 650 MG: 650 TABLET ORAL at 21:19

## 2024-08-13 RX ADMIN — SODIUM BICARBONATE 650 MG: 650 TABLET ORAL at 08:05

## 2024-08-13 RX ADMIN — HYDRALAZINE HYDROCHLORIDE 50 MG: 50 TABLET ORAL at 08:03

## 2024-08-13 RX ADMIN — AMLODIPINE BESYLATE 10 MG: 10 TABLET ORAL at 08:05

## 2024-08-13 RX ADMIN — CARVEDILOL 12.5 MG: 12.5 TABLET, FILM COATED ORAL at 06:29

## 2024-08-13 RX ADMIN — Medication 1 PATCH: at 08:11

## 2024-08-13 RX ADMIN — HYDRALAZINE HYDROCHLORIDE 50 MG: 50 TABLET ORAL at 21:18

## 2024-08-13 RX ADMIN — POTASSIUM CHLORIDE 20 MEQ: 1500 TABLET, EXTENDED RELEASE ORAL at 08:04

## 2024-08-13 ASSESSMENT — COGNITIVE AND FUNCTIONAL STATUS - GENERAL
EATING MEALS: A LITTLE
MOVING FROM LYING ON BACK TO SITTING ON SIDE OF FLAT BED WITH BEDRAILS: A LOT
CLIMB 3 TO 5 STEPS WITH RAILING: TOTAL
MOVING FROM LYING ON BACK TO SITTING ON SIDE OF FLAT BED WITH BEDRAILS: A LOT
DRESSING REGULAR LOWER BODY CLOTHING: A LOT
TURNING FROM BACK TO SIDE WHILE IN FLAT BAD: A LOT
DRESSING REGULAR UPPER BODY CLOTHING: A LOT
CLIMB 3 TO 5 STEPS WITH RAILING: TOTAL
DRESSING REGULAR UPPER BODY CLOTHING: A LOT
TOILETING: A LOT
STANDING UP FROM CHAIR USING ARMS: A LOT
DAILY ACTIVITIY SCORE: 13
HELP NEEDED FOR BATHING: A LOT
HELP NEEDED FOR BATHING: A LOT
TOILETING: A LOT
MOVING TO AND FROM BED TO CHAIR: A LOT
MOVING TO AND FROM BED TO CHAIR: A LOT
EATING MEALS: A LITTLE
STANDING UP FROM CHAIR USING ARMS: A LOT
WALKING IN HOSPITAL ROOM: A LOT
MOBILITY SCORE: 11
WALKING IN HOSPITAL ROOM: A LOT
PERSONAL GROOMING: A LOT
DRESSING REGULAR LOWER BODY CLOTHING: A LOT
PERSONAL GROOMING: A LOT
MOBILITY SCORE: 11
TURNING FROM BACK TO SIDE WHILE IN FLAT BAD: A LOT
DAILY ACTIVITIY SCORE: 13

## 2024-08-13 ASSESSMENT — PAIN - FUNCTIONAL ASSESSMENT
PAIN_FUNCTIONAL_ASSESSMENT: 0-10

## 2024-08-13 ASSESSMENT — PAIN SCALES - GENERAL
PAINLEVEL_OUTOF10: 0 - NO PAIN

## 2024-08-13 ASSESSMENT — ACTIVITIES OF DAILY LIVING (ADL): HOME_MANAGEMENT_TIME_ENTRY: 8

## 2024-08-13 NOTE — PROGRESS NOTES
Estuardo Flores is a 74 y.o. male on day 2 of admission presenting with Acute renal failure, unspecified acute renal failure type (CMS-HCC).      Subjective   Sitting up at the side of the bed. States he feels okay today. Denies SOB, chest pain, abdominal pain. Has not had a BM but denies constipation.  Stable on room air.        Objective     Last Recorded Vitals  /60 (BP Location: Left arm, Patient Position: Lying)   Pulse 75   Temp 36.2 °C (97.2 °F) (Temporal)   Resp 16   Wt 67.6 kg (149 lb 0.5 oz)   SpO2 95%   Intake/Output last 3 Shifts:    Intake/Output Summary (Last 24 hours) at 8/13/2024 1405  Last data filed at 8/13/2024 1033  Gross per 24 hour   Intake 240 ml   Output 200 ml   Net 40 ml       Admission Weight  Weight: 92.5 kg (203 lb 14.8 oz) (08/12/24 0047)    Daily Weight  08/13/24 : 67.6 kg (149 lb 0.5 oz)    Image Results  ECG 12 lead  Sinus tachycardia  Possible Left atrial enlargement  Left ventricular hypertrophy with repolarization abnormality  Poor R-wave progression  Cannot rule out Anteroseptal infarct , age undetermined  ST & T wave abnormality, consider inferolateral ischemia  Abnormal ECG  When compared with ECG of 11-AUG-2024 01:07, (unconfirmed)  Anteroseptal infarct is now Present  Confirmed by Samir Sams ([id number]) on 8/12/2024 9:46:06 PM  ECG 12 lead  Sinus tachycardia  Possible Left atrial enlargement  Left ventricular hypertrophy with repolarization abnormality  ST & T wave abnormality, consider lateral ischemia  Abnormal ECG  When compared with ECG of 20-JUL-2020 10:49,  Significant changes have occurred  Confirmed by Samir Sams ([id number]) on 8/12/2024 9:44:46 PM  Transthoracic Echo (TTE) [facility]  [address] , [place] [address]              Phone [phone]    TRANSTHORACIC ECHOCARDIOGRAM REPORT    Patient Name:      ESTUARDO FLORES         Reading Physician:    [id number] Meng                                                                  Percy LAROSE  Study Date:        8/12/2024             Ordering Provider:    [id number] FLORENTINOANKITA VALENTINE  MRN/PID:           [id number]              Fellow:  Accession#:        [id number]          Nurse:  Date of Birth/Age: 1950 / 74 years  Sonographer:          Raquel Celestin  Gender:            M                     Additional Staff:  Height:            177.80 cm             Admit Date:  Weight:            92.08 kg              Admission Status:     Inpatient -                                                                 Routine  BSA / BMI:         2.10 m2 / 29.13 kg/m2 Department Location:  [facility]  Blood Pressure: 138 /118 mmHg    Study Type:    TRANSTHORACIC ECHO (TTE) COMPLETE  Diagnosis/ICD: Acute on chronic diastolic (congestive) heart failure                 (CHF)-I50.33  CPT Codes:     Echo Complete w Full Doppler-50035    Patient History:  Pertinent History: CHF and HTN.    Study Detail: The following Echo studies were performed: 2D, Doppler, color                flow, Strain and 3D. Definity used as a contrast agent for                endocardial border definition. Total contrast used for this                procedure was 1 mL via IV push.       PHYSICIAN INTERPRETATION:  Left Ventricle: Left ventricular ejection fraction is mildly decreased, by visual estimate at 40-45%. There is global hypokinesis of the left ventricle with minor regional variations. The left ventricular cavity size is normal. Left Ventricular Global Longitudinal Strain - -9.3 %. Spectral Doppler shows an impaired relaxation pattern of left ventricular diastolic filling.  Left Atrium: The left atrium is normal in size.  Right Ventricle: The right ventricle is normal in size. There is low normal right ventricular systolic function.  Right Atrium: The right atrium is mildly dilated.  Aortic Valve: The aortic valve is trileaflet. The aortic valve  dimensionless index is 0.74. There is no evidence of aortic valve regurgitation. The peak instantaneous gradient of the aortic valve is 5.7 mmHg. The mean gradient of the aortic valve is 3.1 mmHg.  Mitral Valve: The mitral valve is mildly thickened. There is trace mitral valve regurgitation.  Tricuspid Valve: The tricuspid valve is structurally normal. There is trace tricuspid regurgitation.  Pulmonic Valve: The pulmonic valve is structurally normal. There is no indication of pulmonic valve regurgitation.  Pericardium: There is no pericardial effusion noted.  Aorta: The aortic root is normal.       CONCLUSIONS:   1. Left ventricular ejection fraction is mildly decreased, by visual estimate at 40-45%.   2. Spectral Doppler shows an impaired relaxation pattern of left ventricular diastolic filling.   3. There is low normal right ventricular systolic function.    QUANTITATIVE DATA SUMMARY:  2D MEASUREMENTS:                            Normal Ranges:  IVSd:          1.38 cm    (0.6-1.1cm)  LVPWd:         0.88 cm    (0.6-1.1cm)  LVIDd:         4.98 cm    (3.9-5.9cm)  LVIDs:         4.34 cm  LV Mass Index: 101.3 g/m2  LV % FS        12.8 %    LA VOLUME:                               Normal Ranges:  LA Vol A4C:       58.1 ml    (22+/-6mL/m2)  LA Vol A2C:       74.1 ml  LA Vol BP:        68.5 ml  LA Vol Index A4C: 27.7 ml/m2  LA Vol Index A2C: 35.3 ml/m2  LA Vol Index BP:  32.6 ml/m2  LA Volume Index:  32.6 ml/m2  LA Vol A4C:       53.3 ml  LA Vol A2C:       69.6 ml  LA Vol Index BSA: 29.3 ml/m2    RA VOLUME BY A/L METHOD:                        Normal Ranges:  RA Area A4C: 22.3 cm2    AORTA MEASUREMENTS:                       Normal Ranges:  Ao Sinus, d: 3.10 cm (2.1-3.5cm)  Ao STJ, d:   2.30 cm (1.7-3.4cm)  Asc Ao, d:   3.20 cm (2.1-3.4cm)    LV SYSTOLIC FUNCTION BY 2D PLANIMETRY (MOD):                                         Normal Ranges:  EF-A4C View:                      40 % (>=55%)  EF-A2C View:                       40 %  EF-Biplane:                       37 %  EF-Visual:                        43 %  EF-3DQ:                           35 %  EF-Auto:                          35 %  LV EF Reported:                   43 %  Global Longitudinal Strain (GLS): -9 %    LV DIASTOLIC FUNCTION:                          Normal Ranges:  MV Peak E:    0.49 m/s  (0.7-1.2 m/s)  MV Peak A:    0.93 m/s  (0.42-0.7 m/s)  E/A Ratio:    0.52      (1.0-2.2)  MV e'         0.047 m/s (>8.0)  MV lateral e' 0.06 m/s  MV medial e'  0.04 m/s  E/e' Ratio:   10.27     (<8.0)    MITRAL VALVE:                  Normal Ranges:  MV DT: 227 msec (150-240msec)    AORTIC VALVE:                                    Normal Ranges:  AoV Vmax:                1.19 m/s (<=1.7m/s)  AoV Peak P.7 mmHg (<20mmHg)  AoV Mean PG:             3.1 mmHg (1.7-11.5mmHg)  LVOT Max Ady:            1.02 m/s (<=1.1m/s)  AoV VTI:                 19.50 cm (18-25cm)  LVOT VTI:                14.35 cm  LVOT Diameter:           2.03 cm  (1.8-2.4cm)  AoV Area, VTI:           2.38 cm2 (2.5-5.5cm2)  AoV Area,Vmax:           2.75 cm2 (2.5-4.5cm2)  AoV Dimensionless Index: 0.74       RIGHT VENTRICLE:  RV Basal 4.01 cm  TAPSE:   20.9 mm  RV s'    0.11 m/s    TRICUSPID VALVE/RVSP:                              Normal Ranges:  Peak TR Velocity: 2.49 m/s  RV Syst Pressure: 27.8 mmHg (< 30mmHg)  IVC Diam:         1.68 cm    AORTA:  Asc Ao Diam 3.21 cm       [id number] Meng Percy LAROSE  Electronically signed on 2024 at 1:25:24 PM       ** Final **      Physical Exam  Constitutional:       General: He is not in acute distress.     Appearance: He is not toxic-appearing.   HENT:      Head: Normocephalic.      Mouth/Throat:      Pharynx: Oropharynx is clear.   Eyes:      General: No scleral icterus.  Cardiovascular:      Rate and Rhythm: Normal rate.   Pulmonary:      Effort: No respiratory distress.      Breath sounds: No wheezing.   Abdominal:      General: There is no distension.       Palpations: Abdomen is soft.   Musculoskeletal:      Right lower leg: No edema.      Left lower leg: No edema.   Neurological:      Mental Status: He is alert.   Psychiatric:         Behavior: Behavior normal.         Relevant Results               Assessment/Plan          Principal Problem:    Acute renal failure, unspecified acute renal failure type (CMS-HCC)    Acute renal failure, improving  Metabolic acidosis, improving  Nephrology following  Continue PO bicarb  Per nephrology, may need renal biopsy if kidney function does not improve  Aldosterone, renin, and plasma metanephrines ordered, follow up results  Aspirin held in case patient needs renal biopsy      Hypertensive emergency, improving  Cardiology following  Continue amlodipine, carvedilol, and hydralazine  Avoid ACE/ARB due to renal failure     Chronic diastolic and systolic heart failure  Echo showing EF 40-45%   Management per cardiology     Elevated liver enzymes, improving  Hx of Hep C  GI following - predominantly hepatocellular pattern of injury  Patient reports being treated for Hep C, follow up Hep C RNA  RUQ US showing fatty liver     Tobacco use  Continue nicotine patch  Recommend cessation     Hypokalemia  Replace PRN     Generalized weakness/debility  PT/OT - moderate intensity      Plan:  Continue antihypertensives per cardiology  Renal function slowly improving, workup still pending, continue to hold aspirin in case renal biopsy is needed       Addendum 1355: called to bedside by RN for concerns of mental status change. On arrival, patient was seen laying in bed. Awake and alert. Vitals 123/81, HR 84, RR 17, Temp 36.9, blood sugar 118. Patient is able to follow commands but is not answering questions. Unclear if patient is unable to speak or if he is refusing to speak. Appears that patient is understanding of when a question is asked but he does not respond with actual words. Patient is able to move all extremities spontaneously. No focal  neurologic deficits noted but exam is limited due to patient participation. Unclear etiology of acute metabolic encephalopathy. Will check STAT CT head and ammonia level. If CT head and ammonia level are wnl, will consult neurology and order MRI.     Addendum 1506: CT head completed, awaiting final read but no clear evidence of acute intracranial abnormalities on my interpretation. Patient is now speaking but does seem confused. Able to state that he is in the hospital but is unable to state the year. SBP in the 130s. Suspect mental status change may be due to acute drop in blood pressure as he was 102/60 earlier this AM. Given this, will allow for some permissive hypertension tonight with goal SBP of 140s-130s. Hold parameters in place on BP meds. Hold off on MRI and neurology consult for now as there are no focal neurologic deficits on exam.              Kayla Boyd MD

## 2024-08-13 NOTE — PROGRESS NOTES
08/13/24 1427   Discharge Planning   Expected Discharge Disposition SNF   Does the patient need discharge transport arranged? Yes   RoundTrip coordination needed? Yes   Patient Choice   Provider Choice list and CMS website (https://medicare.gov/care-compare#search) for post-acute Quality and Resource Measure Data were provided and reviewed with: Patient   Patient / Family choosing to utilize agency / facility established prior to hospitalization No     TCC spoke to patient, updated regarding therapy recommendations. Patient agreeable to SNF at this time. List provide to review and make choices. TCC will follow for discharge planning.

## 2024-08-13 NOTE — PROGRESS NOTES
Spiritual Care Visit    Clinical Encounter Type  Visited With: Patient  Routine Visit: Introduction     Spiritual care Note:     Patient received a visit from the Spiritual care volunteer while admitted.  This patient was triaged for their emotional and spiritual need consistent with their belief system and supported accordingly. Patient was non verbal and the  offered supportive presence and prayer for patient.

## 2024-08-13 NOTE — PROGRESS NOTES
Occupational Therapy    Occupational Therapy Treatment    Name: Estuardo El  MRN: [id number]  : 1950  Date: 24  Time Calculation  Start Time: 938  Stop Time: 100  Time Calculation (min): 23 min    Assessment:  OT Assessment: Pt with gradual progress towards functional goals this date, increased lethargy, fatigue, and impaired cognition this date. Required 2 person assist for transfers. Pt would benefit from acute OT services to facilitate return to [facility].  Prognosis: Good  Barriers to Discharge: Decreased caregiver support  Evaluation/Treatment Tolerance: Patient limited by fatigue  Medical Staff Made Aware: Yes  End of Session Communication: Bedside nurse  End of Session Patient Position:  (seated EOB handoff to PT who was completing treatment)  Plan:  Treatment Interventions: ADL retraining, Functional transfer training, UE strengthening/ROM, Endurance training, Cognitive reorientation, Patient/family training, Equipment evaluation/education, Compensatory technique education  OT Frequency: 3 times per week  OT Discharge Recommendations: Moderate intensity level of continued care  Equipment Recommended upon Discharge: Wheeled walker  OT Recommended Transfer Status: Moderate assist, Assist of 2  OT - OK to Discharge: Yes    Subjective   Previous Visit Info:  OT Last Visit  OT Received On: 24  General:  General  Reason for Referral: activities of daily living, 74 year old male admit from home with weakness, fatigue, SOB, cough, chest pain, and poor oral intake  Past Medical History Relevant to Rehab: remote ETOH, hep C, CHF, HTN, back surgery  Family/Caregiver Present: No  Co-Treatment: PT  Co-Treatment Reason: Partial co-tx for mobility only due to limited tolerance to activity and need for 2nd skilled person for therapeutic handling during functional mobility to optimize outcomes / safety  Prior to Session Communication: Bedside nurse  Patient Position Received: Bed, 3 rail up, Alarm  on  General Comment: Pt cleared by nursing, agreeable to treatment  Precautions:  Hearing/Visual Limitations: WFL  Medical Precautions: Fall precautions  Vitals:  Vital Signs  Heart Rate: 75  Heart Rate Source: Monitor  Pain Assessment:  Pain Assessment  Pain Assessment: 0-10  0-10 (Numeric) Pain Score: 0 - No pain     Objective   Cognition:  Overall Cognitive Status: Impaired (moderately lethargic)  Orientation Level: Disoriented to place, Disoriented to time, Disoriented to situation  Following Commands: Follows one step commands with repetition  Memory:  (poor recall)  Safety/Judgement:  (mild)  Insight: Mild  Activities of Daily Living:      Grooming  Grooming Level of Assistance: Setup  Grooming Where Assessed: Edge of bed  Grooming Comments: Pt washed face seated EOB, declined oral hygiene and hair combing    UE Dressing  UE Dressing Level of Assistance: Maximum assistance  UE Dressing Where Assessed: Edge of bed  UE Dressing Comments: pt naked on arrival, assist to thread B UEs and pull around back    LE Dressing  LE Dressing: Yes  Sock Level of Assistance: Maximum assistance  LE Dressing Where Assessed: Edge of bed  LE Dressing Comments: donning socks    Toileting  Toileting Level of Assistance: Maximum assistance  Where Assessed: Bed level  Toileting Comments: Pt incontinent of urine    Bed Mobility/Transfers: Bed Mobility  Bed Mobility: Yes  Bed Mobility 1  Bed Mobility 1: Supine to sitting  Level of Assistance 1: Maximum assistance  Bed Mobility Comments 1: pt lying across bed on arrival with LEs off edge, pt required max A for trunk up and scoot to EOB. Pt reporting significant fatigue and lethargy.    Transfers  Transfer: Yes  Transfer 1  Transfer From 1: Sit to  Transfer to 1: Stand  Technique 1: Sit to stand  Transfer Device 1: Walker  Transfer Level of Assistance 1: Moderate assistance, +2  Trials/Comments 1: Bed elevated, cues for safe hand placement and RW management, pt required mod A x2 for sit  to stand from EOB with cues for transfer technique.  Transfers 2  Transfer From 2: Stand to  Transfer to 2: Bed  Technique 2: Stand to sit  Transfer Device 2: Walker  Transfer Level of Assistance 2: Moderate assistance, +2  Trials/Comments 2: Mod A x2 to control descent to EOB with cues for safe hand placement      Functional Mobility:  Functional Mobility  Functional Mobility Performed: Yes  Functional Mobility 1  Surface 1: Level tile  Device 1: Rolling walker  Assistance 1: Moderate assistance (x2)  Comments 1: Pt performed functional mobility short household distance at EOB required mod A x2 for balance and maintaining upright posture. Returned to EOB due to fatigue  Sitting Balance:  Static Sitting Balance  Static Sitting-Balance Support: Bilateral upper extremity supported, Feet supported  Static Sitting-Level of Assistance: Contact guard  Static Sitting-Comment/Number of Minutes: Pt tolerated sitting EOB x15 minutes for ADLs and ther ex with CGA    Therapy/Activity: Therapeutic Exercise  Therapeutic Exercise Performed: Yes (required significantly extended time for all exercises to complete each rep with max VCs and visual prompts throughout.)  Therapeutic Exercise Activity 1: JOANNE elbow flexion x5  Therapeutic Exercise Activity 2: JOANNE shoulder flexion x5  Therapeutic Exercise Activity 3: JOANNE tricep extension x5  Therapeutic Exercise Activity 4: JOANNE hand flexion x5    Outcome Measures:  [facility] Daily Activity  Putting on and taking off regular lower body clothing: A lot  Bathing (including washing, rinsing, drying): A lot  Putting on and taking off regular upper body clothing: A lot  Toileting, which includes using toilet, bedpan or urinal: A lot  Taking care of personal grooming such as brushing teeth: A lot  Eating Meals: A little  Daily Activity - Total Score: 13        Education Documentation  Precautions, taught by Beena Tavarez OT at 8/13/2024 12:03 PM.  Learner: Patient  Readiness: Acceptance  Method:  Explanation  Response: Verbalizes Understanding, Needs Reinforcement    ADL Training, taught by Beena Tavarez OT at 8/13/2024 12:03 PM.  Learner: Patient  Readiness: Acceptance  Method: Explanation  Response: Verbalizes Understanding, Needs Reinforcement    Education Comments  Pt educated on occupational therapy plan of care, safety/fall precautions, call light use.         Goals:  Encounter Problems       Encounter Problems (Active)       OT Goals       ADLs (Progressing)       Start:  08/12/24    Expected End:  08/30/24       Pt will complete ADL tasks with Mod I, using AE as needed, in order to complete self-care tasks.           Functional transfers (Progressing)       Start:  08/12/24    Expected End:  08/30/24            Functional mobility (Progressing)       Start:  08/12/24    Expected End:  08/30/24       Pt will perform functional mobility household distance at mod ind level with RW

## 2024-08-13 NOTE — CARE PLAN
Problem: Mobility  Goal: Bed mobility including supine to sit and sit to supine with min assist.  Outcome: Progressing  Goal: Transfers including sit to stand and stand to sit with min assist.  Outcome: Progressing  Goal: Ambulate 50 feet with rolling walker and min assist.  Outcome: Progressing

## 2024-08-13 NOTE — NURSING NOTE
Assumed care of pt, pt awake no complaint of pain , meds given by student and instructor, bed low and locked call light within reach , pt seems a little confused on what the heart monitor is and keeps taking it off, pt was re oriented

## 2024-08-13 NOTE — PROGRESS NOTES
"Estuardo El is a 74 y.o. male on day 2 of admission presenting with Acute renal failure, unspecified acute renal failure type (CMS-HCC).    Subjective   Alert and oriented, denies complaints chest pain or pressure, palpitations or feeling rapid heart rate, patient states he has been confused over the past few days and has a poor recall of his medical treatment over the past 2 days.       Objective     Physical Exam  Vitals and nursing note reviewed.   Constitutional:       Appearance: He is ill-appearing.   HENT:      Head: Normocephalic and atraumatic.      Nose: Nose normal.      Mouth/Throat:      Mouth: Mucous membranes are moist.      Pharynx: Oropharynx is clear.   Cardiovascular:      Rate and Rhythm: Normal rate and regular rhythm.      Pulses: Normal pulses.      Heart sounds: Normal heart sounds.   Pulmonary:      Effort: Pulmonary effort is normal.      Breath sounds: Wheezing present.   Abdominal:      General: Bowel sounds are normal.      Palpations: Abdomen is soft.   Musculoskeletal:         General: Normal range of motion.      Cervical back: Normal range of motion.      Right lower leg: No edema.      Left lower leg: No edema.   Skin:     General: Skin is warm and dry.      Capillary Refill: Capillary refill takes less than 2 seconds.   Neurological:      Mental Status: He is alert and oriented to person, place, and time.   Psychiatric:         Mood and Affect: Mood normal.         Behavior: Behavior normal.         Last Recorded Vitals  Blood pressure (!) 143/107, pulse 73, temperature 36.1 °C (97 °F), temperature source Temporal, resp. rate 16, height 1.778 m (5' 10\"), weight 67.6 kg (149 lb 0.5 oz), SpO2 95%.  Intake/Output last 3 Shifts:  I/O last 3 completed shifts:  In: 480 (7.1 mL/kg) [P.O.:480]  Out: 400 (5.9 mL/kg) [Urine:400 (0.2 mL/kg/hr)]  Weight: 67.6 kg     Relevant Results  Results for orders placed or performed during the hospital encounter of 08/11/24 (from the past 24 hour(s)) "   Transthoracic Echo (TTE) Complete   Result Value Ref Range    AV pk susanna 1.19 m/s    LVOT diam 2.03 cm    AV mn grad 3.1 mmHg    MV E/A ratio 0.52     Tricuspid annular plane systolic excursion 2.1 cm    LV Biplane EF 37 %    LA vol index A/L 32.6 ml/m2    MV avg E/e' ratio 10.57     LV EF 43 %    RV free wall pk S' 11.17 cm/s    LV GLS -9.3 %    RVSP 27.8 mmHg    LVIDd 4.98 cm    AV pk grad 5.7 mmHg    Aortic Valve Area by Continuity of VTI 2.38 cm2    Aortic Valve Area by Continuity of Peak Velocity 2.75 cm2    LV A4C EF 40.0    C3 Complement   Result Value Ref Range    C3 Complement 70 (L) 87 - 200 mg/dL   C4 Complement   Result Value Ref Range    C4 Complement 18 10 - 50 mg/dL   Comprehensive Metabolic Panel   Result Value Ref Range    Glucose 88 65 - 99 mg/dL    Sodium 137 133 - 145 mmol/L    Potassium 3.4 3.4 - 5.1 mmol/L    Chloride 100 97 - 107 mmol/L    Bicarbonate 22 (L) 24 - 31 mmol/L    Urea Nitrogen 49 (H) 8 - 25 mg/dL    Creatinine 3.40 (H) 0.40 - 1.60 mg/dL    eGFR 18 (L) >60 mL/min/1.73m*2    Calcium 8.6 8.5 - 10.4 mg/dL    Albumin 3.5 3.5 - 5.0 g/dL    Alkaline Phosphatase 72 35 - 125 U/L    Total Protein 6.3 5.9 - 7.9 g/dL     (H) 5 - 40 U/L    Bilirubin, Total 1.0 0.1 - 1.2 mg/dL     (H) 5 - 40 U/L    Anion Gap 15 <=19 mmol/L   CBC   Result Value Ref Range    WBC 9.0 4.4 - 11.3 x10*3/uL    nRBC 0.9 (H) 0.0 - 0.0 /100 WBCs    RBC 3.93 (L) 4.50 - 5.90 x10*6/uL    Hemoglobin 13.0 (L) 13.5 - 17.5 g/dL    Hematocrit 38.7 (L) 41.0 - 52.0 %    MCV 99 80 - 100 fL    MCH 33.1 26.0 - 34.0 pg    MCHC 33.6 32.0 - 36.0 g/dL    RDW 15.7 (H) 11.5 - 14.5 %    Platelets 129 (L) 150 - 450 x10*3/uL   Transthoracic Echo (TTE) Complete    Result Date: 8/12/2024            [facility] [address] , [place] [address]             Phone [phone] TRANSTHORACIC ECHOCARDIOGRAM REPORT Patient Name:      SAURABH FLORES         Reading Physician:    [id number] Meng                                                                 Percy LAROSE Study Date:        8/12/2024             Ordering Provider:    [id number] FLORENTINOANKITA VALENTINE MRN/PID:           [id number]              Fellow: Accession#:        [id number]          Nurse: Date of Birth/Age: 1950 / 74 years  Sonographer:          Raquel Celestin Gender:            M                     Additional Staff: Height:            177.80 cm             Admit Date: Weight:            92.08 kg              Admission Status:     Inpatient -                                                                Routine BSA / BMI:         2.10 m2 / 29.13 kg/m2 Department Location:  [facility] Blood Pressure: 138 /118 mmHg Study Type:    TRANSTHORACIC ECHO (TTE) COMPLETE Diagnosis/ICD: Acute on chronic diastolic (congestive) heart failure                (CHF)-I50.33 CPT Codes:     Echo Complete w Full [id number] Patient History: Pertinent History: CHF and HTN. Study Detail: The following Echo studies were performed: 2D, Doppler, color               flow, Strain and 3D. Definity used as a contrast agent for               endocardial border definition. Total contrast used for this               procedure was 1 mL via IV push.  PHYSICIAN INTERPRETATION: Left Ventricle: Left ventricular ejection fraction is mildly decreased, by visual estimate at 40-45%. There is global hypokinesis of the left ventricle with minor regional variations. The left ventricular cavity size is normal. Left Ventricular Global Longitudinal Strain - -9.3 %. Spectral Doppler shows an impaired relaxation pattern of left ventricular diastolic filling. Left Atrium: The left atrium is normal in size. Right Ventricle: The right ventricle is normal in size. There is low normal right ventricular systolic function. Right Atrium: The right atrium is mildly dilated. Aortic Valve: The aortic valve is trileaflet. The aortic valve dimensionless index  is 0.74. There is no evidence of aortic valve regurgitation. The peak instantaneous gradient of the aortic valve is 5.7 mmHg. The mean gradient of the aortic valve is 3.1 mmHg. Mitral Valve: The mitral valve is mildly thickened. There is trace mitral valve regurgitation. Tricuspid Valve: The tricuspid valve is structurally normal. There is trace tricuspid regurgitation. Pulmonic Valve: The pulmonic valve is structurally normal. There is no indication of pulmonic valve regurgitation. Pericardium: There is no pericardial effusion noted. Aorta: The aortic root is normal.  CONCLUSIONS:  1. Left ventricular ejection fraction is mildly decreased, by visual estimate at 40-45%.  2. Spectral Doppler shows an impaired relaxation pattern of left ventricular diastolic filling.  3. There is low normal right ventricular systolic function. QUANTITATIVE DATA SUMMARY: 2D MEASUREMENTS:                           Normal Ranges: IVSd:          1.38 cm    (0.6-1.1cm) LVPWd:         0.88 cm    (0.6-1.1cm) LVIDd:         4.98 cm    (3.9-5.9cm) LVIDs:         4.34 cm LV Mass Index: 101.3 g/m2 LV % FS        12.8 % LA VOLUME:                              Normal Ranges: LA Vol A4C:       58.1 ml    (22+/-6mL/m2) LA Vol A2C:       74.1 ml LA Vol BP:        68.5 ml LA Vol Index A4C: 27.7 ml/m2 LA Vol Index A2C: 35.3 ml/m2 LA Vol Index BP:  32.6 ml/m2 LA Volume Index:  32.6 ml/m2 LA Vol A4C:       53.3 ml LA Vol A2C:       69.6 ml LA Vol Index BSA: 29.3 ml/m2 RA VOLUME BY A/L METHOD:                       Normal Ranges: RA Area A4C: 22.3 cm2 AORTA MEASUREMENTS:                      Normal Ranges: Ao Sinus, d: 3.10 cm (2.1-3.5cm) Ao STJ, d:   2.30 cm (1.7-3.4cm) Asc Ao, d:   3.20 cm (2.1-3.4cm) LV SYSTOLIC FUNCTION BY 2D PLANIMETRY (MOD):                                        Normal Ranges: EF-A4C View:                      40 % (>=55%) EF-A2C View:                      40 % EF-Biplane:                       37 % EF-Visual:                         43 % EF-3DQ:                           35 % EF-Auto:                          35 % LV EF Reported:                   43 % Global Longitudinal Strain (GLS): -9 % LV DIASTOLIC FUNCTION:                         Normal Ranges: MV Peak E:    0.49 m/s  (0.7-1.2 m/s) MV Peak A:    0.93 m/s  (0.42-0.7 m/s) E/A Ratio:    0.52      (1.0-2.2) MV e'         0.047 m/s (>8.0) MV lateral e' 0.06 m/s MV medial e'  0.04 m/s E/e' Ratio:   10.27     (<8.0) MITRAL VALVE:                 Normal Ranges: MV DT: 227 msec (150-240msec) AORTIC VALVE:                                   Normal Ranges: AoV Vmax:                1.19 m/s (<=1.7m/s) AoV Peak P.7 mmHg (<20mmHg) AoV Mean PG:             3.1 mmHg (1.7-11.5mmHg) LVOT Max Ady:            1.02 m/s (<=1.1m/s) AoV VTI:                 19.50 cm (18-25cm) LVOT VTI:                14.35 cm LVOT Diameter:           2.03 cm  (1.8-2.4cm) AoV Area, VTI:           2.38 cm2 (2.5-5.5cm2) AoV Area,Vmax:           2.75 cm2 (2.5-4.5cm2) AoV Dimensionless Index: 0.74  RIGHT VENTRICLE: RV Basal 4.01 cm TAPSE:   20.9 mm RV s'    0.11 m/s TRICUSPID VALVE/RVSP:                             Normal Ranges: Peak TR Velocity: 2.49 m/s RV Syst Pressure: 27.8 mmHg (< 30mmHg) IVC Diam:         1.68 cm AORTA: Asc Ao Diam 3.21 cm  [id number] Meng Greer DO Electronically signed on 2024 at 1:25:24 PM  ** Final **          Assessment/Plan   Principal Problem:    Acute renal failure, unspecified acute renal failure type (CMS-HCC)    Hypertensive emergency  Acute kidney injury  Elevated high sensitive troponin  Generalized weakness  Tobacco use  Chronic diastolic heart failure     Overall impression:     : Consulted for congestive heart failure and NSTEMI.  The patient's symptoms are generalized weakness and fatigue.  Reports no chest pain and some mild shortness of breath/fatigue over the past few weeks.  Chest x-ray is clear.  There is no evidence of fluid volume overload on auscultation.  There  is no JVD.  There is no peripheral edema.  Despite this his proBNP is 70,000.  Additionally he has noted to have an acute kidney injury with a creatinine of 4.1 which is new for this patient.  Upon further review the patient has severely hypertensive with a systolic averaging over 200.  There is a history of hypertension, however the patient does not take medications in the outpatient setting.  It appears the patient is having a hypertensive emergency resulting in elevated proBNP and elevated high-sensitivity troponin; and perhaps hypertensive cardiomyopathy.  Certainly there is also suspicion given the elevated blood pressure of renal artery stenosis.  Will check echocardiogram.  Renal artery stenosis study has been ordered.  At this point given lack of chest pain symptoms we will stop heparin drip.  Agree with amlodipine as first agent.  Would avoid ACEs and ARB's.  Agree with carvedilol.  I have added hydralazine for further blood pressure management.  Patient is significantly decompensated from a cardiac perspective given his hypertensive emergency, and a high risk for further decompensation.  Will follow with you.      8/12: Improved over the past 24 hours.  Patient states he feels significantly improved less anxiety and no longer has a feeling that he is dying.  On telemetry monitor remained in a sinus rhythm with occasional PVCs.  His blood pressure significantly improved with most recent of 138/118.  Creatinine is slowly improving with most recent at 3.9.  Hemoglobin stable at 13.0 he has mild peripheral edema this morning, however his lungs are clear on auscultation.  He is breathing constantly on room air.  He is chest pain-free.  He will go for an echocardiogram this morning which will further define his cardiac status.  At this point he continues on a new combination of amlodipine, carvedilol, and hydralazine.  At this point we continue to avoid ACE or ARB's secondary to acute kidney injury.  Nephrology  is following.  They note the patient may require a renal biopsy.  His aspirin has been placed on hold for this.  Overall is improving.  Will follow with you.    8/13: Continues to improve.  Blood pressure improved with most recent of 143/107.  Creatinine significantly improved down to 3.4.  Hemoglobin stable at 13.0.  Echocardiogram yesterday confirms previous diagnosis of diastolic dysfunction but reveals a new reduced ejection fraction of 40 to 45%.  This however is a global hypokinesis and does not reveal significant wall motion abnormalities.  Patient does continue on carvedilol as well as amlodipine and hydralazine, however we are unable to initiate a ACE or an ARB at this time.  Believe the patient would benefit at some time from a nuclear stress test, however this does not need to be completed during this hospitalization.  At this point we will continue to focus on blood pressure management as well as improving kidney function which does continue to significantly improved.  The patient did note to me this morning that he has poor recall of our discussions yesterday and the day before that.  He does appear significantly fatigued on assessment today.  Overall improving.  Euvolemic on exam today.  Chest pain-free.  Will follow with you.      I spent 35 minutes in the professional and overall care of this patient.      Juan J Gaines, APRN-CNP

## 2024-08-13 NOTE — PROGRESS NOTES
"Estuardo Flores is a 74 y.o. male on day 2 of admission presenting with Acute renal failure, unspecified acute renal failure type (CMS-HCC).    Subjective   Patient denies any abdominal pain, nausea, vomiting.        Objective     Physical Exam  HENT:      Head: Normocephalic.      Nose: Nose normal.      Mouth/Throat:      Mouth: Mucous membranes are moist.   Eyes:      Pupils: Pupils are equal, round, and reactive to light.   Cardiovascular:      Rate and Rhythm: Normal rate.   Pulmonary:      Effort: Pulmonary effort is normal.   Abdominal:      Palpations: Abdomen is soft.   Musculoskeletal:         General: Normal range of motion.      Cervical back: Normal range of motion.   Skin:     General: Skin is warm.   Neurological:      General: No focal deficit present.      Mental Status: He is alert.   Psychiatric:         Mood and Affect: Mood normal.         Last Recorded Vitals  Blood pressure (!) 143/107, pulse 73, temperature 36.1 °C (97 °F), temperature source Temporal, resp. rate 16, height 1.778 m (5' 10\"), weight 67.6 kg (149 lb 0.5 oz), SpO2 95%.  Intake/Output last 3 Shifts:  I/O last 3 completed shifts:  In: 480 (7.1 mL/kg) [P.O.:480]  Out: 400 (5.9 mL/kg) [Urine:400 (0.2 mL/kg/hr)]  Weight: 67.6 kg     Relevant Results  Transthoracic Echo (TTE) Complete    Result Date: 8/12/2024            [place] [id number]             Phone [phone] TRANSTHORACIC ECHOCARDIOGRAM REPORT Patient Name:      ESTUARDO FLORES         Reading Physician:    [id number] Greer DO Study Date:        8/12/2024             Ordering Provider:    [id number] FLORENTINO VALENTINE MRN/PID:           [id number]              Fellow: Accession#:        [id number]          Nurse: Date of Birth/Age: 1950 / 74 years  Sonographer:          Raquel Celestin Gender:            " M                     Additional Staff: Height:            177.80 cm             Admit Date: Weight:            92.08 kg              Admission Status:     Inpatient -                                                                Routine BSA / BMI:         2.10 m2 / 29.13 kg/m2 Department Location:  [facility] Blood Pressure: 138 /118 mmHg Study Type:    TRANSTHORACIC ECHO (TTE) COMPLETE Diagnosis/ICD: Acute on chronic diastolic (congestive) heart failure                (CHF)-I50.33 CPT Codes:     Echo Complete w Full Doppler-02123 Patient History: Pertinent History: CHF and HTN. Study Detail: The following Echo studies were performed: 2D, Doppler, color               flow, Strain and 3D. Definity used as a contrast agent for               endocardial border definition. Total contrast used for this               procedure was 1 mL via IV push.  PHYSICIAN INTERPRETATION: Left Ventricle: Left ventricular ejection fraction is mildly decreased, by visual estimate at 40-45%. There is global hypokinesis of the left ventricle with minor regional variations. The left ventricular cavity size is normal. Left Ventricular Global Longitudinal Strain - -9.3 %. Spectral Doppler shows an impaired relaxation pattern of left ventricular diastolic filling. Left Atrium: The left atrium is normal in size. Right Ventricle: The right ventricle is normal in size. There is low normal right ventricular systolic function. Right Atrium: The right atrium is mildly dilated. Aortic Valve: The aortic valve is trileaflet. The aortic valve dimensionless index is 0.74. There is no evidence of aortic valve regurgitation. The peak instantaneous gradient of the aortic valve is 5.7 mmHg. The mean gradient of the aortic valve is 3.1 mmHg. Mitral Valve: The mitral valve is mildly thickened. There is trace mitral valve regurgitation. Tricuspid Valve: The tricuspid valve is structurally normal. There is trace tricuspid regurgitation. Pulmonic Valve: The  pulmonic valve is structurally normal. There is no indication of pulmonic valve regurgitation. Pericardium: There is no pericardial effusion noted. Aorta: The aortic root is normal.  CONCLUSIONS:  1. Left ventricular ejection fraction is mildly decreased, by visual estimate at 40-45%.  2. Spectral Doppler shows an impaired relaxation pattern of left ventricular diastolic filling.  3. There is low normal right ventricular systolic function. QUANTITATIVE DATA SUMMARY: 2D MEASUREMENTS:                           Normal Ranges: IVSd:          1.38 cm    (0.6-1.1cm) LVPWd:         0.88 cm    (0.6-1.1cm) LVIDd:         4.98 cm    (3.9-5.9cm) LVIDs:         4.34 cm LV Mass Index: 101.3 g/m2 LV % FS        12.8 % LA VOLUME:                              Normal Ranges: LA Vol A4C:       58.1 ml    (22+/-6mL/m2) LA Vol A2C:       74.1 ml LA Vol BP:        68.5 ml LA Vol Index A4C: 27.7 ml/m2 LA Vol Index A2C: 35.3 ml/m2 LA Vol Index BP:  32.6 ml/m2 LA Volume Index:  32.6 ml/m2 LA Vol A4C:       53.3 ml LA Vol A2C:       69.6 ml LA Vol Index BSA: 29.3 ml/m2 RA VOLUME BY A/L METHOD:                       Normal Ranges: RA Area A4C: 22.3 cm2 AORTA MEASUREMENTS:                      Normal Ranges: Ao Sinus, d: 3.10 cm (2.1-3.5cm) Ao STJ, d:   2.30 cm (1.7-3.4cm) Asc Ao, d:   3.20 cm (2.1-3.4cm) LV SYSTOLIC FUNCTION BY 2D PLANIMETRY (MOD):                                        Normal Ranges: EF-A4C View:                      40 % (>=55%) EF-A2C View:                      40 % EF-Biplane:                       37 % EF-Visual:                        43 % EF-3DQ:                           35 % EF-Auto:                          35 % LV EF Reported:                   43 % Global Longitudinal Strain (GLS): -9 % LV DIASTOLIC FUNCTION:                         Normal Ranges: MV Peak E:    0.49 m/s  (0.7-1.2 m/s) MV Peak A:    0.93 m/s  (0.42-0.7 m/s) E/A Ratio:    0.52      (1.0-2.2) MV e'         0.047 m/s (>8.0) MV lateral e' 0.06 m/s MV  medial e'  0.04 m/s E/e' Ratio:   10.27     (<8.0) MITRAL VALVE:                 Normal Ranges: MV DT: 227 msec (150-240msec) AORTIC VALVE:                                   Normal Ranges: AoV Vmax:                1.19 m/s (<=1.7m/s) AoV Peak P.7 mmHg (<20mmHg) AoV Mean PG:             3.1 mmHg (1.7-11.5mmHg) LVOT Max Ady:            1.02 m/s (<=1.1m/s) AoV VTI:                 19.50 cm (18-25cm) LVOT VTI:                14.35 cm LVOT Diameter:           2.03 cm  (1.8-2.4cm) AoV Area, VTI:           2.38 cm2 (2.5-5.5cm2) AoV Area,Vmax:           2.75 cm2 (2.5-4.5cm2) AoV Dimensionless Index: 0.74  RIGHT VENTRICLE: RV Basal 4.01 cm TAPSE:   20.9 mm RV s'    0.11 m/s TRICUSPID VALVE/RVSP:                             Normal Ranges: Peak TR Velocity: 2.49 m/s RV Syst Pressure: 27.8 mmHg (< 30mmHg) IVC Diam:         1.68 cm AORTA: Asc Ao Diam 3.21 cm  [id number] Meng Greer DO Electronically signed on 2024 at 1:25:24 PM  ** Final **     CT head wo IV contrast    Result Date: 2024  Interpreted By:  Uyen Aburto, STUDY: CT HEAD WO IV CONTRAST;  2024 1:26 pm   INDICATION: Signs/Symptoms:headaches, hypertensive urgency.   COMPARISON: 2020   ACCESSION NUMBER(S): [id number]   ORDERING CLINICIAN: GERBER ALTMAN   TECHNIQUE: Unenhanced CT images of the head were obtained.   FINDINGS: The ventricles, cisterns and sulci are prominent, consistent with diffuse volume loss. There are areas of nonspecific white matter hypodensity, which are probably age related or microvascular in nature. These findings are similar to the previous exam. There is no acute intracranial hemorrhage, mass effect or midline shift. No extraaxial fluid collection.   No focal calvarial lesion.   Visualized paranasal sinuses are clear.       No acute intracranial hemorrhage or mass-effect.   MACRO: None.   Signed by: Uyen Aburto 2024 1:36 PM Dictation workstation:   [id number]     renal complete    Result Date:  8/11/2024  Interpreted By:  Paul Lerma, STUDY: US RENAL COMPLETE; 8/11/2024 12:52 pm   INDICATION: Signs/Symptoms:SUZANNE.   COMPARISON: None.   ACCESSION NUMBER(S): [id number]   ORDERING CLINICIAN: FLORENTINO VALENTINE   FINDINGS: The right kidney measures 10.5 cm. There is normal cortical thickness. No hydronephrosis or nephrolithiasis demonstrated. Of note, there is a 1.2 cm cyst in the midpole of the right kidney with posterior acoustic enhancement. Also, in the inferior pole there is a 1.4 cm cyst.   The left kidney measures 10.7 cm. There are a few adjacent cysts within the inferior pole of the left kidney largest measuring 3.8 cm. Also, there is a adjacent septated cyst in the inferior pole measuring 2.7 cm with posterior acoustic enhancement. No asymmetric thickening septa.   Bladder demonstrates bilateral ureteral jets. No intraluminal filling defect visualized portions of the bladder.           1. Cysts left kidney with a complex cyst in the inferior pole of the left kidney demonstrating a thin internal septation. No nodularity or asymmetric thickened septa   2. No hydronephrosis   MACRO: None.   Signed by: Paul Lerma 8/11/2024 12:57 PM Dictation workstation:   [id number]    US right upper quadrant    Result Date: 8/11/2024  Interpreted By:  Schoenberger, Joseph, STUDY: US RIGHT UPPER QUADRANT;  8/11/2024 12:46 pm   INDICATION: Signs/Symptoms:abnormal LFT's.   COMPARISON: None.   ACCESSION NUMBER(S): [id number]   ORDERING CLINICIAN: FLORENTINO VALENTINE   TECHNIQUE: Multiple images of the right upper quadrant were obtained.   FINDINGS: LIVER: The liver measures 15.5 cm  and is diffusely echogenic in appearance, consistent with diffuse fatty infiltration. The resulting increased beam attenuation thereby limiting evaluation of the liver for focal lesions. Within the limitations, no focal lesions are seen.     GALLBLADDER: The gallbladder is nondistended, and demonstrates no evidence of gallstones, wall  thickening or surrounding fluid. The gallbladder wall thickness is 4.1 mm. Sonographic Whipple's sign is negative.     BILE DUCTS: No evidence of intra or extrahepatic biliary dilatation is identified; the common bile duct measures 1.8 mm .   PANCREAS: The pancreas is poorly visualized due to overlying bowel gas.   RIGHT KIDNEY: The right kidney measures 9.9 cm  in length. The renal cortical thickness is is within normal limit.  No hydronephrosis or renal calculi are seen. The renal cortex is somewhat hyperechoic.       Hepatic steatosis/steatohepatitis.   Chronic medical renal disease.   MACRO: None   Signed by: Joseph Schoenberger 8/11/2024 12:50 PM Dictation workstation:   [id number]    Vascular US lower extremity venous duplex bilateral    Result Date: 8/11/2024  Interpreted By:  Schoenberger, Joseph, STUDY: [facility] US LOWER EXTREMITY VENOUS DUPLEX BILATERAL;  8/11/2024 12:46 pm   INDICATION: Signs/Symptoms:Swelling and elevated dimer.   COMPARISON: None.   ACCESSION NUMBER(S): [id number]   ORDERING CLINICIAN: FLORENTINO VALENTINE   TECHNIQUE: Vascular ultrasound of the bilateral lower extremities was performed. Real-time compression views as well as Gray scale, color Doppler and spectral Doppler waveform analysis was performed.   FINDINGS: Evaluation of the visualized portions of the bilateral common femoral vein, proximal, mid, and distal femoral vein, and popliteal vein were performed.  Evaluation of the visualized portions of the  posterior tibial and peroneal veins were also performed.   Limitations:  None   The evaluated veins demonstrate normal compressibility. There is intact venous flow demonstrating normal respiratory variability and normal augmentation of flow with calf compression. Therefore, there is no ultrasonographic evidence for deep vein thrombosis within the evaluated veins.   Respiratory variation and augmentation to calf pressure was noted.       No sonographic findings suggestive of right or  left lower extremity deep venous thrombosis.   MACRO: None   Signed by: Joseph Schoenberger 8/11/2024 12:48 PM Dictation workstation:   [id number]      Scheduled medications  amLODIPine, 10 mg, oral, Daily  [Held by provider] aspirin, 81 mg, oral, Daily  carvedilol, 12.5 mg, oral, BID  hydrALAZINE, 50 mg, oral, TID  nicotine, 1 patch, transdermal, Daily  perflutren protein A microsphere, 0.5 mL, intravenous, Once in imaging  polyethylene glycol, 17 g, oral, Daily  sodium bicarbonate, 650 mg, oral, BID  sulfur hexafluoride microsphr, 2 mL, intravenous, Once in imaging      Continuous medications     PRN medications  PRN medications: acetaminophen, heparin (porcine), ondansetron  Results for orders placed or performed during the hospital encounter of 08/11/24 (from the past 24 hour(s))   Transthoracic Echo (TTE) Complete   Result Value Ref Range    AV pk susanna 1.19 m/s    LVOT diam 2.03 cm    AV mn grad 3.1 mmHg    MV E/A ratio 0.52     Tricuspid annular plane systolic excursion 2.1 cm    LV Biplane EF 37 %    LA vol index A/L 32.6 ml/m2    MV avg E/e' ratio 10.57     LV EF 43 %    RV free wall pk S' 11.17 cm/s    LV GLS -9.3 %    RVSP 27.8 mmHg    LVIDd 4.98 cm    AV pk grad 5.7 mmHg    Aortic Valve Area by Continuity of VTI 2.38 cm2    Aortic Valve Area by Continuity of Peak Velocity 2.75 cm2    LV A4C EF 40.0    C3 Complement   Result Value Ref Range    C3 Complement 70 (L) 87 - 200 mg/dL   C4 Complement   Result Value Ref Range    C4 Complement 18 10 - 50 mg/dL   Comprehensive Metabolic Panel   Result Value Ref Range    Glucose 88 65 - 99 mg/dL    Sodium 137 133 - 145 mmol/L    Potassium 3.4 3.4 - 5.1 mmol/L    Chloride 100 97 - 107 mmol/L    Bicarbonate 22 (L) 24 - 31 mmol/L    Urea Nitrogen 49 (H) 8 - 25 mg/dL    Creatinine 3.40 (H) 0.40 - 1.60 mg/dL    eGFR 18 (L) >60 mL/min/1.73m*2    Calcium 8.6 8.5 - 10.4 mg/dL    Albumin 3.5 3.5 - 5.0 g/dL    Alkaline Phosphatase 72 35 - 125 U/L    Total Protein 6.3 5.9 -  7.9 g/dL     (H) 5 - 40 U/L    Bilirubin, Total 1.0 0.1 - 1.2 mg/dL     (H) 5 - 40 U/L    Anion Gap 15 <=19 mmol/L   CBC   Result Value Ref Range    WBC 9.0 4.4 - 11.3 x10*3/uL    nRBC 0.9 (H) 0.0 - 0.0 /100 WBCs    RBC 3.93 (L) 4.50 - 5.90 x10*6/uL    Hemoglobin 13.0 (L) 13.5 - 17.5 g/dL    Hematocrit 38.7 (L) 41.0 - 52.0 %    MCV 99 80 - 100 fL    MCH 33.1 26.0 - 34.0 pg    MCHC 33.6 32.0 - 36.0 g/dL    RDW 15.7 (H) 11.5 - 14.5 %    Platelets 129 (L) 150 - 450 x10*3/uL                            Assessment/Plan   Principal Problem:    Acute renal failure, unspecified acute renal failure type (CMS-HCC)    Elevated LFTs, NSTEMI               -Predominately hepatocellular. His BP is actually high. No syncope. Less likely shock liver. Less likely congestive. Denies current ETOH but he does have a prior history for many years. No listed home meds. Denies antibiotics Possible elevated CK/rhabdo contributing vs Tylenol use (he reports taking a lot of extra strength at home)    8/13   LFTs down trending. Acetaminophen negative. RUQ US fatty liver. Normal gallbladder. Hep C + antibody. He was treated, pending RNA by PCR. Continue to monitor CMP, INR, Continue supportive care.        Berna Cristobal, APRN-CNP

## 2024-08-13 NOTE — NURSING NOTE
Called Dr Boyd to room, bs take 118, bp 123/81 R 17 , HR 84, T 36.9 , Dr Boyd ordered stat CT head will monitor

## 2024-08-13 NOTE — PROGRESS NOTES
Estuardo El is a 74 y.o. male on day 2 of admission presenting with Acute renal failure, unspecified acute renal failure type (CMS-HCC).      Subjective   Patient very fatigued otherwise no specific complaints.  His creatinine is trending down to 3.4.       Objective          Vitals 24HR  Heart Rate:  [72-76]   Temp:  [36.1 °C (97 °F)-36.7 °C (98.1 °F)]   Resp:  [15-18]   BP: (102-156)/()   Weight:  [67.6 kg (149 lb 0.5 oz)]   SpO2:  [93 %-97 %]       Intake/Output last 3 Shifts:    Intake/Output Summary (Last 24 hours) at 8/13/2024 1151  Last data filed at 8/13/2024 1033  Gross per 24 hour   Intake 480 ml   Output 200 ml   Net 280 ml       Physical Exam  Constitutional:       General: He is awake. He is not in acute distress.  Cardiovascular:      No friction rub.   Pulmonary:      Effort: Pulmonary effort is normal.      Comments: Mildly diminished breath sounds  Abdominal:      General: Bowel sounds are normal.      Palpations: Abdomen is soft.      Tenderness: There is no guarding or rebound.   Musculoskeletal:      Comments: no edema, no cyanosis     Relevant Results  Results for orders placed or performed during the hospital encounter of 08/11/24 (from the past 24 hour(s))   C3 Complement   Result Value Ref Range    C3 Complement 70 (L) 87 - 200 mg/dL   C4 Complement   Result Value Ref Range    C4 Complement 18 10 - 50 mg/dL   Comprehensive Metabolic Panel   Result Value Ref Range    Glucose 88 65 - 99 mg/dL    Sodium 137 133 - 145 mmol/L    Potassium 3.4 3.4 - 5.1 mmol/L    Chloride 100 97 - 107 mmol/L    Bicarbonate 22 (L) 24 - 31 mmol/L    Urea Nitrogen 49 (H) 8 - 25 mg/dL    Creatinine 3.40 (H) 0.40 - 1.60 mg/dL    eGFR 18 (L) >60 mL/min/1.73m*2    Calcium 8.6 8.5 - 10.4 mg/dL    Albumin 3.5 3.5 - 5.0 g/dL    Alkaline Phosphatase 72 35 - 125 U/L    Total Protein 6.3 5.9 - 7.9 g/dL     (H) 5 - 40 U/L    Bilirubin, Total 1.0 0.1 - 1.2 mg/dL     (H) 5 - 40 U/L    Anion Gap 15 <=19 mmol/L    CBC   Result Value Ref Range    WBC 9.0 4.4 - 11.3 x10*3/uL    nRBC 0.9 (H) 0.0 - 0.0 /100 WBCs    RBC 3.93 (L) 4.50 - 5.90 x10*6/uL    Hemoglobin 13.0 (L) 13.5 - 17.5 g/dL    Hematocrit 38.7 (L) 41.0 - 52.0 %    MCV 99 80 - 100 fL    MCH 33.1 26.0 - 34.0 pg    MCHC 33.6 32.0 - 36.0 g/dL    RDW 15.7 (H) 11.5 - 14.5 %    Platelets 129 (L) 150 - 450 x10*3/uL   Protime-INR   Result Value Ref Range    Protime 12.8 (H) 9.3 - 12.7 seconds    INR 1.2 0.9 - 1.2            Assessment/Plan   Acute kidney injury, the differential diagnosis is broad given his complex medical history of congestive heart failure, hepatitis C, BPH, uncontrolled hypertension, however his renal function is improving  Hypertensive urgency, improving  Congestive heart failure  Hepatitis C  BPH  Metabolic acidosis  Elevated liver enzymes     Plan: The rest of the serologies, hepatitis C, cryoglobulins, urine protein creatinine ratio, ethylene glycol and methanol level are all pending however his renal function is improving. Noted the aspirin was held, will continue to monitor his renal function closely and monitor for any renal biopsy indications.  Monitor blood pressure closely.  Avoid diuretics or IV fluids at this time.  Pending renin, aldosterone, metanephrines.  Continue oral bicarbonate.  Cardiology and GI on board.  No dialysis needs, monitor closely. He will need outpatient follow-up for complex renal cyst with urology.    René Peralta MD

## 2024-08-13 NOTE — PROGRESS NOTES
Physical Therapy    Physical Therapy Treatment    Patient Name: Estuardo El  MRN: [id number]  Today's Date: 8/13/2024  Time Calculation  Start Time: 1001  Stop Time: 1015  Time Calculation (min): 14 min    Assessment/Plan   PT Assessment  Rehab Prognosis: Good  Evaluation/Treatment Tolerance: Patient limited by fatigue (intermittent lethargy)  End of Session Communication: Bedside nurse  Assessment Comment: Inttermittent lethargy limiting tolerance to activity and functional mobility this date;  fall risk  End of Session Patient Position: Bed, 3 rail up, Alarm on  PT Plan  Inpatient/Swing Bed or Outpatient: Inpatient  PT Plan  Treatment/Interventions: Bed mobility, Transfer training, Gait training, Balance training, Strengthening, Endurance training, Therapeutic exercise, Therapeutic activity  PT Plan: Ongoing PT  PT Frequency: 4 times per week  PT Discharge Recommendations: Moderate intensity level of continued care  PT Recommended Transfer Status: Assist x2  PT - OK to Discharge: Yes (with skilled physical therapy services at next level of care)      General Visit Information:   PT  Visit  PT Received On: 08/13/24  General  Co-Treatment: OT  Co-Treatment Reason: Partial co-tx due to limited tolerance to activity and need for 2nd skilled person for therapeutic handling during functional mobility to optimize outcomes / safety  Prior to Session Communication: Bedside nurse  Patient Position Received:  (Sitting on side of bed with occupational therapy)  General Comment: RN cleared patient for treatment.  Patient reports agreeable to treatment.    Subjective   Precautions:  Precautions  Medical Precautions: Fall precautions      Objective   Pain:  Pain Assessment  Pain Assessment: 0-10  0-10 (Numeric) Pain Score: 0 - No pain  Cognition:  Cognition  Overall Cognitive Status: Impaired (memory deficits;  intermittently lethargic)  Coordination:  Movements are Fluid and Coordinated: No  Lower Body Coordination: Slow rate  of movement tom LE  Postural Control:  Static Sitting Balance  Static Sitting-Balance Support: Bilateral upper extremity supported  Static Sitting-Level of Assistance: Moderate assistance, Minimum assistance  Static Sitting-Comment/Number of Minutes: Intermittent episodes of assist required to maintain midline while sitting on side of bed due to decreased balance posterior.  Static Standing Balance  Static Standing-Balance Support: Bilateral upper extremity supported  Static Standing-Level of Assistance: Moderate assistance (x 2)  Static Standing-Comment/Number of Minutes: Assist with trunk support and balance during static standing with rolling walker;  patient stands with forward flexed posture.          Activity Tolerance:  Activity Tolerance  Endurance: Decreased tolerance for upright activites  Activity Tolerance Comments: Fatigue and intermittent lethargy  Treatments:  Therapeutic Exercise  Therapeutic Exercise Performed: Yes  Therapeutic Exercise Activity 1: Ankle pumps, heel slides, hip abduction tom LE with assist 5 reps x 1 set    Therapeutic Activity  Therapeutic Activity Performed: Yes  Therapeutic Activity 1: 4 lateral sidesteps with rolling walker and assist with balance, trunk support, and walker positioning         Bed Mobility  Bed Mobility: Yes  Bed Mobility 1  Bed Mobility 1: Sitting to supine  Level of Assistance 1: Maximum assistance  Bed Mobility Comments 1: Assist with trunk and tom LE    Ambulation/Gait Training  Ambulation/Gait Training Performed: Yes  Ambulation/Gait Training 1  Surface 1: Level tile  Device 1: Rolling walker  Assistance 1: Moderate assistance (x 2)  Comments/Distance (ft) 1: 2 feet forward and reverse with assist for trunk support and balance;  patient stands with wide base of support;  patient appeared to support body weight with tom LE in standing;  patient required increased time and effort to weightshift and advance tom LE during mobility.  Transfers  Transfer:  Yes  Transfer 1  Transfer From 1: Sit to  Transfer to 1: Stand  Technique 1: Sit to stand  Transfer Device 1: Walker  Transfer Level of Assistance 1: Maximum assistance (x 2)  Trials/Comments 1: Assist with elevating buttocks from sitting surface and positioning COG over EDMUND during sit to stand;  patient was allowed to pull up from stabilized walker during sit to stand.  Transfers 2  Transfer From 2: Stand to  Transfer to 2: Sit  Technique 2: Stand to sit  Transfer Device 2: Walker  Transfer Level of Assistance 2: Moderate assistance (x 2)  Trials/Comments 2: Assist with trunk support and balance;  verbal cues for hand placement    Stairs  Stairs: No         Outcome Measures:  [facility] Basic Mobility  Turning from your back to your side while in a flat bed without using bedrails: A lot  Moving from lying on your back to sitting on the side of a flat bed without using bedrails: A lot  Moving to and from bed to chair (including a wheelchair): A lot  Standing up from a chair using your arms (e.g. wheelchair or bedside chair): A lot  To walk in hospital room: A lot  Climbing 3-5 steps with railing: Total  Basic Mobility - Total Score: 11    Education Documentation  No documentation found.  Education Comments  No comments found.        OP EDUCATION:       Encounter Problems       Encounter Problems (Active)       Mobility       Bed mobility including supine to sit and sit to supine with min assist. (Progressing)       Start:  08/12/24    Expected End:  08/26/24            Transfers including sit to stand and stand to sit with min assist. (Progressing)       Start:  08/12/24    Expected End:  08/26/24            Ambulate 50 feet with rolling walker and min assist. (Progressing)       Start:  08/12/24    Expected End:  08/26/24

## 2024-08-13 NOTE — CARE PLAN
The patient's goals for the shift include      The clinical goals for the shift include monitor for hypertension, remain free of falls    Problem: Skin  Goal: Decreased wound size/increased tissue granulation at next dressing change  Outcome: Progressing  Flowsheets (Taken 8/12/2024 2116)  Decreased wound size/increased tissue granulation at next dressing change:   Utilize specialty bed per algorithm   Promote sleep for wound healing   Protective dressings over bony prominences  Goal: Participates in plan/prevention/treatment measures  Outcome: Progressing  Flowsheets (Taken 8/12/2024 2116)  Participates in plan/prevention/treatment measures:   Discuss with provider PT/OT consult   Elevate heels   Increase activity/out of bed for meals  Goal: Prevent/manage excess moisture  Outcome: Progressing  Flowsheets (Taken 8/12/2024 2116)  Prevent/manage excess moisture:   Use wicking fabric (obtain order)   Follow provider orders for dressing changes  Goal: Prevent/minimize sheer/friction injuries  Outcome: Progressing  Flowsheets (Taken 8/12/2024 2116)  Prevent/minimize sheer/friction injuries:   Use pull sheet   HOB 30 degrees or less   Turn/reposition every 2 hours/use positioning/transfer devices  Goal: Promote/optimize nutrition  Outcome: Progressing  Flowsheets (Taken 8/12/2024 2116)  Promote/optimize nutrition:   Monitor/record intake including meals   Consume > 50% meals/supplements  Goal: Promote skin healing  Outcome: Progressing  Flowsheets (Taken 8/12/2024 2116)  Promote skin healing:   Turn/reposition every 2 hours/use positioning/transfer devices   Ensure correct size (line/device) and apply per  instructions     Problem: Fall/Injury  Goal: Not fall by end of shift  Outcome: Progressing  Goal: Be free from injury by end of the shift  Outcome: Progressing  Goal: Verbalize understanding of personal risk factors for fall in the hospital  Outcome: Progressing  Goal: Verbalize understanding of risk  factor reduction measures to prevent injury from fall in the home  Outcome: Progressing  Goal: Use assistive devices by end of the shift  Outcome: Progressing  Goal: Pace activities to prevent fatigue by end of the shift  Outcome: Progressing     Problem: Renal Failure  Goal: I will maintain optimum fluid volume with treatment  Outcome: Progressing     Problem: Recent hospitalization or complication while living with HTN  Goal: Patient will effectively self-manage their HTN disease process  Outcome: Progressing

## 2024-08-14 ENCOUNTER — APPOINTMENT (OUTPATIENT)
Dept: RADIOLOGY | Facility: HOSPITAL | Age: 74
DRG: 682 | End: 2024-08-14
Payer: MEDICARE

## 2024-08-14 ENCOUNTER — APPOINTMENT (OUTPATIENT)
Dept: CARDIOLOGY | Facility: HOSPITAL | Age: 74
DRG: 682 | End: 2024-08-14
Payer: MEDICARE

## 2024-08-14 LAB
ACETONE SERPL-MCNC: <5 MG/DL
ALBUMIN SERPL-MCNC: 3.9 G/DL (ref 3.5–5)
ALP BLD-CCNC: 78 U/L (ref 35–125)
ALT SERPL-CCNC: 260 U/L (ref 5–40)
ANCA AB PATTERN SER IF-IMP: NORMAL
ANCA IGG TITR SER IF: NORMAL {TITER}
ANION GAP SERPL CALC-SCNC: 16 MMOL/L
ARTERIAL PATENCY WRIST A: POSITIVE
AST SERPL-CCNC: 125 U/L (ref 5–40)
BASE EXCESS BLDA CALC-SCNC: 3.3 MMOL/L (ref -2–3)
BILIRUB SERPL-MCNC: 0.8 MG/DL (ref 0.1–1.2)
BODY TEMPERATURE: 37 DEGREES CELSIUS
BUN SERPL-MCNC: 49 MG/DL (ref 8–25)
CALCIUM SERPL-MCNC: 9.1 MG/DL (ref 8.5–10.4)
CHLORIDE SERPL-SCNC: 100 MMOL/L (ref 97–107)
CO2 SERPL-SCNC: 22 MMOL/L (ref 24–31)
CREAT SERPL-MCNC: 3.2 MG/DL (ref 0.4–1.6)
EGFRCR SERPLBLD CKD-EPI 2021: 20 ML/MIN/1.73M*2
ERYTHROCYTE [DISTWIDTH] IN BLOOD BY AUTOMATED COUNT: 16.1 % (ref 11.5–14.5)
ETHANOL SERPL-MCNC: <5 MG/DL
ETHYLENE GLYCOL SERPL QL: NOT DETECTED
GLUCOSE SERPL-MCNC: 89 MG/DL (ref 65–99)
HCO3 BLDA-SCNC: 27.8 MMOL/L (ref 22–26)
HCT VFR BLD AUTO: 42.9 % (ref 41–52)
HCV RNA SERPL NAA+PROBE-ACNC: ABNORMAL IU/ML
HCV RNA SERPL NAA+PROBE-ACNC: DETECTED K[IU]/ML
HCV RNA SERPL NAA+PROBE-LOG IU: 5.26 LOG IU/ML
HGB BLD-MCNC: 14.1 G/DL (ref 13.5–17.5)
INHALED O2 CONCENTRATION: 21 %
ISOPROPANOL SERPL-MCNC: <5 MG/DL
LABORATORY COMMENT REPORT: ABNORMAL
MCH RBC QN AUTO: 33 PG (ref 26–34)
MCHC RBC AUTO-ENTMCNC: 32.9 G/DL (ref 32–36)
MCV RBC AUTO: 101 FL (ref 80–100)
METHANOL SERPL-MCNC: <5 MG/DL
NRBC BLD-RTO: 0.6 /100 WBCS (ref 0–0)
OXYHGB MFR BLDA: 94.7 % (ref 94–98)
PCO2 BLDA: 41 MM HG (ref 38–42)
PH BLDA: 7.44 PH (ref 7.38–7.42)
PLATELET # BLD AUTO: 119 X10*3/UL (ref 150–450)
PO2 BLDA: 80 MM HG (ref 85–95)
POTASSIUM SERPL-SCNC: 3.7 MMOL/L (ref 3.4–5.1)
PROT SERPL-MCNC: 7 G/DL (ref 5.9–7.9)
RBC # BLD AUTO: 4.27 X10*6/UL (ref 4.5–5.9)
RENIN PLAS-CCNC: 2.7 NG/ML/HR
SAO2 % BLDA: 97 % (ref 94–100)
SCAN RESULT: NORMAL
SCAN RESULT: NORMAL
SODIUM SERPL-SCNC: 138 MMOL/L (ref 133–145)
SPECIMEN DRAWN FROM PATIENT: ABNORMAL
WBC # BLD AUTO: 8.7 X10*3/UL (ref 4.4–11.3)

## 2024-08-14 PROCEDURE — 2500000004 HC RX 250 GENERAL PHARMACY W/ HCPCS (ALT 636 FOR OP/ED): Performed by: STUDENT IN AN ORGANIZED HEALTH CARE EDUCATION/TRAINING PROGRAM

## 2024-08-14 PROCEDURE — 80053 COMPREHEN METABOLIC PANEL: CPT | Performed by: HOSPITALIST

## 2024-08-14 PROCEDURE — 2060000001 HC INTERMEDIATE ICU ROOM DAILY

## 2024-08-14 PROCEDURE — 2500000001 HC RX 250 WO HCPCS SELF ADMINISTERED DRUGS (ALT 637 FOR MEDICARE OP): Performed by: NURSE PRACTITIONER

## 2024-08-14 PROCEDURE — 2500000002 HC RX 250 W HCPCS SELF ADMINISTERED DRUGS (ALT 637 FOR MEDICARE OP, ALT 636 FOR OP/ED): Performed by: HOSPITALIST

## 2024-08-14 PROCEDURE — 74150 CT ABDOMEN W/O CONTRAST: CPT | Performed by: RADIOLOGY

## 2024-08-14 PROCEDURE — 93005 ELECTROCARDIOGRAM TRACING: CPT

## 2024-08-14 PROCEDURE — 70551 MRI BRAIN STEM W/O DYE: CPT | Performed by: RADIOLOGY

## 2024-08-14 PROCEDURE — 99222 1ST HOSP IP/OBS MODERATE 55: CPT | Performed by: STUDENT IN AN ORGANIZED HEALTH CARE EDUCATION/TRAINING PROGRAM

## 2024-08-14 PROCEDURE — 36415 COLL VENOUS BLD VENIPUNCTURE: CPT | Performed by: HOSPITALIST

## 2024-08-14 PROCEDURE — 2500000001 HC RX 250 WO HCPCS SELF ADMINISTERED DRUGS (ALT 637 FOR MEDICARE OP): Performed by: STUDENT IN AN ORGANIZED HEALTH CARE EDUCATION/TRAINING PROGRAM

## 2024-08-14 PROCEDURE — 82805 BLOOD GASES W/O2 SATURATION: CPT | Performed by: STUDENT IN AN ORGANIZED HEALTH CARE EDUCATION/TRAINING PROGRAM

## 2024-08-14 PROCEDURE — 99232 SBSQ HOSP IP/OBS MODERATE 35: CPT | Performed by: NURSE PRACTITIONER

## 2024-08-14 PROCEDURE — 85027 COMPLETE CBC AUTOMATED: CPT | Performed by: HOSPITALIST

## 2024-08-14 PROCEDURE — 36600 WITHDRAWAL OF ARTERIAL BLOOD: CPT

## 2024-08-14 PROCEDURE — 2500000004 HC RX 250 GENERAL PHARMACY W/ HCPCS (ALT 636 FOR OP/ED): Performed by: INTERNAL MEDICINE

## 2024-08-14 PROCEDURE — 70551 MRI BRAIN STEM W/O DYE: CPT

## 2024-08-14 PROCEDURE — 74150 CT ABDOMEN W/O CONTRAST: CPT

## 2024-08-14 PROCEDURE — 99233 SBSQ HOSP IP/OBS HIGH 50: CPT | Performed by: STUDENT IN AN ORGANIZED HEALTH CARE EDUCATION/TRAINING PROGRAM

## 2024-08-14 PROCEDURE — S4991 NICOTINE PATCH NONLEGEND: HCPCS | Performed by: HOSPITALIST

## 2024-08-14 PROCEDURE — 82810 BLOOD GASES O2 SAT ONLY: CPT | Performed by: STUDENT IN AN ORGANIZED HEALTH CARE EDUCATION/TRAINING PROGRAM

## 2024-08-14 RX ORDER — AMLODIPINE BESYLATE 10 MG/1
10 TABLET ORAL DAILY
Status: DISCONTINUED | OUTPATIENT
Start: 2024-08-15 | End: 2024-08-22 | Stop reason: HOSPADM

## 2024-08-14 RX ORDER — TALC
6 POWDER (GRAM) TOPICAL NIGHTLY
Status: DISCONTINUED | OUTPATIENT
Start: 2024-08-14 | End: 2024-08-22 | Stop reason: HOSPADM

## 2024-08-14 RX ORDER — TRAZODONE HYDROCHLORIDE 50 MG/1
50 TABLET ORAL NIGHTLY
Status: DISCONTINUED | OUTPATIENT
Start: 2024-08-14 | End: 2024-08-22 | Stop reason: HOSPADM

## 2024-08-14 RX ORDER — AMLODIPINE BESYLATE 5 MG/1
5 TABLET ORAL DAILY
Status: DISCONTINUED | OUTPATIENT
Start: 2024-08-14 | End: 2024-08-14

## 2024-08-14 RX ADMIN — CARVEDILOL 12.5 MG: 12.5 TABLET, FILM COATED ORAL at 16:48

## 2024-08-14 RX ADMIN — HEPARIN SODIUM 5000 UNITS: 5000 INJECTION, SOLUTION INTRAVENOUS; SUBCUTANEOUS at 14:32

## 2024-08-14 RX ADMIN — SODIUM BICARBONATE 650 MG: 650 TABLET ORAL at 09:19

## 2024-08-14 RX ADMIN — POLYETHYLENE GLYCOL 3350 17 G: 17 POWDER, FOR SOLUTION ORAL at 09:18

## 2024-08-14 RX ADMIN — SODIUM BICARBONATE 650 MG: 650 TABLET ORAL at 21:33

## 2024-08-14 RX ADMIN — TRAZODONE HYDROCHLORIDE 50 MG: 50 TABLET ORAL at 21:33

## 2024-08-14 RX ADMIN — AMLODIPINE BESYLATE 5 MG: 5 TABLET ORAL at 09:19

## 2024-08-14 RX ADMIN — ACETAMINOPHEN 650 MG: 325 TABLET ORAL at 09:19

## 2024-08-14 RX ADMIN — HYDRALAZINE HYDROCHLORIDE 50 MG: 50 TABLET ORAL at 09:18

## 2024-08-14 RX ADMIN — Medication 1 PATCH: at 09:19

## 2024-08-14 RX ADMIN — HEPARIN SODIUM 5000 UNITS: 5000 INJECTION, SOLUTION INTRAVENOUS; SUBCUTANEOUS at 02:15

## 2024-08-14 RX ADMIN — HYDRALAZINE HYDROCHLORIDE 50 MG: 50 TABLET ORAL at 14:35

## 2024-08-14 RX ADMIN — Medication 6 MG: at 21:33

## 2024-08-14 RX ADMIN — HEPARIN SODIUM 5000 UNITS: 5000 INJECTION, SOLUTION INTRAVENOUS; SUBCUTANEOUS at 09:26

## 2024-08-14 RX ADMIN — CARVEDILOL 12.5 MG: 12.5 TABLET, FILM COATED ORAL at 06:53

## 2024-08-14 ASSESSMENT — PAIN SCALES - GENERAL
PAINLEVEL_OUTOF10: 0 - NO PAIN
PAINLEVEL_OUTOF10: 0 - NO PAIN

## 2024-08-14 ASSESSMENT — COGNITIVE AND FUNCTIONAL STATUS - GENERAL
DRESSING REGULAR UPPER BODY CLOTHING: A LOT
WALKING IN HOSPITAL ROOM: A LOT
TOILETING: A LOT
DRESSING REGULAR LOWER BODY CLOTHING: A LOT
HELP NEEDED FOR BATHING: A LOT
MOVING FROM LYING ON BACK TO SITTING ON SIDE OF FLAT BED WITH BEDRAILS: A LITTLE
TURNING FROM BACK TO SIDE WHILE IN FLAT BAD: A LITTLE
EATING MEALS: A LITTLE
MOBILITY SCORE: 15
STANDING UP FROM CHAIR USING ARMS: A LOT
DAILY ACTIVITIY SCORE: 13
CLIMB 3 TO 5 STEPS WITH RAILING: A LOT
MOVING TO AND FROM BED TO CHAIR: A LITTLE
PERSONAL GROOMING: A LOT

## 2024-08-14 NOTE — PROGRESS NOTES
"Estuardo El is a 74 y.o. male on day 3 of admission presenting with Acute renal failure, unspecified acute renal failure type (CMS-HCC).    Subjective   Patient alert/oriented this am. Denies any GI complaints.        Objective     Physical Exam  HENT:      Head: Normocephalic.      Nose: Nose normal.      Mouth/Throat:      Mouth: Mucous membranes are moist.   Eyes:      Pupils: Pupils are equal, round, and reactive to light.   Cardiovascular:      Rate and Rhythm: Normal rate.   Pulmonary:      Breath sounds: Normal breath sounds.   Abdominal:      Palpations: Abdomen is soft.   Musculoskeletal:      Cervical back: Normal range of motion.   Skin:     General: Skin is warm.   Neurological:      General: No focal deficit present.      Mental Status: He is alert.   Psychiatric:         Mood and Affect: Mood normal.         Last Recorded Vitals  Blood pressure (!) 162/106, pulse 76, temperature 36.4 °C (97.5 °F), temperature source Temporal, resp. rate 14, height 1.778 m (5' 10\"), weight 77.7 kg (171 lb 4.8 oz), SpO2 98%.  Intake/Output last 3 Shifts:  I/O last 3 completed shifts:  In: 240 (3.1 mL/kg) [P.O.:240]  Out: 600 (7.7 mL/kg) [Urine:600 (0.2 mL/kg/hr)]  Weight: 77.7 kg     Relevant Results  CT head wo IV contrast    Result Date: 8/13/2024  Interpreted By:  Ford Pickens, STUDY: CT HEAD WO IV CONTRAST;  8/13/2024 2:10 pm   INDICATION: Signs/Symptoms:ams.   COMPARISON: Head CT dated 08/11/2024.   ACCESSION NUMBER(S): [id number]   ORDERING CLINICIAN: ITZEL ELMORE   TECHNIQUE: Axial noncontrast CT images of the head.   FINDINGS: Gray-white matter differentiation is maintained. There are no extra-axial collections. No mass effect or midline shift. There is no hydrocephalus. Mild generalized cerebral volume loss and associated ventricular and sulcal enlargement. Scattered and confluence periventricular and deep white matter hypodensities suggestive of moderate chronic microvascular ischemic change, most " pronounced in the periatrial and temporal regions.   HEMORRHAGE: No acute intracranial hemorrhage.   CALVARIUM: No depressed skull fracture.   EXTRACRANIAL SOFT TISSUES: Unremarkable.   PARANASAL SINUSES/MASTOIDS: The visualized paranasal sinuses are well aerated. Mastoid air cells are clear.   ORBITS: Grossly normal.       No acute cortical infarct or acute intracranial hemorrhage. Scattered and confluence white matter hypodensities suggestive of moderate chronic microvascular ischemic change.   Signed by: Ford Pickens 8/13/2024 3:11 PM Dictation workstation:   [id number]    Transthoracic Echo (TTE) Complete    Result Date: 8/12/2024            [facility]– [place][note 2[place] [address], [place] [id number]             Phone [phone] TRANSTHORACIC ECHOCARDIOGRAM REPORT Patient Name:      SAURABH FLORES         Reading Physician:    [id number] Meng Greer DO Study Date:        8/12/2024             Ordering Provider:    [id number] FLORENTINO VALENTINE MRN/PID:           [id number]              Fellow: Accession#:        [id number]          Nurse: Date of Birth/Age: 1950 / 74 years  Sonographer:          Raquel Celestin Gender:            M                     Additional Staff: Height:            177.80 cm             Admit Date: Weight:            92.08 kg              Admission Status:     Inpatient -                                                                Routine BSA / BMI:         2.10 m2 / 29.13 kg/m2 Department Location:  [facility] Blood Pressure: 138 /118 mmHg Study Type:    TRANSTHORACIC ECHO (TTE) COMPLETE Diagnosis/ICD: Acute on chronic diastolic (congestive) heart failure                (CHF)-I50.33 CPT Codes:     Echo Complete w Full Doppler-[id number] Patient History: Pertinent History: CHF and HTN. Study Detail: The following Echo studies were performed: 2D, Doppler,  color               flow, Strain and 3D. Definity used as a contrast agent for               endocardial border definition. Total contrast used for this               procedure was 1 mL via IV push.  PHYSICIAN INTERPRETATION: Left Ventricle: Left ventricular ejection fraction is mildly decreased, by visual estimate at 40-45%. There is global hypokinesis of the left ventricle with minor regional variations. The left ventricular cavity size is normal. Left Ventricular Global Longitudinal Strain - -9.3 %. Spectral Doppler shows an impaired relaxation pattern of left ventricular diastolic filling. Left Atrium: The left atrium is normal in size. Right Ventricle: The right ventricle is normal in size. There is low normal right ventricular systolic function. Right Atrium: The right atrium is mildly dilated. Aortic Valve: The aortic valve is trileaflet. The aortic valve dimensionless index is 0.74. There is no evidence of aortic valve regurgitation. The peak instantaneous gradient of the aortic valve is 5.7 mmHg. The mean gradient of the aortic valve is 3.1 mmHg. Mitral Valve: The mitral valve is mildly thickened. There is trace mitral valve regurgitation. Tricuspid Valve: The tricuspid valve is structurally normal. There is trace tricuspid regurgitation. Pulmonic Valve: The pulmonic valve is structurally normal. There is no indication of pulmonic valve regurgitation. Pericardium: There is no pericardial effusion noted. Aorta: The aortic root is normal.  CONCLUSIONS:  1. Left ventricular ejection fraction is mildly decreased, by visual estimate at 40-45%.  2. Spectral Doppler shows an impaired relaxation pattern of left ventricular diastolic filling.  3. There is low normal right ventricular systolic function. QUANTITATIVE DATA SUMMARY: 2D MEASUREMENTS:                           Normal Ranges: IVSd:          1.38 cm    (0.6-1.1cm) LVPWd:         0.88 cm    (0.6-1.1cm) LVIDd:         4.98 cm    (3.9-5.9cm) LVIDs:          4.34 cm LV Mass Index: 101.3 g/m2 LV % FS        12.8 % LA VOLUME:                              Normal Ranges: LA Vol A4C:       58.1 ml    (22+/-6mL/m2) LA Vol A2C:       74.1 ml LA Vol BP:        68.5 ml LA Vol Index A4C: 27.7 ml/m2 LA Vol Index A2C: 35.3 ml/m2 LA Vol Index BP:  32.6 ml/m2 LA Volume Index:  32.6 ml/m2 LA Vol A4C:       53.3 ml LA Vol A2C:       69.6 ml LA Vol Index BSA: 29.3 ml/m2 RA VOLUME BY A/L METHOD:                       Normal Ranges: RA Area A4C: 22.3 cm2 AORTA MEASUREMENTS:                      Normal Ranges: Ao Sinus, d: 3.10 cm (2.1-3.5cm) Ao STJ, d:   2.30 cm (1.7-3.4cm) Asc Ao, d:   3.20 cm (2.1-3.4cm) LV SYSTOLIC FUNCTION BY 2D PLANIMETRY (MOD):                                        Normal Ranges: EF-A4C View:                      40 % (>=55%) EF-A2C View:                      40 % EF-Biplane:                       37 % EF-Visual:                        43 % EF-3DQ:                           35 % EF-Auto:                          35 % LV EF Reported:                   43 % Global Longitudinal Strain (GLS): -9 % LV DIASTOLIC FUNCTION:                         Normal Ranges: MV Peak E:    0.49 m/s  (0.7-1.2 m/s) MV Peak A:    0.93 m/s  (0.42-0.7 m/s) E/A Ratio:    0.52      (1.0-2.2) MV e'         0.047 m/s (>8.0) MV lateral e' 0.06 m/s MV medial e'  0.04 m/s E/e' Ratio:   10.27     (<8.0) MITRAL VALVE:                 Normal Ranges: MV DT: 227 msec (150-240msec) AORTIC VALVE:                                   Normal Ranges: AoV Vmax:                1.19 m/s (<=1.7m/s) AoV Peak P.7 mmHg (<20mmHg) AoV Mean PG:             3.1 mmHg (1.7-11.5mmHg) LVOT Max Ady:            1.02 m/s (<=1.1m/s) AoV VTI:                 19.50 cm (18-25cm) LVOT VTI:                14.35 cm LVOT Diameter:           2.03 cm  (1.8-2.4cm) AoV Area, VTI:           2.38 cm2 (2.5-5.5cm2) AoV Area,Vmax:           2.75 cm2 (2.5-4.5cm2) AoV Dimensionless Index: 0.74  RIGHT VENTRICLE: RV Basal 4.01 cm  TAPSE:   20.9 mm RV s'    0.11 m/s TRICUSPID VALVE/RVSP:                             Normal Ranges: Peak TR Velocity: 2.49 m/s RV Syst Pressure: 27.8 mmHg (< 30mmHg) IVC Diam:         1.68 cm AORTA: Asc Ao Diam 3.21 cm  [id number] Meng Greer DO Electronically signed on 8/12/2024 at 1:25:24 PM  ** Final **       Scheduled medications  amLODIPine, 5 mg, oral, Daily  [Held by provider] aspirin, 81 mg, oral, Daily  carvedilol, 12.5 mg, oral, BID  heparin, 5,000 Units, subcutaneous, q8h  hydrALAZINE, 50 mg, oral, TID  nicotine, 1 patch, transdermal, Daily  perflutren protein A microsphere, 0.5 mL, intravenous, Once in imaging  polyethylene glycol, 17 g, oral, Daily  sodium bicarbonate, 650 mg, oral, BID  sulfur hexafluoride microsphr, 2 mL, intravenous, Once in imaging      Continuous medications     PRN medications  PRN medications: acetaminophen, ondansetron  Results for orders placed or performed during the hospital encounter of 08/11/24 (from the past 24 hour(s))   Protime-INR   Result Value Ref Range    Protime 12.8 (H) 9.3 - 12.7 seconds    INR 1.2 0.9 - 1.2   POCT GLUCOSE   Result Value Ref Range    POCT Glucose 118 (H) 74 - 99 mg/dL   Ammonia   Result Value Ref Range    Ammonia 29 12 - 45 umol/L   Comprehensive Metabolic Panel   Result Value Ref Range    Glucose 89 65 - 99 mg/dL    Sodium 138 133 - 145 mmol/L    Potassium 3.7 3.4 - 5.1 mmol/L    Chloride 100 97 - 107 mmol/L    Bicarbonate 22 (L) 24 - 31 mmol/L    Urea Nitrogen 49 (H) 8 - 25 mg/dL    Creatinine 3.20 (H) 0.40 - 1.60 mg/dL    eGFR 20 (L) >60 mL/min/1.73m*2    Calcium 9.1 8.5 - 10.4 mg/dL    Albumin 3.9 3.5 - 5.0 g/dL    Alkaline Phosphatase 78 35 - 125 U/L    Total Protein 7.0 5.9 - 7.9 g/dL     (H) 5 - 40 U/L    Bilirubin, Total 0.8 0.1 - 1.2 mg/dL     (H) 5 - 40 U/L    Anion Gap 16 <=19 mmol/L   CBC   Result Value Ref Range    WBC 8.7 4.4 - 11.3 x10*3/uL    nRBC 0.6 (H) 0.0 - 0.0 /100 WBCs    RBC 4.27 (L) 4.50 - 5.90 x10*6/uL     Hemoglobin 14.1 13.5 - 17.5 g/dL    Hematocrit 42.9 41.0 - 52.0 %     (H) 80 - 100 fL    MCH 33.0 26.0 - 34.0 pg    MCHC 32.9 32.0 - 36.0 g/dL    RDW 16.1 (H) 11.5 - 14.5 %    Platelets 119 (L) 150 - 450 x10*3/uL                            Assessment/Plan   Assessment & Plan    Elevated LFTs, NSTEMI               -Predominately hepatocellular. His BP is actually high. No syncope. Less likely shock liver. Less likely congestive. Denies current ETOH but he does have a prior history for many years. No listed home meds. Denies antibiotics Possible elevated CK/rhabdo contributing vs Tylenol use (he reports taking a lot of extra strength at home)     8/13   LFTs down trending. Acetaminophen negative. RUQ US fatty liver. Normal gallbladder. Hep C + antibody. He was treated, pending RNA by PCR. Continue to monitor CMP, INR, Continue supportive care.       8/14  LFTs continue to improve. Hep C viral load 592,000. Discussed with patient the need for close follow up for Hep C management/treatment and liver surveillance. Patient agreeable to follow up. Will call son and update plan. GI will sign off     Berna Cristobal, MICHEL-CNP

## 2024-08-14 NOTE — PROGRESS NOTES
08/14/24 1557   Discharge Planning   Expected Discharge Disposition SNF  (referral sent)     TCC spoke to patient giovany Ignacio received choice for [facility]   Referral sent at this time   Precert needed prior to discharge     MD WILL NEED TO SIGN/CERTIFY GOLDENROD AT THE TIME OF DISCHARGE FOR SNF.   ** do not discharge without speaking to care coordination**

## 2024-08-14 NOTE — CARE PLAN
Problem: Skin  Goal: Decreased wound size/increased tissue granulation at next dressing change  8/14/2024 1235 by Janeth Wolf RN  Flowsheets (Taken 8/14/2024 1235)  Decreased wound size/increased tissue granulation at next dressing change: Protective dressings over bony prominences  8/14/2024 1235 by Janeth Wolf RN  Outcome: Progressing  Flowsheets (Taken 8/14/2024 1235)  Decreased wound size/increased tissue granulation at next dressing change: Protective dressings over bony prominences  Goal: Participates in plan/prevention/treatment measures  8/14/2024 1235 by Janeth Wolf RN  Flowsheets (Taken 8/14/2024 1235)  Participates in plan/prevention/treatment measures: Elevate heels  8/14/2024 1235 by Janeth Wolf RN  Outcome: Progressing  Flowsheets (Taken 8/14/2024 1235)  Participates in plan/prevention/treatment measures: Elevate heels  Goal: Prevent/manage excess moisture  8/14/2024 1235 by Janeth Wolf RN  Flowsheets (Taken 8/14/2024 1235)  Prevent/manage excess moisture: Moisturize dry skin  8/14/2024 1235 by Janeth Wolf RN  Outcome: Progressing  Flowsheets (Taken 8/14/2024 1235)  Prevent/manage excess moisture: Moisturize dry skin  Goal: Prevent/minimize sheer/friction injuries  8/14/2024 1235 by Janeth Wolf RN  Flowsheets (Taken 8/14/2024 1235)  Prevent/minimize sheer/friction injuries: Use pull sheet  8/14/2024 1235 by Janeth Wolf RN  Outcome: Progressing  Flowsheets (Taken 8/14/2024 1235)  Prevent/minimize sheer/friction injuries: Use pull sheet  Goal: Promote/optimize nutrition  8/14/2024 1235 by Janeth Wolf RN  Flowsheets (Taken 8/14/2024 1235)  Promote/optimize nutrition: Assist with feeding  8/14/2024 1235 by Janeth Wolf RN  Outcome: Progressing  Flowsheets (Taken 8/14/2024 1235)  Promote/optimize nutrition: Assist with feeding  Goal: Promote skin healing  8/14/2024 1235 by Janeth Wolf RN  Flowsheets (Taken 8/14/2024 1235)  Promote skin healing: Turn/reposition every 2 hours/use  positioning/transfer devices  8/14/2024 1235 by Janeth Wolf RN  Outcome: Progressing  Flowsheets (Taken 8/14/2024 1235)  Promote skin healing: Turn/reposition every 2 hours/use positioning/transfer devices     Problem: Fall/Injury  Goal: Not fall by end of shift  Outcome: Progressing  Goal: Be free from injury by end of the shift  Outcome: Progressing  Goal: Verbalize understanding of personal risk factors for fall in the hospital  Outcome: Progressing  Goal: Verbalize understanding of risk factor reduction measures to prevent injury from fall in the home  Outcome: Progressing  Goal: Use assistive devices by end of the shift  Outcome: Progressing  Goal: Pace activities to prevent fatigue by end of the shift  Outcome: Progressing     Problem: Renal Failure  Goal: I will maintain optimum fluid volume with treatment  Outcome: Progressing     Problem: Recent hospitalization or complication while living with HTN  Goal: Patient will effectively self-manage their HTN disease process  Outcome: Progressing   The patient's goals for the shift include      The clinical goals for the shift include monitor VS

## 2024-08-14 NOTE — NURSING NOTE
Patient urinated before sample order placed. Patient has not urinated after order for urine sample was placed.  Patient instructed to use urinal if required and save.  Urine sample cup placed in room.

## 2024-08-14 NOTE — CONSULTS
"Inpatient consult to Neurology  Consult performed by: Angélica Parada MD  Consult ordered by: Kayla Boyd MD          History Of Present Illness  Estuardo El is a 74 y.o. male with hx of HTN, CHF, HepC, lumbar laminectomies (per Dr. Ayala's note in 2020) presenting with SOB, admitted for SUZANNE, acute on chronic HF, hypertensive urgency. Neurology consulted for encephalopathy.      Pt initially presented 8/11 with progressive shortness of breath, found with SUZANNE and HF, possible NSTEMI, admitted for further evaluation and management. Pt was making progress in renal function. Then per primary provider's note, pt had a sudden mental status change around 1pm 8/13. Pt was able to follow commands but not answering questions. \"Unclear if pt is unable to speak or if he is refusing to speak. Appears that pt is understanding of when a question is asked but he does not respond with actual words.\"     CTH was done at the time. Reportedly pt was speaking later in the afternoon but seemed confused. This am patient appears improved but not back to baseline.     Pt seen by bedside. Pt was far too drowsy to participate in interview or exam.     Imaging personally reviewed:   CTH 8/11: notable white matter changes, no obvious hypodensity or bleed   CTH 8/13: no new changes     Pt was on no home medications.     Past Medical History  Past Medical History:   Diagnosis Date    CHF (congestive heart failure) (Multi)     Hepatitis C     Hypertension      Surgical History  Past Surgical History:   Procedure Laterality Date    BACK SURGERY       Social History  Social History     Tobacco Use    Smoking status: Every Day     Current packs/day: 1.00     Average packs/day: 1 pack/day for 57.6 years (57.6 ttl pk-yrs)     Types: Cigarettes     Start date: 1967    Smokeless tobacco: Never   Substance Use Topics    Alcohol use: Not Currently     Allergies  Patient has no known allergies.  No medications prior to admission.       Review of " Systems  Neurological Exam    GENERAL APPEARANCE: No acute distress.     CARDIOVASCULAR: Regular, rate and rhythm, no murmurs, normal S1 and S2. Carotid pulses intact without any bruits. Pulses +2 and equal in all extremities. No swelling, varicosities, edema, or tenderness to palpation.      MENTAL STATE:   Pt drowsy, briefly opens eyes to tactile stimulation. Uncooperative with examination.   Recent and remote memory were impaired.  Attention span and concentration were impaired. Language testing was unable to be performed. The patient could not correctly interpret a picture. General fund of knowledge impaired.     OPHTHALMOSCOPIC:   Unable to assess due to limited pt cooperation    CRANIAL NERVES:   CN 2   Difficult to assess due to limited pt cooperation, no clear blink to threat noted.  CN 3, 4, 6   Pupils round, 4 mm in diameter, equally reactive to light. Lids symmetric; no ptosis.   No nystagmus appreciated.   CN 5   Unable to assess due to limited pt cooperation  CN 7   No facial droop seen  CN 8   Unable to assess due to limited pt cooperation  CN 9   Unable to assess due to limited pt cooperation  CN 11   Unable to assess due to limited pt cooperation  CN 12   Unable to assess due to limited pt cooperation    MOTOR:   Muscle bulk and tone were normal in both upper and lower extremities.   No fasciculations, tremor or other abnormal movements were present.   Unable to perform formal strength testing due to limited pt cooperation.    REFLEXES:                       R          L  BR:               2         2  Biceps:         2          2  Triceps:        2          2  Knee:            2          2  Ankle:          1          1    Babinski: toes mute to plantar stimulation bilaterally.   No clonus.    SENSORY:   Unable to fully assess due to pt's impaired mental status.  Withdraws to noxious stimuli throughout     COORDINATION:    Unable to assess due to pt's impaired mental status     GAIT:   Secondary to  "an alteration in the patient's level of alertness and the ability to cooperate, gait testing was not possible and was medically contraindicated    Physical Exam  Last Recorded Vitals  Blood pressure 124/73, pulse 76, temperature 36.5 °C (97.7 °F), temperature source Temporal, resp. rate 16, height 1.778 m (5' 10\"), weight 77.7 kg (171 lb 4.8 oz), SpO2 96%.    Relevant Results                    Bia Coma Scale  Best Eye Response: Spontaneous  Best Verbal Response: Confused  Best Motor Response: Follows commands  Edwards Coma Scale Score: 14                 I have personally reviewed the following imaging results CT head wo IV contrast  No acute cortical infarct or acute intracranial hemorrhage. Scattered and confluence white matter hypodensities suggestive of moderate chronic microvascular ischemic change.   Signed by: Ford Pickens 8/13/2024 3:11 PM Dictation workstation:   [id number]    Transthoracic Echo (TTE) Complete             CONCLUSIONS:  1. Left ventricular ejection fraction is mildly decreased, by visual estimate at 40-45%.  2. Spectral Doppler shows an impaired relaxation pattern of left ventricular diastolic filling.  3. There is low normal right ventricular systolic function.     CT head wo IV contrast  Result Date: 8/11/2024    No acute intracranial hemorrhage or mass-effect.   MACRO: None.   Signed by: Uyen Aburto 8/11/2024 1:36 PM Dictation workstation:   [id number]    US renal complete    Result Date: 8/11/2024  1. Cysts left kidney with a complex cyst in the inferior pole of the left kidney demonstrating a thin internal septation. No nodularity or asymmetric thickened septa   2. No hydronephrosis   MACRO: None.   Signed by: Paul Lerma 8/11/2024 12:57 PM Dictation workstation:   [id number]      Vascular US lower extremity venous duplex bilateral    Result Date: 8/11/2024    No sonographic findings suggestive of right or left lower extremity deep venous thrombosis.   MACRO: None   " Signed by: Joseph Schoenberger 8/11/2024 12:48 PM Dictation workstation:   [id number]    XR chest 1 view    Result Date: 8/11/2024    No acute cardiopulmonary process.   MACRO: None   Signed by: Yesenia Oliveira 8/11/2024 2:20 AM Dictation workstation:   [id number]  .      Assessment/Plan   Assessment & Plan  Acute renal failure, unspecified acute renal failure type (CMS-HCC)      Estuardo El is a 74 y.o. male with hx of HTN, CHF, HepC, lumbar laminectomies (per Dr. Aylaa's note in 2020) presenting with SOB, admitted for SUZANNE, acute on chronic HF, hypertensive urgency. Neurology consulted for encephalopathy. Exam markedly limited due to patient's drowsiness today.     Recs:  - Will review MRI once it's obtained  - Can consider EEG routine afterwards  - Delirium precautions, lights on and curtains open during the day, lights off and minimize sleep interruptions at night         I spent 60 minutes in the professional and overall care of this patient.      Angélica Parada MD

## 2024-08-14 NOTE — PROGRESS NOTES
Estuardo El is a 74 y.o. male on day 3 of admission presenting with Acute renal failure, unspecified acute renal failure type (CMS-HCC).      Subjective   Patient is more confused on my exam today, not answering questions appropriately and seems somnolent.  His creatinine however is trending down to 3.2.       Objective          Vitals 24HR  Heart Rate:  [76-79]   Temp:  [36.2 °C (97.2 °F)-36.6 °C (97.9 °F)]   Resp:  [14-17]   BP: (114-162)/()   Weight:  [77.7 kg (171 lb 4.8 oz)]   SpO2:  [95 %-98 %]       Intake/Output last 3 Shifts:    Intake/Output Summary (Last 24 hours) at 8/14/2024 1134  Last data filed at 8/14/2024 0907  Gross per 24 hour   Intake 175 ml   Output 500 ml   Net -325 ml       Physical Exam  Constitutional:       General: Encephalopathic.  He is not in acute distress.  Cardiovascular:      No friction rub.   Pulmonary:      Effort: Pulmonary effort is normal.      Comments: Mildly diminished breath sounds  Abdominal:      General: Bowel sounds are normal.      Palpations: Abdomen is soft.      Tenderness: There is no guarding or rebound.   Musculoskeletal:      Comments: Mild edema, no cyanosis     Relevant Results  Results for orders placed or performed during the hospital encounter of 08/11/24 (from the past 24 hour(s))   POCT GLUCOSE   Result Value Ref Range    POCT Glucose 118 (H) 74 - 99 mg/dL   Ammonia   Result Value Ref Range    Ammonia 29 12 - 45 umol/L   Comprehensive Metabolic Panel   Result Value Ref Range    Glucose 89 65 - 99 mg/dL    Sodium 138 133 - 145 mmol/L    Potassium 3.7 3.4 - 5.1 mmol/L    Chloride 100 97 - 107 mmol/L    Bicarbonate 22 (L) 24 - 31 mmol/L    Urea Nitrogen 49 (H) 8 - 25 mg/dL    Creatinine 3.20 (H) 0.40 - 1.60 mg/dL    eGFR 20 (L) >60 mL/min/1.73m*2    Calcium 9.1 8.5 - 10.4 mg/dL    Albumin 3.9 3.5 - 5.0 g/dL    Alkaline Phosphatase 78 35 - 125 U/L    Total Protein 7.0 5.9 - 7.9 g/dL     (H) 5 - 40 U/L    Bilirubin, Total 0.8 0.1 - 1.2 mg/dL      (H) 5 - 40 U/L    Anion Gap 16 <=19 mmol/L   CBC   Result Value Ref Range    WBC 8.7 4.4 - 11.3 x10*3/uL    nRBC 0.6 (H) 0.0 - 0.0 /100 WBCs    RBC 4.27 (L) 4.50 - 5.90 x10*6/uL    Hemoglobin 14.1 13.5 - 17.5 g/dL    Hematocrit 42.9 41.0 - 52.0 %     (H) 80 - 100 fL    MCH 33.0 26.0 - 34.0 pg    MCHC 32.9 32.0 - 36.0 g/dL    RDW 16.1 (H) 11.5 - 14.5 %    Platelets 119 (L) 150 - 450 x10*3/uL            Assessment/Plan   Acute kidney injury, the differential diagnosis is broad given his complex medical history of congestive heart failure, hepatitis C, BPH, uncontrolled hypertension, however his renal function is improving  Hypertensive urgency, improving, however workup is suggestive of primary aldosteronism  Congestive heart failure  Hepatitis C  BPH  Metabolic acidosis  Elevated liver enzymes  Encephalopathy, needs comprehensive neurologic workup, has been progressively worsening on the hospitalization. I do not believe it is related to significant drop in blood pressure as yesterday his blood pressure went from the 140s to 150s systolic to 102 and BP now elevated again in 160s systolic. I do not believe that would explain this change     Plan: Cryoglobulins, urine protein creatinine ratio, ethylene glycol and methanol level are all pending however his renal function is improving. Noted the aspirin was held, will continue to monitor his renal function closely and monitor for any renal biopsy indications.  Monitor blood pressure closely.  Avoid diuretics or IV fluids at this time.  Renin and aldosterone are suggestive of primary aldosteronism therefore will check a CT of the adrenal glands without contrast given his acute kidney injury.  Continue oral bicarbonate.  Cardiology and GI on board.  No dialysis needs, monitor closely. He will need outpatient follow-up for complex renal cyst with urology.  He is more confused today however and I discussed the case in detail with the hospitalist, who will  consult neurology and get an MRI of the brain.    René Peralta MD

## 2024-08-14 NOTE — PROGRESS NOTES
"Estuardo El is a 74 y.o. male on day 3 of admission presenting with Acute renal failure, unspecified acute renal failure type (CMS-HCC).    Subjective   Alert and oriented x 2-3, denies complaints chest pain or pressure, palpitations or feeling of rapid heart rate, states he feels fine.        Objective     Physical Exam  Vitals and nursing note reviewed.   Constitutional:       Appearance: He is ill-appearing.   HENT:      Head: Normocephalic and atraumatic.      Nose: Nose normal.      Mouth/Throat:      Mouth: Mucous membranes are moist.   Cardiovascular:      Rate and Rhythm: Normal rate and regular rhythm.      Pulses: Normal pulses.      Heart sounds: Normal heart sounds. No murmur heard.     No friction rub. No gallop.   Pulmonary:      Effort: Pulmonary effort is normal.      Breath sounds: Normal breath sounds.   Abdominal:      General: Bowel sounds are normal.      Palpations: Abdomen is soft.   Musculoskeletal:         General: Normal range of motion.      Cervical back: Normal range of motion.      Right lower leg: No edema.      Left lower leg: No edema.   Skin:     General: Skin is warm and dry.      Capillary Refill: Capillary refill takes less than 2 seconds.   Neurological:      Mental Status: He is alert. Mental status is at baseline.   Psychiatric:         Mood and Affect: Mood normal.         Behavior: Behavior normal.         Last Recorded Vitals  Blood pressure (!) 162/106, pulse 78, temperature 35.9 °C (96.6 °F), temperature source Temporal, resp. rate 14, height 1.778 m (5' 10\"), weight 77.7 kg (171 lb 4.8 oz), SpO2 98%.  Intake/Output last 3 Shifts:  I/O last 3 completed shifts:  In: 240 (3.1 mL/kg) [P.O.:240]  Out: 600 (7.7 mL/kg) [Urine:600 (0.2 mL/kg/hr)]  Weight: 77.7 kg     Relevant Results  Results for orders placed or performed during the hospital encounter of 08/11/24 (from the past 24 hour(s))   Protime-INR   Result Value Ref Range    Protime 12.8 (H) 9.3 - 12.7 seconds    INR " 1.2 0.9 - 1.2   POCT GLUCOSE   Result Value Ref Range    POCT Glucose 118 (H) 74 - 99 mg/dL   Ammonia   Result Value Ref Range    Ammonia 29 12 - 45 umol/L   Comprehensive Metabolic Panel   Result Value Ref Range    Glucose 89 65 - 99 mg/dL    Sodium 138 133 - 145 mmol/L    Potassium 3.7 3.4 - 5.1 mmol/L    Chloride 100 97 - 107 mmol/L    Bicarbonate 22 (L) 24 - 31 mmol/L    Urea Nitrogen 49 (H) 8 - 25 mg/dL    Creatinine 3.20 (H) 0.40 - 1.60 mg/dL    eGFR 20 (L) >60 mL/min/1.73m*2    Calcium 9.1 8.5 - 10.4 mg/dL    Albumin 3.9 3.5 - 5.0 g/dL    Alkaline Phosphatase 78 35 - 125 U/L    Total Protein 7.0 5.9 - 7.9 g/dL     (H) 5 - 40 U/L    Bilirubin, Total 0.8 0.1 - 1.2 mg/dL     (H) 5 - 40 U/L    Anion Gap 16 <=19 mmol/L   CBC   Result Value Ref Range    WBC 8.7 4.4 - 11.3 x10*3/uL    nRBC 0.6 (H) 0.0 - 0.0 /100 WBCs    RBC 4.27 (L) 4.50 - 5.90 x10*6/uL    Hemoglobin 14.1 13.5 - 17.5 g/dL    Hematocrit 42.9 41.0 - 52.0 %     (H) 80 - 100 fL    MCH 33.0 26.0 - 34.0 pg    MCHC 32.9 32.0 - 36.0 g/dL    RDW 16.1 (H) 11.5 - 14.5 %    Platelets 119 (L) 150 - 450 x10*3/uL       Assessment/Plan   Principal Problem:    Acute renal failure, unspecified acute renal failure type (CMS-HCC)    Hypertensive emergency  Acute kidney injury  Elevated high sensitive troponin  Generalized weakness  Tobacco use  Chronic diastolic heart failure     Overall impression:     8/11: Consulted for congestive heart failure and NSTEMI.  The patient's symptoms are generalized weakness and fatigue.  Reports no chest pain and some mild shortness of breath/fatigue over the past few weeks.  Chest x-ray is clear.  There is no evidence of fluid volume overload on auscultation.  There is no JVD.  There is no peripheral edema.  Despite this his proBNP is 70,000.  Additionally he has noted to have an acute kidney injury with a creatinine of 4.1 which is new for this patient.  Upon further review the patient has severely hypertensive  with a systolic averaging over 200.  There is a history of hypertension, however the patient does not take medications in the outpatient setting.  It appears the patient is having a hypertensive emergency resulting in elevated proBNP and elevated high-sensitivity troponin; and perhaps hypertensive cardiomyopathy.  Certainly there is also suspicion given the elevated blood pressure of renal artery stenosis.  Will check echocardiogram.  Renal artery stenosis study has been ordered.  At this point given lack of chest pain symptoms we will stop heparin drip.  Agree with amlodipine as first agent.  Would avoid ACEs and ARB's.  Agree with carvedilol.  I have added hydralazine for further blood pressure management.  Patient is significantly decompensated from a cardiac perspective given his hypertensive emergency, and a high risk for further decompensation.  Will follow with you.      8/12: Improved over the past 24 hours.  Patient states he feels significantly improved less anxiety and no longer has a feeling that he is dying.  On telemetry monitor remained in a sinus rhythm with occasional PVCs.  His blood pressure significantly improved with most recent of 138/118.  Creatinine is slowly improving with most recent at 3.9.  Hemoglobin stable at 13.0 he has mild peripheral edema this morning, however his lungs are clear on auscultation.  He is breathing constantly on room air.  He is chest pain-free.  He will go for an echocardiogram this morning which will further define his cardiac status.  At this point he continues on a new combination of amlodipine, carvedilol, and hydralazine.  At this point we continue to avoid ACE or ARB's secondary to acute kidney injury.  Nephrology is following.  They note the patient may require a renal biopsy.  His aspirin has been placed on hold for this.  Overall is improving.  Will follow with you.     8/13: Continues to improve.  Blood pressure improved with most recent of 143/107.   Creatinine significantly improved down to 3.4.  Hemoglobin stable at 13.0.  Echocardiogram yesterday confirms previous diagnosis of diastolic dysfunction but reveals a new reduced ejection fraction of 40 to 45%.  This however is a global hypokinesis and does not reveal significant wall motion abnormalities.  Patient does continue on carvedilol as well as amlodipine and hydralazine, however we are unable to initiate a ACE or an ARB at this time.  Believe the patient would benefit at some time from a nuclear stress test, however this does not need to be completed during this hospitalization.  At this point we will continue to focus on blood pressure management as well as improving kidney function which does continue to significantly improved.  The patient did note to me this morning that he has poor recall of our discussions yesterday and the day before that.  He does appear significantly fatigued on assessment today.  Overall improving.  Euvolemic on exam today.  Chest pain-free.  Will follow with you.    8/14: Stable overnight.  No significant changes.  Breathing comfortably on room air.  Chest pain-free.  Euvolemic.  Blood pressure was very well-controlled yesterday evening but did rise by this morning.  Earlier this morning his blood pressure with systolic over 160, more recently systolic now in the 140s.  His blood pressure is somewhat labile, yesterday evening systolic in the low 100s.  At this point we will continue with current medication strategy.  His creatinine continues to improve with most recent of 3.2.  I have discussed with the patient his plans for discharge, he states he is treated very well here and does not wish to be discharged, however I informed him that he is in an acute hospital and will be discharged at some point.  I question his home situation.  He is planned to discharge to rehab at time of discharge.  Will follow with you.      I spent 35 minutes in the professional and overall care of  this patient.      Juan J Gaines, APRN-CNP

## 2024-08-14 NOTE — CARE PLAN
Problem: Skin  Goal: Decreased wound size/increased tissue granulation at next dressing change  Outcome: Progressing  Goal: Participates in plan/prevention/treatment measures  Outcome: Progressing  Goal: Prevent/manage excess moisture  Outcome: Progressing  Goal: Prevent/minimize sheer/friction injuries  Outcome: Progressing  Goal: Promote/optimize nutrition  Outcome: Progressing  Goal: Promote skin healing  Outcome: Progressing     Problem: Fall/Injury  Goal: Not fall by end of shift  Outcome: Progressing  Goal: Be free from injury by end of the shift  Outcome: Progressing  Goal: Verbalize understanding of personal risk factors for fall in the hospital  Outcome: Progressing  Goal: Verbalize understanding of risk factor reduction measures to prevent injury from fall in the home  Outcome: Progressing  Goal: Use assistive devices by end of the shift  Outcome: Progressing  Goal: Pace activities to prevent fatigue by end of the shift  Outcome: Progressing     Problem: Renal Failure  Goal: I will maintain optimum fluid volume with treatment  Outcome: Progressing     Problem: Recent hospitalization or complication while living with HTN  Goal: Patient will effectively self-manage their HTN disease process  Outcome: Progressing   The patient's goals for the shift include      The clinical goals for the shift include monitor VS         - - -

## 2024-08-14 NOTE — PROGRESS NOTES
Estuardo El is a 74 y.o. male on day 3 of admission presenting with Acute renal failure, unspecified acute renal failure type (CMS-HCC).      Subjective   Yesterday, patient had a drop in blood pressure and became encephalopathic. Mental status improved today.           Objective     Last Recorded Vitals  BP (!) 162/106 (BP Location: Left arm, Patient Position: Lying)   Pulse 76   Temp 36.4 °C (97.5 °F) (Temporal)   Resp 14   Wt 77.7 kg (171 lb 4.8 oz)   SpO2 98%   Intake/Output last 3 Shifts:    Intake/Output Summary (Last 24 hours) at 8/14/2024 1138  Last data filed at 8/14/2024 0907  Gross per 24 hour   Intake 175 ml   Output 500 ml   Net -325 ml       Admission Weight  Weight: 92.5 kg (203 lb 14.8 oz) (08/12/24 0047)    Daily Weight  08/14/24 : 77.7 kg (171 lb 4.8 oz)    Image Results  CT head wo IV contrast  Narrative: Interpreted By:  Ford Pickens,   STUDY:  CT HEAD WO IV CONTRAST;  8/13/2024 2:10 pm      INDICATION:  Signs/Symptoms:ams.      COMPARISON:  Head CT dated 08/11/2024.      ACCESSION NUMBER(S):  [id number]      ORDERING CLINICIAN:  ITZEL ELMORE      TECHNIQUE:  Axial noncontrast CT images of the head.      FINDINGS:  Gray-white matter differentiation is maintained. There are no  extra-axial collections. No mass effect or midline shift. There is no  hydrocephalus. Mild generalized cerebral volume loss and associated  ventricular and sulcal enlargement. Scattered and confluence  periventricular and deep white matter hypodensities suggestive of  moderate chronic microvascular ischemic change, most pronounced in  the periatrial and temporal regions.      HEMORRHAGE: No acute intracranial hemorrhage.      CALVARIUM: No depressed skull fracture.      EXTRACRANIAL SOFT TISSUES: Unremarkable.      PARANASAL SINUSES/MASTOIDS: The visualized paranasal sinuses are well  aerated. Mastoid air cells are clear.      ORBITS: Grossly normal.      Impression: No acute cortical infarct or acute  intracranial hemorrhage. Scattered  and confluence white matter hypodensities suggestive of moderate  chronic microvascular ischemic change.      Signed by: Ford Pickens 8/13/2024 3:11 PM  Dictation workstation:   [id number]      Physical Exam  Constitutional:       General: He is not in acute distress.     Appearance: He is not toxic-appearing.   HENT:      Head: Normocephalic.      Mouth/Throat:      Pharynx: Oropharynx is clear.   Eyes:      General: No scleral icterus.  Cardiovascular:      Rate and Rhythm: Normal rate.   Pulmonary:      Effort: No respiratory distress.      Breath sounds: No wheezing.   Abdominal:      General: There is no distension.      Palpations: Abdomen is soft.   Musculoskeletal:      Right lower leg: No edema.      Left lower leg: No edema.   Neurological:      Mental Status: He is alert. He is disoriented.      Comments: A&Ox2-3. Able to state full name, year, and that he's in a hospital. Does not know the name of the hospital, thinks it's in [place]         Relevant Results               Assessment/Plan          Principal Problem:    Acute renal failure, unspecified acute renal failure type (CMS-HCC)    Metabolic encephalopathy, improving  Suspect due to labile blood pressures/lowering BP too quickly  CT head showing moderate chronic microvascular ischemic change which may indicate that patient has some degree of cognitive impairment at baseline  Ammonia wnl    Acute renal failure, improving  Metabolic acidosis, improving  Nephrology following  Continue PO bicarb  Per nephrology, may need renal biopsy if kidney function does not improve  Aldosterone, renin, and plasma metanephrines ordered, follow up results  Aspirin held in case patient needs renal biopsy      Hypertensive emergency, improving  Cardiology following  Blood pressures labile   Continue amlodipine (reduce dose to 5mg), carvedilol, and hydralazine with hold parameters in place  Avoid ACE/ARB due to renal failure      Chronic diastolic and systolic heart failure  Echo showing EF 40-45%   Management per cardiology     Elevated liver enzymes, improving  Hx of Hep C  GI following - predominantly hepatocellular pattern of injury  Patient reports being treated for Hep C; however Hep C viral load 592,000  RUQ US showing fatty liver     Tobacco use  Continue nicotine patch  Recommend cessation     Hypokalemia  Replace PRN     Generalized weakness/debility  PT/OT - moderate intensity      Case discussed with Tavon Gaines CNP    Called patient's son, Mateus [phone] and updated him that patient's mental status is improved from yesterday. Lab work including renal function and liver function is improved as well. Mateus states that patient is normally A&Ox3 but is intermittently forgetful. Denies overt confusion at baseline. States that the patient will forget what day of the week it is and will mix up cities like [place] and [place] but is generally able to state where he is and the month/year.     Plan:  Renal function, LFTs, and mental status improved today  Continue antihypertensives with hold parameters in place  PT/OT - moderate, patient agreeable to SNF placement      Addendum 11:35 - discussed with Dr. Peralta who is concerned that patient appears more encephalopathic this afternoon. Unclear if this is related to blood pressure or possibly ?behavioral. Will order MRI brain, ABG, and neurology consult for further evaluation of encephalopathy.      Addendum 16:55 - re-evaluated patient. Patient's son, Mateus, is present at bedside. Patient is sitting up at the side of the bed eating dinner. Patient awake and alert. Joking around with this son. Answering questions appropriately. Mental status appears to be significantly improved from this afternoon. States that he hasn't been able to sleep at night, would like to try something to help him sleep. Suspect encephalopathy may be due to delirium/insomnia. Will start trazodone and  melatonin nightly.             Kayla Boyd MD

## 2024-08-15 ENCOUNTER — APPOINTMENT (OUTPATIENT)
Dept: NEUROLOGY | Facility: HOSPITAL | Age: 74
DRG: 682 | End: 2024-08-15
Payer: MEDICARE

## 2024-08-15 PROBLEM — Z72.0 TOBACCO USE: Status: ACTIVE | Noted: 2024-08-15

## 2024-08-15 PROBLEM — I16.1 HYPERTENSIVE EMERGENCY: Status: ACTIVE | Noted: 2024-08-15

## 2024-08-15 PROBLEM — G93.41 ACUTE METABOLIC ENCEPHALOPATHY: Status: ACTIVE | Noted: 2024-08-15

## 2024-08-15 PROBLEM — I50.42 CHRONIC COMBINED SYSTOLIC AND DIASTOLIC HEART FAILURE (MULTI): Status: ACTIVE | Noted: 2024-08-15

## 2024-08-15 PROBLEM — E87.6 HYPOKALEMIA: Status: ACTIVE | Noted: 2024-08-15

## 2024-08-15 PROBLEM — B18.2 CHRONIC HEPATITIS C WITHOUT HEPATIC COMA (MULTI): Status: ACTIVE | Noted: 2024-08-15

## 2024-08-15 PROBLEM — I16.0 HYPERTENSIVE URGENCY: Status: ACTIVE | Noted: 2024-08-15

## 2024-08-15 PROBLEM — N28.1 RENAL CYST: Status: ACTIVE | Noted: 2024-08-15

## 2024-08-15 PROBLEM — E87.20 METABOLIC ACIDOSIS: Status: ACTIVE | Noted: 2024-08-15

## 2024-08-15 PROBLEM — R74.8 ELEVATED LIVER ENZYMES: Status: ACTIVE | Noted: 2024-08-15

## 2024-08-15 LAB
ALBUMIN SERPL-MCNC: 3.5 G/DL (ref 3.5–5)
ALP BLD-CCNC: 65 U/L (ref 35–125)
ALT SERPL-CCNC: 180 U/L (ref 5–40)
ANION GAP SERPL CALC-SCNC: 13 MMOL/L
ANNOTATION COMMENT IMP: ABNORMAL
AST SERPL-CCNC: 69 U/L (ref 5–40)
BILIRUB SERPL-MCNC: 0.6 MG/DL (ref 0.1–1.2)
BUN SERPL-MCNC: 49 MG/DL (ref 8–25)
CALCIUM SERPL-MCNC: 8.9 MG/DL (ref 8.5–10.4)
CHLORIDE SERPL-SCNC: 104 MMOL/L (ref 97–107)
CO2 SERPL-SCNC: 23 MMOL/L (ref 24–31)
CREAT SERPL-MCNC: 3.3 MG/DL (ref 0.4–1.6)
CREAT UR-MCNC: 93.4 MG/DL
EGFRCR SERPLBLD CKD-EPI 2021: 19 ML/MIN/1.73M*2
ERYTHROCYTE [DISTWIDTH] IN BLOOD BY AUTOMATED COUNT: 15.3 % (ref 11.5–14.5)
GLUCOSE SERPL-MCNC: 96 MG/DL (ref 65–99)
HCT VFR BLD AUTO: 38.7 % (ref 41–52)
HGB BLD-MCNC: 13 G/DL (ref 13.5–17.5)
MCH RBC QN AUTO: 32.7 PG (ref 26–34)
MCHC RBC AUTO-ENTMCNC: 33.6 G/DL (ref 32–36)
MCV RBC AUTO: 98 FL (ref 80–100)
METANEPHS SERPL-SCNC: 0.31 NMOL/L (ref 0–0.49)
NORMETANEPH FREE SERPL-SCNC: 1.07 NMOL/L (ref 0–0.89)
NRBC BLD-RTO: 0.2 /100 WBCS (ref 0–0)
PLATELET # BLD AUTO: 113 X10*3/UL (ref 150–450)
POTASSIUM SERPL-SCNC: 4.1 MMOL/L (ref 3.4–5.1)
PROT SERPL-MCNC: 6.6 G/DL (ref 5.9–7.9)
PROT UR-MCNC: 60 MG/DL
PROT/CREAT UR: 0.64 MG/MG CREAT
RBC # BLD AUTO: 3.97 X10*6/UL (ref 4.5–5.9)
SODIUM SERPL-SCNC: 140 MMOL/L (ref 133–145)
WBC # BLD AUTO: 8.4 X10*3/UL (ref 4.4–11.3)

## 2024-08-15 PROCEDURE — 36415 COLL VENOUS BLD VENIPUNCTURE: CPT | Performed by: STUDENT IN AN ORGANIZED HEALTH CARE EDUCATION/TRAINING PROGRAM

## 2024-08-15 PROCEDURE — 2500000001 HC RX 250 WO HCPCS SELF ADMINISTERED DRUGS (ALT 637 FOR MEDICARE OP): Performed by: STUDENT IN AN ORGANIZED HEALTH CARE EDUCATION/TRAINING PROGRAM

## 2024-08-15 PROCEDURE — 85027 COMPLETE CBC AUTOMATED: CPT | Performed by: STUDENT IN AN ORGANIZED HEALTH CARE EDUCATION/TRAINING PROGRAM

## 2024-08-15 PROCEDURE — 80053 COMPREHEN METABOLIC PANEL: CPT | Performed by: STUDENT IN AN ORGANIZED HEALTH CARE EDUCATION/TRAINING PROGRAM

## 2024-08-15 PROCEDURE — 99233 SBSQ HOSP IP/OBS HIGH 50: CPT | Performed by: STUDENT IN AN ORGANIZED HEALTH CARE EDUCATION/TRAINING PROGRAM

## 2024-08-15 PROCEDURE — 82570 ASSAY OF URINE CREATININE: CPT | Performed by: INTERNAL MEDICINE

## 2024-08-15 PROCEDURE — 2060000001 HC INTERMEDIATE ICU ROOM DAILY

## 2024-08-15 PROCEDURE — S4991 NICOTINE PATCH NONLEGEND: HCPCS | Performed by: HOSPITALIST

## 2024-08-15 PROCEDURE — 97116 GAIT TRAINING THERAPY: CPT | Mod: GP,CQ

## 2024-08-15 PROCEDURE — 95816 EEG AWAKE AND DROWSY: CPT

## 2024-08-15 PROCEDURE — 2500000001 HC RX 250 WO HCPCS SELF ADMINISTERED DRUGS (ALT 637 FOR MEDICARE OP): Performed by: INTERNAL MEDICINE

## 2024-08-15 PROCEDURE — 2500000004 HC RX 250 GENERAL PHARMACY W/ HCPCS (ALT 636 FOR OP/ED): Performed by: INTERNAL MEDICINE

## 2024-08-15 PROCEDURE — 2500000004 HC RX 250 GENERAL PHARMACY W/ HCPCS (ALT 636 FOR OP/ED): Performed by: STUDENT IN AN ORGANIZED HEALTH CARE EDUCATION/TRAINING PROGRAM

## 2024-08-15 PROCEDURE — 95816 EEG AWAKE AND DROWSY: CPT | Performed by: PSYCHIATRY & NEUROLOGY

## 2024-08-15 PROCEDURE — 2500000002 HC RX 250 W HCPCS SELF ADMINISTERED DRUGS (ALT 637 FOR MEDICARE OP, ALT 636 FOR OP/ED): Performed by: HOSPITALIST

## 2024-08-15 PROCEDURE — 84156 ASSAY OF PROTEIN URINE: CPT | Performed by: INTERNAL MEDICINE

## 2024-08-15 PROCEDURE — 97110 THERAPEUTIC EXERCISES: CPT | Mod: GO,CO

## 2024-08-15 RX ADMIN — HYDRALAZINE HYDROCHLORIDE 50 MG: 50 TABLET ORAL at 15:37

## 2024-08-15 RX ADMIN — HYDRALAZINE HYDROCHLORIDE 50 MG: 50 TABLET ORAL at 08:37

## 2024-08-15 RX ADMIN — SODIUM BICARBONATE 650 MG: 650 TABLET ORAL at 21:30

## 2024-08-15 RX ADMIN — Medication 6 MG: at 21:30

## 2024-08-15 RX ADMIN — TRAZODONE HYDROCHLORIDE 50 MG: 50 TABLET ORAL at 21:30

## 2024-08-15 RX ADMIN — HEPARIN SODIUM 5000 UNITS: 5000 INJECTION, SOLUTION INTRAVENOUS; SUBCUTANEOUS at 00:11

## 2024-08-15 RX ADMIN — SODIUM BICARBONATE 650 MG: 650 TABLET ORAL at 08:37

## 2024-08-15 RX ADMIN — HEPARIN SODIUM 5000 UNITS: 5000 INJECTION, SOLUTION INTRAVENOUS; SUBCUTANEOUS at 16:54

## 2024-08-15 RX ADMIN — HEPARIN SODIUM 5000 UNITS: 5000 INJECTION, SOLUTION INTRAVENOUS; SUBCUTANEOUS at 08:37

## 2024-08-15 RX ADMIN — Medication 1 PATCH: at 08:37

## 2024-08-15 RX ADMIN — HYDRALAZINE HYDROCHLORIDE 50 MG: 50 TABLET ORAL at 21:30

## 2024-08-15 RX ADMIN — CARVEDILOL 12.5 MG: 12.5 TABLET, FILM COATED ORAL at 16:54

## 2024-08-15 RX ADMIN — CARVEDILOL 12.5 MG: 12.5 TABLET, FILM COATED ORAL at 06:35

## 2024-08-15 RX ADMIN — AMLODIPINE BESYLATE 10 MG: 10 TABLET ORAL at 08:38

## 2024-08-15 ASSESSMENT — PAIN - FUNCTIONAL ASSESSMENT
PAIN_FUNCTIONAL_ASSESSMENT: 0-10

## 2024-08-15 ASSESSMENT — COGNITIVE AND FUNCTIONAL STATUS - GENERAL
PERSONAL GROOMING: A LITTLE
WALKING IN HOSPITAL ROOM: A LOT
EATING MEALS: A LITTLE
MOBILITY SCORE: 17
DAILY ACTIVITIY SCORE: 14
MOVING TO AND FROM BED TO CHAIR: A LITTLE
HELP NEEDED FOR BATHING: A LOT
WALKING IN HOSPITAL ROOM: A LITTLE
MOBILITY SCORE: 11
DRESSING REGULAR UPPER BODY CLOTHING: A LITTLE
DRESSING REGULAR UPPER BODY CLOTHING: A LOT
TURNING FROM BACK TO SIDE WHILE IN FLAT BAD: A LOT
TOILETING: A LITTLE
TOILETING: A LOT
CLIMB 3 TO 5 STEPS WITH RAILING: A LOT
CLIMB 3 TO 5 STEPS WITH RAILING: TOTAL
STANDING UP FROM CHAIR USING ARMS: A LOT
MOVING FROM LYING ON BACK TO SITTING ON SIDE OF FLAT BED WITH BEDRAILS: A LITTLE
DAILY ACTIVITIY SCORE: 17
DRESSING REGULAR LOWER BODY CLOTHING: A LOT
MOVING FROM LYING ON BACK TO SITTING ON SIDE OF FLAT BED WITH BEDRAILS: A LOT
HELP NEEDED FOR BATHING: A LOT
TURNING FROM BACK TO SIDE WHILE IN FLAT BAD: A LITTLE
PERSONAL GROOMING: A LITTLE
STANDING UP FROM CHAIR USING ARMS: A LITTLE
DRESSING REGULAR LOWER BODY CLOTHING: A LOT
MOVING TO AND FROM BED TO CHAIR: A LOT

## 2024-08-15 ASSESSMENT — PAIN SCALES - GENERAL
PAINLEVEL_OUTOF10: 0 - NO PAIN

## 2024-08-15 NOTE — ASSESSMENT & PLAN NOTE
Evaluated by GI  Hep C viral load 592,000  Will need outpatient follow up with Dr. Mcgraw for Hep C treatment and liver surveillance

## 2024-08-15 NOTE — PROGRESS NOTES
Estuardo El is a 74 y.o. male on day 4 of admission presenting with Acute renal failure, unspecified acute renal failure type (CMS-HCC).      Subjective   States he feels well this morning. Sitting up at the side of the bed eating breakfast. A&Ox3, able to state that he was in the hospital in [facility], month, year, full name, and birthday. Denies any complaints. Tolerating PO. He is unsure when his last BM was but denies constipation.        Objective     Last Recorded Vitals  /90 (BP Location: Right arm, Patient Position: Lying)   Pulse 79   Temp 36.5 °C (97.7 °F) (Temporal)   Resp 15   Wt 77.7 kg (171 lb 4.8 oz)   SpO2 97%   Intake/Output last 3 Shifts:    Intake/Output Summary (Last 24 hours) at 8/15/2024 0923  Last data filed at 8/15/2024 0900  Gross per 24 hour   Intake 475 ml   Output 1150 ml   Net -675 ml       Admission Weight  Weight: 92.5 kg (203 lb 14.8 oz) (08/12/24 0047)    Daily Weight  08/15/24 : 77.7 kg (171 lb 4.8 oz)    Image Results  CT adrenals wo IV contrast  Narrative: Interpreted By:  Paul Lerma,   STUDY:  CT ADRENALS WO IV CONTRAST; ;  8/14/2024 11:56 am      INDICATION:  Signs/Symptoms:primary aldosteronism.      COMPARISON:  CT 03/06/2017 and 11/10/2022      ACCESSION NUMBER(S):  [id number]      ORDERING CLINICIAN:  GERBER ALTMAN      TECHNIQUE:  Serial axial unenhanced CT images obtained of the abdomen utilizing  protocol. Images reformatted in the coronal and sagittal projection.      All CT examinations are performed with 1 or more of the following  dose reduction techniques: Automated exposure control, adjustment of  mA and/or kv according to patient's size, or use of iterative  reconstruction techniques.      FINDINGS:  Adrenal glands demonstrate no evidence for nodularity. The  configuration appearance of the adrenal glands is stable from remote  CT including 03/06/2017. No hypertrophic changes demonstrated      Included lung bases demonstrates mild right  basilar dependent  atelectasis. No infiltrate or effusion identified. Distal esophagus  is unremarkable      Liver is unremarkable within limits of this unenhanced CT      Gallbladder is contracted      Spleen is unremarkable      Pancreas is unremarkable within limits of this unenhanced CT      Right kidney demonstrates a hypodensity with cyst in the superior  pole identified measuring 1.1 cm. Also, hypodensity in the inferior  pole of the right kidney suggesting cysts measuring 1.0 cm.      Left kidney demonstrates a cyst in the inferior pole identified  measuring 3.8 cm. Also, within the inferior pole there is a cyst  identified measuring 2.1 cm.      Retroperitoneum demonstrates no lymphadenopathy. Mild vascular  calcification of the abdominal aorta.      Included portions visualized loops of large bowel are unremarkable.  Small bowel loops are nondilated. Appendix is seen and is  unremarkable. There is no lymphadenopathy in the mesentery. Stomach  is distended with food contents. Scattered foci of subcutaneous  emphysema along the right anterior abdominal wall. Correlate with  injection. There is a small umbilical hernia containing fat measuring  2.1 cm.      Visualized osseous structures demonstrate mild facet arthropathy  lower lumbar spine. Multilevel degenerative discogenic changes are  demonstrated within the visualized thoracolumbar spine no compression  deformity demonstrated. No significant narrowing of the thecal sac.  Postsurgical changes are demonstrated within the L4 with laminectomy              Impression: 1. Unremarkable visualized adrenal glands. No nodularity demonstrated.      2. Cysts bilateral kidneys              MACRO:  None      Signed by: Paul Lerma 8/14/2024 1:43 PM  Dictation workstation:   [id number]      Physical Exam  Constitutional:       General: He is not in acute distress.     Appearance: He is not toxic-appearing.   HENT:      Head: Normocephalic.      Mouth/Throat:       Pharynx: Oropharynx is clear.   Eyes:      General: No scleral icterus.  Cardiovascular:      Rate and Rhythm: Normal rate.   Pulmonary:      Effort: No respiratory distress.      Breath sounds: No wheezing.   Abdominal:      General: There is no distension.      Palpations: Abdomen is soft.   Musculoskeletal:      Right lower leg: No edema.      Left lower leg: No edema.   Neurological:      Mental Status: He is alert and oriented to person, place, and time.      Comments: A&Ox3, able to state month, year, full name, birth date, and that he's in a hospital in [facility]         Relevant Results               Assessment/Plan          Assessment & Plan  Acute renal failure, unspecified acute renal failure type (CMS-HCC)  Nephrology following  Per nephrology, may need renal biopsy - aspirin held  Improving, creatinine appears to be plateauing around 3.30  Ethylene glycol and volatile compound panel negative  Follow up cryoglobulins and urine protein creatinine ratio   Metabolic acidosis  Resolved  Continue PO bicarb  Acute metabolic encephalopathy  Suspect due to delirium from acute illness and sleep disturbance at night  Also likely has a component of cognitive impairment at baseline - per son, patient is baseline A&Ox3 but is forgetful of smaller details like days of the week and exact city/location  Neurology following  Ammonia wnl, pCO2 wnl  CT head negative  MRI brain negative  EEG ordered by neurology, follow up results  Started on trazodone 50mg nightly with significant improvement in mental status this morning  Delirium precautions  Hypertensive urgency  Cardiology following  Continue amlodipine, hydralazine, and coreg  Avoiding ACEi/ARBs for now due to renal failure  Workup suggestive of primary hyperaldosteronism  CT adrenals unremarkable  Improving   Renal cyst  Renal US showing complex cyst in the inferior pole of the left kidney  Will need outpatient follow up for complex renal cyst with urology    Chronic combined systolic and diastolic heart failure (Multi)  Echo showing EF 40-45% with diastolic dysfunction  Avoiding ACEi/ARB and diuretics due to renal failure  Management per cardiology/nephrology   Chronic hepatitis C without hepatic coma (Multi)  Evaluated by GI  Hep C viral load 592,000  Will need outpatient follow up with Dr. Mcgraw for Hep C treatment and liver surveillance   Elevated liver enzymes  Evaluated by GI - predominately hepatocellular  Has hx of EtOH use and Hep C  Improving   Tobacco use  Nicotine patch  Recommend cessation  Hypokalemia  Replace PRN  resolved    Case discussed with Dr. Parada from neurology     Plan:  Mental status significantly improved this AM  EEG ordered by neurology, will follow up results  Management of blood pressure per cardiology/nephrology   Continue to hold aspirin in case renal biopsy is needed  Continue nightly trazodone  PT/OT - moderate intensity, patient agreeable to SNF placement              Kayla Boyd MD

## 2024-08-15 NOTE — SIGNIFICANT EVENT
Pt off the floor for EEG -- however was not present in EEG room as well.  MRI motion degraded, possible punctate DWI lesion in R corona radiata which could be artifactual, no obvious cortical lesions.    Will await EEG read. If no epileptic waveforms, combined with negative MRI with any cortical structural defects, patient's overall risk of having seizures is considered low.     Thank you for the consult. Will continue to follow for EEG. Please do not hesitate to reach out with any questions.     Angélica Parada MD   Vascular Neurologist

## 2024-08-15 NOTE — ASSESSMENT & PLAN NOTE
Nephrology following  Per nephrology, may need renal biopsy - aspirin held  Improving, creatinine appears to be plateauing around 3.30  Ethylene glycol and volatile compound panel negative  Follow up cryoglobulins and urine protein creatinine ratio

## 2024-08-15 NOTE — CARE PLAN
Problem: Skin  Goal: Decreased wound size/increased tissue granulation at next dressing change  Outcome: Progressing  Goal: Participates in plan/prevention/treatment measures  Outcome: Progressing  Goal: Prevent/manage excess moisture  Outcome: Progressing  Goal: Prevent/minimize sheer/friction injuries  Outcome: Progressing  Goal: Promote/optimize nutrition  Outcome: Progressing  Goal: Promote skin healing  Outcome: Progressing     Problem: Fall/Injury  Goal: Not fall by end of shift  Outcome: Progressing  Goal: Be free from injury by end of the shift  Outcome: Progressing  Goal: Verbalize understanding of personal risk factors for fall in the hospital  Outcome: Progressing  Goal: Verbalize understanding of risk factor reduction measures to prevent injury from fall in the home  Outcome: Progressing  Goal: Use assistive devices by end of the shift  Outcome: Progressing  Goal: Pace activities to prevent fatigue by end of the shift  Outcome: Progressing     Problem: Renal Failure  Goal: I will maintain optimum fluid volume with treatment  Outcome: Progressing     Problem: Recent hospitalization or complication while living with HTN  Goal: Patient will effectively self-manage their HTN disease process  Outcome: Progressing   The patient's goals for the shift include comfort     The clinical goals for the shift include safety    Over the shift, the patient did not make progress toward the following goals. Barriers to progression include none. Recommendations to address these barriers include none.

## 2024-08-15 NOTE — PROGRESS NOTES
Estuardo El is a 74 y.o. male on day 4 of admission presenting with Acute renal failure, unspecified acute renal failure type (CMS-HCC).      Subjective   Patient's mental status is much improved today, he has no complaints however his creatinine is unchanged at 3.3.  He is nonoliguric made 850 cc of urine yesterday.       Objective          Vitals 24HR  Heart Rate:  [76-81]   Temp:  [36.3 °C (97.3 °F)-36.5 °C (97.7 °F)]   Resp:  [15-17]   BP: (113-147)/(73-92)   Weight:  [77.7 kg (171 lb 4.8 oz)]   SpO2:  [96 %-99 %]     Intake/Output last 3 Shifts:    Intake/Output Summary (Last 24 hours) at 8/15/2024 1125  Last data filed at 8/15/2024 1107  Gross per 24 hour   Intake 750 ml   Output 1150 ml   Net -400 ml       Physical Exam  Constitutional:       General: Awake, alert and oriented x 3.  He is not in acute distress.  Cardiovascular:      No friction rub.   Pulmonary:      Effort: Pulmonary effort is normal.      Comments: Mildly diminished breath sounds  Abdominal:      General: Bowel sounds are normal.      Palpations: Abdomen is soft.      Tenderness: There is no guarding or rebound.   Musculoskeletal:      Comments: trace edema, no cyanosis     Relevant Results  Results for orders placed or performed during the hospital encounter of 08/11/24 (from the past 24 hour(s))   BLOOD GAS ARTERIAL   Result Value Ref Range    POCT pH, Arterial 7.44 (H) 7.38 - 7.42 pH    POCT pCO2, Arterial 41 38 - 42 mm Hg    POCT pO2, Arterial 80 (L) 85 - 95 mm Hg    POCT SO2, Arterial 97 94 - 100 %    POCT Oxy Hemoglobin, Arterial 94.7 94.0 - 98.0 %    POCT Base Excess, Arterial 3.3 (H) -2.0 - 3.0 mmol/L    POCT HCO3 Calculated, Arterial 27.8 (H) 22.0 - 26.0 mmol/L    Patient Temperature 37.0 degrees Celsius    FiO2 21 %    Site of Arterial Puncture Radial Right     Justin's Test Positive    CBC   Result Value Ref Range    WBC 8.4 4.4 - 11.3 x10*3/uL    nRBC 0.2 (H) 0.0 - 0.0 /100 WBCs    RBC 3.97 (L) 4.50 - 5.90 x10*6/uL     Hemoglobin 13.0 (L) 13.5 - 17.5 g/dL    Hematocrit 38.7 (L) 41.0 - 52.0 %    MCV 98 80 - 100 fL    MCH 32.7 26.0 - 34.0 pg    MCHC 33.6 32.0 - 36.0 g/dL    RDW 15.3 (H) 11.5 - 14.5 %    Platelets 113 (L) 150 - 450 x10*3/uL   Comprehensive Metabolic Panel   Result Value Ref Range    Glucose 96 65 - 99 mg/dL    Sodium 140 133 - 145 mmol/L    Potassium 4.1 3.4 - 5.1 mmol/L    Chloride 104 97 - 107 mmol/L    Bicarbonate 23 (L) 24 - 31 mmol/L    Urea Nitrogen 49 (H) 8 - 25 mg/dL    Creatinine 3.30 (H) 0.40 - 1.60 mg/dL    eGFR 19 (L) >60 mL/min/1.73m*2    Calcium 8.9 8.5 - 10.4 mg/dL    Albumin 3.5 3.5 - 5.0 g/dL    Alkaline Phosphatase 65 35 - 125 U/L    Total Protein 6.6 5.9 - 7.9 g/dL    AST 69 (H) 5 - 40 U/L    Bilirubin, Total 0.6 0.1 - 1.2 mg/dL     (H) 5 - 40 U/L    Anion Gap 13 <=19 mmol/L   Protein, Urine Random   Result Value Ref Range    Total Protein, Urine Random 60 NOT ESTABLISHED mg/dL    Creatinine, Urine Random 93.4 NOT ESTABLISHED mg/dL    T. Protein/Creatinine Ratio 0.64 mg/mg Creat            Assessment/Plan   Acute kidney injury, the differential diagnosis is broad given his complex medical history of congestive heart failure, hepatitis C, BPH, uncontrolled hypertension.  His Cr had been improving however now is fairly unchanged at 3.3.  So far the workup is showing a low C3.  Negative ANCA.  Cryoglobulins are still pending.  Significantly elevated hep C RNA, there is some concern for possible cryoglobulinemia.  Hypertensive urgency, improving, however workup is suggestive of primary aldosteronism.  CT of the adrenals noncontrast was negative however  Congestive heart failure  Hepatitis C  BPH  Metabolic acidosis  Elevated liver enzymes  Encephalopathy     Plan: Cryoglobulins pending. Aspirin being held, will continue to monitor his renal function closely and I believe he needs a renal biopsy, the earliest that it would be safe to do this is Monday morning because he received aspirin.   Discussed with the patient and he is agreeable to a biopsy.  Will plan for this on Monday.  Monitor blood pressure closely.  Avoid diuretics or IV fluids at this time.  Renin and aldosterone were suggestive of primary aldosteronism however CT of the adrenal glands without contrast was unremarkable.  Continue oral bicarbonate.  Cardiology and GI on board.  No dialysis needs, monitor closely. He will need outpatient follow-up for complex renal cyst with urology.  Neurology on board.     René Peralta MD

## 2024-08-15 NOTE — ASSESSMENT & PLAN NOTE
Suspect due to delirium from acute illness and sleep disturbance at night  Also likely has a component of cognitive impairment at baseline - per son, patient is baseline A&Ox3 but is forgetful of smaller details like days of the week and exact city/location  Neurology following  Ammonia wnl, pCO2 wnl  CT head negative  MRI brain negative  EEG ordered by neurology, follow up results  Started on trazodone 50mg nightly with significant improvement in mental status this morning  Delirium precautions

## 2024-08-15 NOTE — PROGRESS NOTES
Physical Therapy    Physical Therapy Treatment    Patient Name: Estuardo El  MRN: [id number]  Today's Date: 8/15/2024  Time Calculation  Start Time: 1304  Stop Time: 1329  Time Calculation (min): 25 min    Assessment/Plan   PT Assessment  End of Session Communication: Bedside nurse  End of Session Patient Position: Up in chair, Alarm on  PT Plan  Inpatient/Swing Bed or Outpatient: Inpatient  PT Plan  Treatment/Interventions: Bed mobility, Transfer training, Gait training, Balance training, Strengthening, Endurance training, Therapeutic exercise, Therapeutic activity  PT Plan: Ongoing PT  PT Frequency: 4 times per week  PT Discharge Recommendations: Moderate intensity level of continued care  PT Recommended Transfer Status: Assist x2  PT - OK to Discharge: Yes (with skilled physical therapy services at next level of care)      General Visit Information:   PT  Visit  PT Received On: 08/15/24  General  Family/Caregiver Present: Yes  Caregiver Feedback: Multiple family members present.  Co-Treatment: OT  Co-Treatment Reason: Partial co-tx due to limited tolerance to activity and need for 2nd skilled person for therapeutic handling during functional mobility to optimize outcomes / safety  Prior to Session Communication: Bedside nurse  Patient Position Received: Bed, 3 rail up, Alarm on  General Comment: Cleared by nursing to be seen for therapy, pt agreeable with tx, supine in bed upon arrival.    Subjective   Precautions:  Precautions  Medical Precautions: Fall precautions       Objective   Pain:  Pain Assessment  Pain Assessment: 0-10  0-10 (Numeric) Pain Score: 0 - No pain    Cognition:  Cognition  Overall Cognitive Status: Within Functional Limits     Postural Control:  Static Sitting Balance  Static Sitting-Balance Support: Bilateral upper extremity supported  Static Sitting-Level of Assistance: Minimum assistance  Static Sitting-Comment/Number of Minutes: Fair seated static balance.  Static Standing Balance  Static  Standing-Balance Support: Bilateral upper extremity supported  Static Standing-Level of Assistance: Moderate assistance  Static Standing-Comment/Number of Minutes: Poor static standing balance with bilateral UE support on walker.    Treatments:  Therapeutic Exercise  Therapeutic Exercise Performed: Yes  Therapeutic Exercise Activity 1: Bilateral ankle pumps x10  Therapeutic Exercise Activity 2: Bilateral hip flexion x10  Therapeutic Exercise Activity 3: Bilateral knee extension x10    Bed Mobility  Bed Mobility: Yes  Bed Mobility 1  Bed Mobility 1: Supine to sitting  Level of Assistance 1: Moderate assistance, +2  Bed Mobility Comments 1: Mod assist x2 for trunk/bilateral LE's during supine to sit, mod assist to scoot EOB with use of draw sheet.    Ambulation/Gait Training  Ambulation/Gait Training Performed: Yes  Ambulation/Gait Training 1  Surface 1: Level tile  Device 1: Rolling walker  Assistance 1: Moderate assistance  Quality of Gait 1: Wide base of support, Diminished heel strike, Decreased step length, Forward flexed posture  Comments/Distance (ft) 1: 5-6 steps (bed to chair) with wheeled walker, requires assist to maneuver walker, mod assist x2 for balance.    Transfers  Transfer: Yes  Transfer 1  Transfer From 1: Sit to  Transfer to 1: Stand  Technique 1: Sit to stand, Stand to sit  Transfer Device 1: Walker  Transfer Level of Assistance 1: Moderate assistance  Trials/Comments 1: Mod assist x2 for trunk up during sit to stand, cues for proper hand placement, decreased eccentric control in sitting.    Outcome Measures:  [facility] Basic Mobility  Turning from your back to your side while in a flat bed without using bedrails: A lot  Moving from lying on your back to sitting on the side of a flat bed without using bedrails: A lot  Moving to and from bed to chair (including a wheelchair): A lot  Standing up from a chair using your arms (e.g. wheelchair or bedside chair): A lot  To walk in hospital room: A  lot  Climbing 3-5 steps with railing: Total  Basic Mobility - Total Score: 11       Encounter Problems       Encounter Problems (Active)       Mobility       Bed mobility including supine to sit and sit to supine with min assist. (Progressing)       Start:  08/12/24    Expected End:  08/26/24            Transfers including sit to stand and stand to sit with min assist. (Progressing)       Start:  08/12/24    Expected End:  08/26/24            Ambulate 50 feet with rolling walker and min assist. (Progressing)       Start:  08/12/24    Expected End:  08/26/24

## 2024-08-15 NOTE — ASSESSMENT & PLAN NOTE
Renal US showing complex cyst in the inferior pole of the left kidney  Will need outpatient follow up for complex renal cyst with urology

## 2024-08-15 NOTE — PROGRESS NOTES
Occupational Therapy    OT Treatment    Patient Name: Estuardo El  MRN: [id number]  Today's Date: 8/15/2024  Time Calculation  Start Time: 1304  Stop Time: 1328  Time Calculation (min): 24 min        Assessment:  OT Assessment: Gradual progress made towards OT goals. Continue with current OT POC to increase strength, balance and functional tolerance to maximize safety and independence during ADLs.  Barriers to Discharge: Decreased caregiver support  Evaluation/Treatment Tolerance: Patient limited by fatigue  End of Session Communication: Bedside nurse  End of Session Patient Position: Up in chair, Alarm on (all needs in reach, family present in room)  OT Assessment Results: Decreased ADL status, Decreased endurance, Decreased functional mobility, Decreased IADLs  Barriers to Discharge: Decreased caregiver support  Evaluation/Treatment Tolerance: Patient limited by fatigue  Plan:  Treatment Interventions: ADL retraining, Functional transfer training, UE strengthening/ROM, Endurance training, Cognitive reorientation, Patient/family training, Equipment evaluation/education, Compensatory technique education  OT Frequency: 3 times per week  OT Discharge Recommendations: Moderate intensity level of continued care  Equipment Recommended upon Discharge: Wheeled walker  OT Recommended Transfer Status: Moderate assist, Assist of 2  OT - OK to Discharge: Yes  Treatment Interventions: ADL retraining, Functional transfer training, UE strengthening/ROM, Endurance training, Cognitive reorientation, Patient/family training, Equipment evaluation/education, Compensatory technique education    Subjective   Previous Visit Info:  OT Last Visit  OT Received On: 08/15/24  General:  General  Reason for Referral: activities of daily living, 74 year old male admit from home with weakness, fatigue, SOB, cough, chest pain, and poor oral intake  Missed Visit: Yes  Missed Visit Reason: Other (Comment) (Pt currently off floor for  EEG.)  Family/Caregiver Present: Yes  Caregiver Feedback: Multiple family members present.  Co-Treatment: PT  Co-Treatment Reason: Co-tx d/t pt heavy assist level and decreased functional tolerance. Co-tx provided this date to maximize safety and functional outcomes.  Prior to Session Communication: Bedside nurse  Patient Position Received: Bed, 3 rail up, Alarm off, not on at start of session  General Comment: Pt cleared for therapy session per nursing, cooperative this date.  Precautions:  Hearing/Visual Limitations: WFL  Medical Precautions: Fall precautions  Precautions Comment: telemetry  Vital Signs:     Pain:  Pain Assessment  Pain Assessment: 0-10  0-10 (Numeric) Pain Score: 0 - No pain  Clinical Progression: Not changed    Objective    Cognition:  Cognition  Overall Cognitive Status: Impaired  Orientation Level: Oriented X4 (forgetful)  Following Commands: Follows one step commands with repetition  Attention: Exceptions to WFL  Sustained Attention: Impaired  Safety/Judgement: Exceptions to WFL  Insight: Mild  Impulsive: Mildly  Task Initiation: Initiates with cues  Processing Speed: Delayed  Coordination:  Movements are Fluid and Coordinated: No  Upper Body Coordination: delayed rate and accuracy of movements  Lower Body Coordination: Slow rate of movement tom LE    Bed Mobility/Transfers: Bed Mobility  Bed Mobility: Yes  Bed Mobility 1  Bed Mobility 1: Supine to sitting, Scooting  Level of Assistance 1: Moderate assistance, +2  Bed Mobility Comments 1: modAx2 required for BLE off EOB and trunk elevation. HOB moderately elevated and use of bed rails required to complete. Verbal/ tactile cues required for sequencing.    Transfers  Transfer: Yes  Transfer 1  Trials/Comments 1: sit<>stands completed from standard EOB height with FWW and modAx2- verbal/ tactile cues required for proper hand placement to increase safety and decrease risk of falls. Bed>chair completed with FWW and modAx1+1 for line management        Therapy/Activity: Therapeutic Exercise  Therapeutic Exercise Performed: Yes  Therapeutic Exercise Activity 1: BUE exercises completed 1x10 with min resistance for following exercises: wrist flexion/ extension, pronation/supination, bicep curl, triceps extension, and shoulder press  Therapeutic Exercise Activity 2: Exercises completed this date to increase strength and functional tolerance for safety and ease of ADL/ADL transfer completion. Verbal instruction and demonstration provided to ensure proper muscle recruitment.        Outcome Measures:[facility] Daily Activity  Putting on and taking off regular lower body clothing: A lot  Bathing (including washing, rinsing, drying): A lot  Putting on and taking off regular upper body clothing: A lot  Toileting, which includes using toilet, bedpan or urinal: A lot  Taking care of personal grooming such as brushing teeth: A little  Eating Meals: A little  Daily Activity - Total Score: 14        Education Documentation  Precautions, taught by KRISTINA Genao at 8/15/2024  4:55 PM.  Learner: Family, Patient  Readiness: Acceptance  Method: Explanation, Demonstration  Response: Needs Reinforcement    ADL Training, taught by KRISTINA Genao at 8/15/2024  4:55 PM.  Learner: Family, Patient  Readiness: Acceptance  Method: Explanation, Demonstration  Response: Needs Reinforcement    Education Comments  Education provided on role of OT/POC, safety awareness throughout functional tasks/transfers, importance of activity/ rest routine, EC/WS techniques, and use of call light for assistance. Questions, comments and concerns addressed regarding OT.      Goals:  Encounter Problems       Encounter Problems (Active)       OT Goals       ADLs (Progressing)       Start:  08/12/24    Expected End:  08/30/24       Pt will complete ADL tasks with Mod I, using AE as needed, in order to complete self-care tasks.           Functional transfers (Progressing)       Start:  08/12/24     Expected End:  08/30/24            Functional mobility (Progressing)       Start:  08/12/24    Expected End:  08/30/24       Pt will perform functional mobility household distance at mod ind level with RW

## 2024-08-15 NOTE — ASSESSMENT & PLAN NOTE
Cardiology following  Continue amlodipine, hydralazine, and coreg  Avoiding ACEi/ARBs for now due to renal failure  Workup suggestive of primary hyperaldosteronism  CT adrenals unremarkable  Improving

## 2024-08-15 NOTE — ASSESSMENT & PLAN NOTE
Echo showing EF 40-45% with diastolic dysfunction  Avoiding ACEi/ARB and diuretics due to renal failure  Management per cardiology/nephrology

## 2024-08-16 LAB
ALBUMIN SERPL-MCNC: 3.7 G/DL (ref 3.5–5)
ALP BLD-CCNC: 66 U/L (ref 35–125)
ALT SERPL-CCNC: 137 U/L (ref 5–40)
ANION GAP SERPL CALC-SCNC: 13 MMOL/L
AST SERPL-CCNC: 52 U/L (ref 5–40)
BASOPHILS # BLD AUTO: 0.03 X10*3/UL (ref 0–0.1)
BASOPHILS NFR BLD AUTO: 0.4 %
BILIRUB SERPL-MCNC: 0.6 MG/DL (ref 0.1–1.2)
BUN SERPL-MCNC: 45 MG/DL (ref 8–25)
BURR CELLS BLD QL SMEAR: NORMAL
CALCIUM SERPL-MCNC: 9 MG/DL (ref 8.5–10.4)
CHLORIDE SERPL-SCNC: 102 MMOL/L (ref 97–107)
CO2 SERPL-SCNC: 24 MMOL/L (ref 24–31)
CREAT SERPL-MCNC: 3 MG/DL (ref 0.4–1.6)
DACRYOCYTES BLD QL SMEAR: NORMAL
EGFRCR SERPLBLD CKD-EPI 2021: 21 ML/MIN/1.73M*2
EOSINOPHIL # BLD AUTO: 0.1 X10*3/UL (ref 0–0.4)
EOSINOPHIL NFR BLD AUTO: 1.2 %
ERYTHROCYTE [DISTWIDTH] IN BLOOD BY AUTOMATED COUNT: 15.7 % (ref 11.5–14.5)
GLUCOSE BLD MANUAL STRIP-MCNC: 103 MG/DL (ref 74–99)
GLUCOSE BLD MANUAL STRIP-MCNC: 110 MG/DL (ref 74–99)
GLUCOSE BLD MANUAL STRIP-MCNC: 144 MG/DL (ref 74–99)
GLUCOSE SERPL-MCNC: 93 MG/DL (ref 65–99)
HAPTOGLOB SERPL-MCNC: 15 MG/DL (ref 37–246)
HCT VFR BLD AUTO: 39.3 % (ref 41–52)
HGB BLD-MCNC: 13 G/DL (ref 13.5–17.5)
IMM GRANULOCYTES # BLD AUTO: 0.08 X10*3/UL (ref 0–0.5)
IMM GRANULOCYTES NFR BLD AUTO: 0.9 % (ref 0–0.9)
LDH SERPL-CCNC: 337 U/L (ref 91–227)
LYMPHOCYTES # BLD AUTO: 1.38 X10*3/UL (ref 0.8–3)
LYMPHOCYTES NFR BLD AUTO: 16.3 %
MCH RBC QN AUTO: 33.2 PG (ref 26–34)
MCHC RBC AUTO-ENTMCNC: 33.1 G/DL (ref 32–36)
MCV RBC AUTO: 100 FL (ref 80–100)
MONOCYTES # BLD AUTO: 0.95 X10*3/UL (ref 0.05–0.8)
MONOCYTES NFR BLD AUTO: 11.2 %
NEUTROPHILS # BLD AUTO: 5.95 X10*3/UL (ref 1.6–5.5)
NEUTROPHILS NFR BLD AUTO: 70 %
NEUTS VAC BLD QL SMEAR: PRESENT
NRBC BLD-RTO: 0 /100 WBCS (ref 0–0)
OVALOCYTES BLD QL SMEAR: NORMAL
PATH REVIEW-CBC DIFFERENTIAL: NORMAL
PHOSPHATE SERPL-MCNC: 3.5 MG/DL (ref 2.5–4.5)
PLATELET # BLD AUTO: 121 X10*3/UL (ref 150–450)
POLYCHROMASIA BLD QL SMEAR: NORMAL
POTASSIUM SERPL-SCNC: 4.4 MMOL/L (ref 3.4–5.1)
PROT SERPL-MCNC: 6.8 G/DL (ref 5.9–7.9)
RBC # BLD AUTO: 3.92 X10*6/UL (ref 4.5–5.9)
RBC MORPH BLD: NORMAL
SCHISTOCYTES BLD QL SMEAR: NORMAL
SODIUM SERPL-SCNC: 139 MMOL/L (ref 133–145)
SPHEROCYTES BLD QL SMEAR: NORMAL
TARGETS BLD QL SMEAR: NORMAL
WBC # BLD AUTO: 8.2 X10*3/UL (ref 4.4–11.3)

## 2024-08-16 PROCEDURE — 84075 ASSAY ALKALINE PHOSPHATASE: CPT | Performed by: STUDENT IN AN ORGANIZED HEALTH CARE EDUCATION/TRAINING PROGRAM

## 2024-08-16 PROCEDURE — 84100 ASSAY OF PHOSPHORUS: CPT | Performed by: INTERNAL MEDICINE

## 2024-08-16 PROCEDURE — 2500000001 HC RX 250 WO HCPCS SELF ADMINISTERED DRUGS (ALT 637 FOR MEDICARE OP): Performed by: STUDENT IN AN ORGANIZED HEALTH CARE EDUCATION/TRAINING PROGRAM

## 2024-08-16 PROCEDURE — 85027 COMPLETE CBC AUTOMATED: CPT | Performed by: STUDENT IN AN ORGANIZED HEALTH CARE EDUCATION/TRAINING PROGRAM

## 2024-08-16 PROCEDURE — 2500000004 HC RX 250 GENERAL PHARMACY W/ HCPCS (ALT 636 FOR OP/ED): Performed by: INTERNAL MEDICINE

## 2024-08-16 PROCEDURE — 83615 LACTATE (LD) (LDH) ENZYME: CPT | Performed by: INTERNAL MEDICINE

## 2024-08-16 PROCEDURE — 83010 ASSAY OF HAPTOGLOBIN QUANT: CPT | Performed by: INTERNAL MEDICINE

## 2024-08-16 PROCEDURE — 2500000004 HC RX 250 GENERAL PHARMACY W/ HCPCS (ALT 636 FOR OP/ED): Performed by: STUDENT IN AN ORGANIZED HEALTH CARE EDUCATION/TRAINING PROGRAM

## 2024-08-16 PROCEDURE — 85025 COMPLETE CBC W/AUTO DIFF WBC: CPT | Performed by: STUDENT IN AN ORGANIZED HEALTH CARE EDUCATION/TRAINING PROGRAM

## 2024-08-16 PROCEDURE — 97110 THERAPEUTIC EXERCISES: CPT | Mod: GP | Performed by: PHYSICAL THERAPIST

## 2024-08-16 PROCEDURE — 2060000001 HC INTERMEDIATE ICU ROOM DAILY

## 2024-08-16 PROCEDURE — 36415 COLL VENOUS BLD VENIPUNCTURE: CPT | Performed by: STUDENT IN AN ORGANIZED HEALTH CARE EDUCATION/TRAINING PROGRAM

## 2024-08-16 PROCEDURE — 99232 SBSQ HOSP IP/OBS MODERATE 35: CPT

## 2024-08-16 PROCEDURE — 82947 ASSAY GLUCOSE BLOOD QUANT: CPT

## 2024-08-16 PROCEDURE — 2500000002 HC RX 250 W HCPCS SELF ADMINISTERED DRUGS (ALT 637 FOR MEDICARE OP, ALT 636 FOR OP/ED): Performed by: HOSPITALIST

## 2024-08-16 PROCEDURE — 36415 COLL VENOUS BLD VENIPUNCTURE: CPT | Performed by: INTERNAL MEDICINE

## 2024-08-16 PROCEDURE — S4991 NICOTINE PATCH NONLEGEND: HCPCS | Performed by: HOSPITALIST

## 2024-08-16 PROCEDURE — 85060 BLOOD SMEAR INTERPRETATION: CPT | Performed by: PATHOLOGY

## 2024-08-16 PROCEDURE — 97530 THERAPEUTIC ACTIVITIES: CPT | Mod: GP | Performed by: PHYSICAL THERAPIST

## 2024-08-16 PROCEDURE — 99232 SBSQ HOSP IP/OBS MODERATE 35: CPT | Performed by: STUDENT IN AN ORGANIZED HEALTH CARE EDUCATION/TRAINING PROGRAM

## 2024-08-16 RX ADMIN — HYDRALAZINE HYDROCHLORIDE 50 MG: 50 TABLET ORAL at 08:45

## 2024-08-16 RX ADMIN — CARVEDILOL 12.5 MG: 12.5 TABLET, FILM COATED ORAL at 06:10

## 2024-08-16 RX ADMIN — TRAZODONE HYDROCHLORIDE 50 MG: 50 TABLET ORAL at 21:30

## 2024-08-16 RX ADMIN — HYDRALAZINE HYDROCHLORIDE 50 MG: 50 TABLET ORAL at 15:07

## 2024-08-16 RX ADMIN — Medication 1 PATCH: at 08:46

## 2024-08-16 RX ADMIN — CARVEDILOL 12.5 MG: 12.5 TABLET, FILM COATED ORAL at 16:29

## 2024-08-16 RX ADMIN — SODIUM BICARBONATE 650 MG: 650 TABLET ORAL at 21:30

## 2024-08-16 RX ADMIN — HYDRALAZINE HYDROCHLORIDE 50 MG: 50 TABLET ORAL at 21:30

## 2024-08-16 RX ADMIN — HEPARIN SODIUM 5000 UNITS: 5000 INJECTION, SOLUTION INTRAVENOUS; SUBCUTANEOUS at 16:29

## 2024-08-16 RX ADMIN — Medication 6 MG: at 21:30

## 2024-08-16 RX ADMIN — AMLODIPINE BESYLATE 10 MG: 10 TABLET ORAL at 08:45

## 2024-08-16 RX ADMIN — HEPARIN SODIUM 5000 UNITS: 5000 INJECTION, SOLUTION INTRAVENOUS; SUBCUTANEOUS at 08:45

## 2024-08-16 RX ADMIN — SODIUM BICARBONATE 650 MG: 650 TABLET ORAL at 08:45

## 2024-08-16 RX ADMIN — HEPARIN SODIUM 5000 UNITS: 5000 INJECTION, SOLUTION INTRAVENOUS; SUBCUTANEOUS at 21:30

## 2024-08-16 RX ADMIN — ACETAMINOPHEN 650 MG: 325 TABLET ORAL at 15:19

## 2024-08-16 ASSESSMENT — COGNITIVE AND FUNCTIONAL STATUS - GENERAL
TOILETING: A LITTLE
EATING MEALS: A LITTLE
STANDING UP FROM CHAIR USING ARMS: A LITTLE
PERSONAL GROOMING: A LITTLE
TOILETING: A LITTLE
CLIMB 3 TO 5 STEPS WITH RAILING: A LITTLE
WALKING IN HOSPITAL ROOM: A LOT
DAILY ACTIVITIY SCORE: 18
DRESSING REGULAR LOWER BODY CLOTHING: A LITTLE
EATING MEALS: A LITTLE
CLIMB 3 TO 5 STEPS WITH RAILING: TOTAL
STANDING UP FROM CHAIR USING ARMS: A LITTLE
MOBILITY SCORE: 14
MOVING FROM LYING ON BACK TO SITTING ON SIDE OF FLAT BED WITH BEDRAILS: A LITTLE
MOBILITY SCORE: 18
MOVING FROM LYING ON BACK TO SITTING ON SIDE OF FLAT BED WITH BEDRAILS: A LITTLE
MOVING TO AND FROM BED TO CHAIR: A LITTLE
DAILY ACTIVITIY SCORE: 18
CLIMB 3 TO 5 STEPS WITH RAILING: TOTAL
HELP NEEDED FOR BATHING: A LITTLE
MOVING TO AND FROM BED TO CHAIR: A LITTLE
PERSONAL GROOMING: A LITTLE
TURNING FROM BACK TO SIDE WHILE IN FLAT BAD: A LITTLE
MOVING FROM LYING ON BACK TO SITTING ON SIDE OF FLAT BED WITH BEDRAILS: A LITTLE
STANDING UP FROM CHAIR USING ARMS: A LITTLE
MOBILITY SCORE: 14
HELP NEEDED FOR BATHING: A LITTLE
WALKING IN HOSPITAL ROOM: A LOT
DRESSING REGULAR UPPER BODY CLOTHING: A LITTLE
MOVING TO AND FROM BED TO CHAIR: A LITTLE
DRESSING REGULAR UPPER BODY CLOTHING: A LITTLE
DRESSING REGULAR LOWER BODY CLOTHING: A LITTLE
TURNING FROM BACK TO SIDE WHILE IN FLAT BAD: A LOT
WALKING IN HOSPITAL ROOM: A LITTLE
TURNING FROM BACK TO SIDE WHILE IN FLAT BAD: A LOT

## 2024-08-16 ASSESSMENT — PAIN SCALES - GENERAL
PAINLEVEL_OUTOF10: 0 - NO PAIN
PAINLEVEL_OUTOF10: 2
PAINLEVEL_OUTOF10: 0 - NO PAIN

## 2024-08-16 ASSESSMENT — PAIN - FUNCTIONAL ASSESSMENT
PAIN_FUNCTIONAL_ASSESSMENT: 0-10

## 2024-08-16 ASSESSMENT — PAIN DESCRIPTION - LOCATION: LOCATION: GENERALIZED

## 2024-08-16 NOTE — PROGRESS NOTES
Physical Therapy    Physical Therapy Treatment    Patient Name: Estuardo El  MRN: [id number]  Today's Date: 8/16/2024  Time Calculation  Start Time: 1340  Stop Time: 1406  Time Calculation (min): 26 min    Assessment/Plan   PT Assessment  End of Session Communication: Bedside nurse, PCT/NA/CTA  Assessment Comment: Pt made adequate progress toward his functional mobility goals. Pt required minimally increased assist during functional mobility compared to his reported baseline. Pt would benefit from continued skilled PT services for maximizing independence and safety prior to & after discharge (moderate intensity).  End of Session Patient Position: Up in chair, Alarm on (call light & needs within reach)    PT Plan  Inpatient/Swing Bed or Outpatient: Inpatient  PT Plan  Treatment/Interventions: Bed mobility, Transfer training, Gait training, Balance training, Strengthening, Endurance training, Therapeutic exercise, Therapeutic activity  PT Plan: Ongoing PT  PT Frequency: 4 times per week  PT Discharge Recommendations: Moderate intensity level of continued care  PT Recommended Transfer Status: Assist x2  PT - OK to Discharge: Yes (with skilled physical therapy services at next level of care)      General Visit Information:   PT  Visit  PT Received On: 08/16/24    General  Past Medical History Relevant to Rehab: remote ETOH, hep C, CHF, HTN, back surgery  Prior to Session Communication: Bedside nurse  Patient Position Received: Bed, 3 rail up, Alarm on  General Comment: RN cleared pt for participation. Pt confused and minimally verbal. He did not state agreement to session but participated fully.    Subjective   Precautions:  Medical Precautions: Fall precautions  Precautions Comment: (+)telemetry      Objective   Pain:  Pain Assessment: 0-10  0-10 (Numeric) Pain Score: 0 - No pain    Cognition:  Overall Cognitive Status: Impaired    Arousal/Alertness:  (Appeared lethargic; noted decreased eye contact throughout  "session)    Orientation Level: Disoriented to place, Disoriented to time (not able to state his : \"I don't know.\" Therapist oriented him to hospital, full date, day of week & current time.)    Following Commands: Follows one step commands with repetition    Impulsive:  (Not this session)    Coordination:  Movements are Fluid and Coordinated: No (slowed rate of all movements)    Postural Control:  Within Functional Limits    Static Sitting Balance  (BUE/BLE supported: distant S)    Dynamic Sitting Balance  (BUE & unilateral LE supported: CGA)    Static Standing Balance  (BUE supported at FWW: CGA)    Dynamic Standing Balance  (BUE supported at FWW: minAx1 for walker management & guarding at trunk)       Activity Tolerance:  Endurance: Tolerates 10 - 20 min exercise/activity with multiple rests    Treatments:  Therapeutic Exercise BLE; cued as needed for proper form to obtain maximal benefits.  Seated EOB:  -DF/PF x5 then stopped & declined to perform all 10 reps  -knee ext AAROM for increased range x10    Supported sitting:  -hip flex (march) AAROM x10  -hip abd x10  -isometric hip add (3 sec hold) x10    Bed Mobility  Bed Mobility 1: Supine to sitting  Level of Assistance 1: Minimum assistance (to BLE (mainly LLE) for continued movement. Consistent VCs for sequencing)  Bed Mobility Comments 1: HOB elevated </=40°; toward L side    Bed Mobility  2: Scooting (fwd while seated EOB)  Level of Assistance 2: Close supervision (using BUE. Verbally cued for continued effort to attain start position for sit>stand)    Transfers  Technique 1: Sit to stand  Transfer Device 1:  (FWW & EOB)  Transfer Level of Assistance 1: Minimum assistance (for BUE hand placement. Moderate verbal/tactile cues for same. Also cued for trunk rocking for momentum. No assist for anterior weight shift.)  Trials/Comments 1:  (x1 trial)  Transfers 2  Technique 2: Stand to sit  Transfer Device 2:  (FWW & armrests)  Transfer Level of Assistance 2: " Contact guard (Verbal & aural cues for walker safety/BUE placement. Excellent eccentric control.)  Trials/Comments 2: x1 trial    Ambulation/Gait Training  Surface 1: Level tile  Device 1: Rolling walker  Assistance 1: Minimum assistance (for walker management. Cued for same & walker/BLE sequencing during retro stepping.)  Quality of Gait 1: Forward flexed posture (narrower EDMUND; decreased bilateral DF in swing; decreased bilateral step length; slow velocity with discontinuous movement)  Comments/Distance (ft) 1: Few steps of bed>bedside chair stand pivot then immediately 4 steps fwd/retro. No threat for LOB. Pt reported need for seated rest & declined further trials without giving a reason despite repeated questioning.      Outcome Measures:  [facility] Basic Mobility  Turning from your back to your side while in a flat bed without using bedrails: A little  Moving from lying on your back to sitting on the side of a flat bed without using bedrails: A lot  Moving to and from bed to chair (including a wheelchair): A little  Standing up from a chair using your arms (e.g. wheelchair or bedside chair): A little  To walk in hospital room: A lot  Climbing 3-5 steps with railing: Total  Basic Mobility - Total Score: 14    Education Documentation  Mobility Training, taught by Santa Healy PT at 8/16/2024  3:23 PM.  Learner: Patient  Readiness: Acceptance  Method: Explanation  Response: Needs Reinforcement    Education Comments  No comments found.         Encounter Problems       Encounter Problems (Active)       Mobility       Bed mobility including supine to sit and sit to supine with min assist. (Progressing)       Start:  08/12/24    Expected End:  08/26/24            Transfers including sit to stand and stand to sit with min assist. (Progressing)       Start:  08/12/24    Expected End:  08/26/24            Ambulate 50 feet with rolling walker and min assist. (Progressing)       Start:  08/12/24    Expected End:  08/26/24

## 2024-08-16 NOTE — HOSPITAL COURSE
74yoM hx of HTN, BPH, hepatitis C, diastolic heart failure who presented with worsening shortness of breath and generalized weakness. In the ED, initial vitals showing significantly elevated blood pressures with SBP in the 200s. Labs significant for acute renal failure (initial creatinine 4.10), elevated troponin, and elevated liver enzymes. Cardiology, nephrology, and GI were consulted. Patient was started on antihypertensives for gradual reduction in blood pressure by cardiology. 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 CT adrenal glands negative.   Echo showing reduced EF of 40-45% with diastolic dysfunction. This is a new diagnosis of systolic heart failure in addition to known diastolic heart failure. Unable to fully initiate GDMT/start ACEi/ARB or diuretic due to renal failure. Patient was noted to have significantly elevated Hep C viral load of 592,000 which is likely the cause of elevated liver enzymes. Patient also reported hx of prior EtOH use which is probably also contributing. Liver US showing hepatic steatosis/steatohepatitis. Unclear etiology of renal failure. Given high Hep C viral load there is some concern for possible cryoglobulinemia. Cryoglobulin level pending ***. Renal ultrasound showing complex renal cyst in the inferior pole of the left kidney. Nephrology recommended renal biopsy which was done by IR on 8/19/24***.      PT/OT recommending moderate intensity rehab at discharge. Patient agreeable to SNF placement. Cleared for discharge by nephrology, cardiology, and GI. Patient will need to follow up with Dr. Mcgraw for treatment/management of Hep C and ongoing liver surveillance. Follow up renal biopsy results with Dr. Peralta on ***. Follow up with Dr. Campbell in 1-2 weeks.   
Yes

## 2024-08-16 NOTE — ASSESSMENT & PLAN NOTE
Nephrology following  Per nephrology, may need renal biopsy - aspirin held  Improving, creatinine appears to be plateauing around 3.30  Ethylene glycol and volatile compound panel negative  Follow up cryoglobulins  Per nephrology, will need renal biopsy on Monday 8/19

## 2024-08-16 NOTE — PROGRESS NOTES
"Estuardo El is a 74 y.o. male on day 5 of admission presenting with Acute renal failure, unspecified acute renal failure type (CMS-HCC).    Subjective   Patient resting comfortably in bed at time of exam. Denies chest pain, pressure or palpitations.        Objective     Physical Exam  Cardiovascular:      Rate and Rhythm: Normal rate and regular rhythm.      Heart sounds: No murmur heard.     No friction rub. No gallop.   Pulmonary:      Effort: Pulmonary effort is normal.      Breath sounds: Normal breath sounds. No wheezing, rhonchi or rales.   Abdominal:      General: Bowel sounds are normal.   Musculoskeletal:      Right lower leg: No edema.      Left lower leg: No edema.   Skin:     General: Skin is warm and dry.      Capillary Refill: Capillary refill takes less than 2 seconds.   Neurological:      Mental Status: He is alert and oriented to person, place, and time.   Psychiatric:         Mood and Affect: Mood normal.         Behavior: Behavior normal.         Last Recorded Vitals  Blood pressure (!) 148/96, pulse 80, temperature 36.4 °C (97.5 °F), temperature source Temporal, resp. rate 15, height 1.778 m (5' 10\"), weight 82.7 kg (182 lb 5.1 oz), SpO2 98%.  Intake/Output last 3 Shifts:  I/O last 3 completed shifts:  In: 675 (8.2 mL/kg) [P.O.:675]  Out: 1925 (23.3 mL/kg) [Urine:1925 (0.6 mL/kg/hr)]  Weight: 82.7 kg     Relevant Results  Results for orders placed or performed during the hospital encounter of 08/11/24 (from the past 24 hour(s))   CBC   Result Value Ref Range    WBC 8.2 4.4 - 11.3 x10*3/uL    nRBC 0.0 0.0 - 0.0 /100 WBCs    RBC 3.92 (L) 4.50 - 5.90 x10*6/uL    Hemoglobin 13.0 (L) 13.5 - 17.5 g/dL    Hematocrit 39.3 (L) 41.0 - 52.0 %     80 - 100 fL    MCH 33.2 26.0 - 34.0 pg    MCHC 33.1 32.0 - 36.0 g/dL    RDW 15.7 (H) 11.5 - 14.5 %    Platelets 121 (L) 150 - 450 x10*3/uL   Comprehensive Metabolic Panel   Result Value Ref Range    Glucose 93 65 - 99 mg/dL    Sodium 139 133 - 145 mmol/L "    Potassium 4.4 3.4 - 5.1 mmol/L    Chloride 102 97 - 107 mmol/L    Bicarbonate 24 24 - 31 mmol/L    Urea Nitrogen 45 (H) 8 - 25 mg/dL    Creatinine 3.00 (H) 0.40 - 1.60 mg/dL    eGFR 21 (L) >60 mL/min/1.73m*2    Calcium 9.0 8.5 - 10.4 mg/dL    Albumin 3.7 3.5 - 5.0 g/dL    Alkaline Phosphatase 66 35 - 125 U/L    Total Protein 6.8 5.9 - 7.9 g/dL    AST 52 (H) 5 - 40 U/L    Bilirubin, Total 0.6 0.1 - 1.2 mg/dL     (H) 5 - 40 U/L    Anion Gap 13 <=19 mmol/L   POCT GLUCOSE   Result Value Ref Range    POCT Glucose 103 (H) 74 - 99 mg/dL                     Assessment/Plan   Assessment & Plan  Acute renal failure, unspecified acute renal failure type (CMS-HCC)    Acute metabolic encephalopathy    Hypertensive emergency    Chronic combined systolic and diastolic heart failure (Multi)    Chronic hepatitis C without hepatic coma (Multi)    Elevated liver enzymes    Tobacco use    Hypokalemia    Metabolic acidosis    Hypertensive urgency    Renal cyst    Hypertensive emergency  Acute kidney injury  Elevated high sensitive troponin  Generalized weakness  Tobacco use  Chronic diastolic heart failure     Overall impression:     8/11: Consulted for congestive heart failure and NSTEMI.  The patient's symptoms are generalized weakness and fatigue.  Reports no chest pain and some mild shortness of breath/fatigue over the past few weeks.  Chest x-ray is clear.  There is no evidence of fluid volume overload on auscultation.  There is no JVD.  There is no peripheral edema.  Despite this his proBNP is 70,000.  Additionally he has noted to have an acute kidney injury with a creatinine of 4.1 which is new for this patient.  Upon further review the patient has severely hypertensive with a systolic averaging over 200.  There is a history of hypertension, however the patient does not take medications in the outpatient setting.  It appears the patient is having a hypertensive emergency resulting in elevated proBNP and elevated  high-sensitivity troponin; and perhaps hypertensive cardiomyopathy.  Certainly there is also suspicion given the elevated blood pressure of renal artery stenosis.  Will check echocardiogram.  Renal artery stenosis study has been ordered.  At this point given lack of chest pain symptoms we will stop heparin drip.  Agree with amlodipine as first agent.  Would avoid ACEs and ARB's.  Agree with carvedilol.  I have added hydralazine for further blood pressure management.  Patient is significantly decompensated from a cardiac perspective given his hypertensive emergency, and a high risk for further decompensation.  Will follow with you.      8/12: Improved over the past 24 hours.  Patient states he feels significantly improved less anxiety and no longer has a feeling that he is dying.  On telemetry monitor remained in a sinus rhythm with occasional PVCs.  His blood pressure significantly improved with most recent of 138/118.  Creatinine is slowly improving with most recent at 3.9.  Hemoglobin stable at 13.0 he has mild peripheral edema this morning, however his lungs are clear on auscultation.  He is breathing constantly on room air.  He is chest pain-free.  He will go for an echocardiogram this morning which will further define his cardiac status.  At this point he continues on a new combination of amlodipine, carvedilol, and hydralazine.  At this point we continue to avoid ACE or ARB's secondary to acute kidney injury.  Nephrology is following.  They note the patient may require a renal biopsy.  His aspirin has been placed on hold for this.  Overall is improving.  Will follow with you.     8/13: Continues to improve.  Blood pressure improved with most recent of 143/107.  Creatinine significantly improved down to 3.4.  Hemoglobin stable at 13.0.  Echocardiogram yesterday confirms previous diagnosis of diastolic dysfunction but reveals a new reduced ejection fraction of 40 to 45%.  This however is a global hypokinesis and  does not reveal significant wall motion abnormalities.  Patient does continue on carvedilol as well as amlodipine and hydralazine, however we are unable to initiate a ACE or an ARB at this time.  Believe the patient would benefit at some time from a nuclear stress test, however this does not need to be completed during this hospitalization.  At this point we will continue to focus on blood pressure management as well as improving kidney function which does continue to significantly improved.  The patient did note to me this morning that he has poor recall of our discussions yesterday and the day before that.  He does appear significantly fatigued on assessment today.  Overall improving.  Euvolemic on exam today.  Chest pain-free.  Will follow with you.     8/14: Stable overnight.  No significant changes.  Breathing comfortably on room air.  Chest pain-free.  Euvolemic.  Blood pressure was very well-controlled yesterday evening but did rise by this morning.  Earlier this morning his blood pressure with systolic over 160, more recently systolic now in the 140s.  His blood pressure is somewhat labile, yesterday evening systolic in the low 100s.  At this point we will continue with current medication strategy.  His creatinine continues to improve with most recent of 3.2.  I have discussed with the patient his plans for discharge, he states he is treated very well here and does not wish to be discharged, however I informed him that he is in an acute hospital and will be discharged at some point.  I question his home situation.  He is planned to discharge to rehab at time of discharge.  Will follow with you.    8/16: As described above.  Patient resting comfortably in bed.  No complaints of chest pain, pressure or palpitations overnight.  He is breathing comfortably on room air with pulse oximetry of 98%.  Blood pressure is somewhat elevated this morning at 148/96, but that was prior to receiving ordered  antihypertensives.  Remains in sinus rhythm on telemetry without significant ectopy.  Labs this morning revealed improvement in creatinine to 3.0.  Sodium 139, potassium 4.4, hemoglobin of 13.0.  At this time would continue patient on beta-blocker as part of guideline directed medical therapy for his newly reduced ejection fraction.  Unfortunately unable to start an ACE or an ARB at this time given the patient's acute kidney injury.  Plan for renal biopsy on Monday (8/19) per nephrology.  Otherwise no further recommendations from the cardiac perspective.  Patient can follow-up with Dr. Campbell in office 1 to 2 weeks after discharge.  Patient to discharge to nursing facility after kidney biopsy is completed.      Britni Bangura, APRN-CNP

## 2024-08-16 NOTE — PROGRESS NOTES
Occupational Therapy                 Therapy Communication Note    Patient Name: Estuardo El  MRN: [id number]  Today's Date: 8/16/2024     Discipline: Occupational Therapy    Missed Visit Reason: Missed Visit Reason: Patient refused (Pt adamantly declining participation in therapy session. Education and encouragement provided however pt continued to decline becoming increasingly agitated. RN aware.)    Missed Time: Cancel at 1126    Comment:

## 2024-08-16 NOTE — PROGRESS NOTES
Estuardo El is a 74 y.o. male on day 5 of admission presenting with Acute renal failure, unspecified acute renal failure type (CMS-HCC).      Subjective   Patient states he feels well this morning. Denies any complaints. Tolerating PO. Had BM yesterday and is feeling a lot better today.        Objective     Last Recorded Vitals  BP (!) 148/96   Pulse 80   Temp 36.4 °C (97.5 °F) (Temporal)   Resp 15   Wt 82.7 kg (182 lb 5.1 oz)   SpO2 98%   Intake/Output last 3 Shifts:    Intake/Output Summary (Last 24 hours) at 8/16/2024 0853  Last data filed at 8/16/2024 0400  Gross per 24 hour   Intake 550 ml   Output 1325 ml   Net -775 ml       Admission Weight  Weight: 92.5 kg (203 lb 14.8 oz) (08/12/24 0047)    Daily Weight  08/16/24 : 82.7 kg (182 lb 5.1 oz)    Image Results  EEG  IMPRESSION    This routine EEG is normal in awake and drowsy state. No epileptiform discharges or lateralizing signs are seen.    A full report will be scanned into the patient's chart at a later time.    This report has been interpreted and electronically signed by  MR brain wo IV contrast  Narrative: Interpreted By:  Cain Shah and Meyers Emily   STUDY:  MR BRAIN WO IV CONTRAST;  8/14/2024 8:57 pm      INDICATION:  Signs/Symptoms:encephalopathy.      COMPARISON:  CT head without contrast 08/13/2024      ACCESSION NUMBER(S):  [id number]      ORDERING CLINICIAN:  ITZEL ELMORE      TECHNIQUE:  Axial T2, FLAIR, DWI, gradient echo T2 and sagittal and coronal T1  weighted images of brain were acquired.      FINDINGS:  Please note, several sequences are degraded secondary to motion  artifact.      There is no diffusion restriction abnormality to suggest acute  infarct. No abnormal susceptibility artifact to indicate  microhemorrhage.      Moderate generalized parenchymal volume loss. Moderate burden  periventricular and subcortical white matter T2/FLAIR  hyperintensities, which likely reflect a sequelae of chronic  small-vessel ischemic  change.      Ventricles, sulci, and basal cisterns are age concordant.      There is no mass effect or midline shift.      The visualized paranasal sinuses clear. Small left mastoid air cell  effusion. Right mastoid air cells are clear.      Impression: 1. Partially limited examination secondary to motion artifact.  However, within these constraints, There is no evidence of acute  hemorrhage, mass lesion or acute infarction.  2. moderate generalized parenchymal volume loss and sequelae of  chronic small-vessel ischemic change.  3. Small left mastoid air cell effusion.      I personally reviewed the images/study, and I agree with the findings  as stated above. This study was interpreted at [place], [place].      MACRO:  None      Signed by: Cain Shah 8/15/2024 11:46 AM  Dictation workstation:   [id number]      Physical Exam  Constitutional:       General: He is not in acute distress.     Appearance: He is not toxic-appearing.   HENT:      Head: Normocephalic.      Mouth/Throat:      Pharynx: Oropharynx is clear.   Eyes:      General: No scleral icterus.  Cardiovascular:      Rate and Rhythm: Normal rate.   Pulmonary:      Effort: No respiratory distress.      Breath sounds: No wheezing.   Abdominal:      General: There is no distension.      Palpations: Abdomen is soft.   Musculoskeletal:      Right lower leg: No edema.      Left lower leg: No edema.   Neurological:      Mental Status: He is alert.   Psychiatric:         Behavior: Behavior normal.         Relevant Results               Assessment/Plan                  Assessment & Plan  Acute renal failure, unspecified acute renal failure type (CMS-HCC)  Nephrology following  Per nephrology, may need renal biopsy - aspirin held  Improving, creatinine appears to be plateauing around 3.30  Ethylene glycol and volatile compound panel negative  Follow up cryoglobulins  Per nephrology, will need renal biopsy on  Monday 8/19  Metabolic acidosis  Resolved  Continue PO bicarb  Acute metabolic encephalopathy  Suspect due to delirium from acute illness and sleep disturbance at night  Also likely has a component of cognitive impairment at baseline - per son, patient is baseline A&Ox3 but is forgetful of smaller details like days of the week and exact city/location  Neurology following  Ammonia wnl, pCO2 wnl  CT head negative  MRI brain negative  EEG wnl  Continue trazodone nightly for sleep  Delirium precautions  Hypertensive urgency  Cardiology following  Continue amlodipine, hydralazine, and coreg  Avoiding ACEi/ARBs for now due to renal failure  Workup suggestive of primary hyperaldosteronism  CT adrenals unremarkable  Improving   Renal cyst  Renal US showing complex cyst in the inferior pole of the left kidney  Will need outpatient follow up for complex renal cyst with urology   Chronic combined systolic and diastolic heart failure (Multi)  Echo showing EF 40-45% with diastolic dysfunction  Avoiding ACEi/ARB and diuretics due to renal failure  Management per cardiology/nephrology   Chronic hepatitis C without hepatic coma (Multi)  Evaluated by GI  Hep C viral load 592,000  Will need outpatient follow up with Dr. Mcgraw for Hep C treatment and liver surveillance   Elevated liver enzymes  Evaluated by GI - predominately hepatocellular  Has hx of EtOH use and Hep C  Improving   Tobacco use  Nicotine patch  Recommend cessation  Hypokalemia  Replace PRN  Resolved     Plan:  Renal function improved from 3.30 yesterday to 3.00 today  Will need renal biopsy per nephrology, planned for Monday 8/19, continue to hold aspirin  PT/OT - moderate intensity, patient agreeable to SNF placement  Anticipate discharge to SNF when medically cleared, likely sometime next week after renal biopsy               Kayla Boyd MD

## 2024-08-16 NOTE — ASSESSMENT & PLAN NOTE
Suspect due to delirium from acute illness and sleep disturbance at night  Also likely has a component of cognitive impairment at baseline - per son, patient is baseline A&Ox3 but is forgetful of smaller details like days of the week and exact city/location  Neurology following  Ammonia wnl, pCO2 wnl  CT head negative  MRI brain negative  EEG wnl  Continue trazodone nightly for sleep  Delirium precautions

## 2024-08-16 NOTE — CARE PLAN
Pt. To be turned and repositioned [id number], completing hourly safety checks. Pt. Very flat and fatigued, not really receptive to education just says ya, then closes eyes.   Problem: Skin  Goal: Decreased wound size/increased tissue granulation at next dressing change  Outcome: Progressing  Flowsheets (Taken 8/15/2024 2148)  Decreased wound size/increased tissue granulation at next dressing change: Promote sleep for wound healing  Goal: Participates in plan/prevention/treatment measures  Outcome: Progressing  Flowsheets (Taken 8/15/2024 2148)  Participates in plan/prevention/treatment measures: Elevate heels  Goal: Prevent/manage excess moisture  Outcome: Progressing  Flowsheets (Taken 8/15/2024 2148)  Prevent/manage excess moisture: Cleanse incontinence/protect with barrier cream

## 2024-08-16 NOTE — PROGRESS NOTES
08/16/24 0834   Discharge Planning   Expected Discharge Disposition SNF  ([facility])     Patient to have procedure with nephrology on Monday   Patient not medically clear.    Precert needed upon discharge       MD WILL NEED TO SIGN/CERTIFY GOLDENROD AT THE TIME OF DISCHARGE FOR SNF.     ** do not discharge without speaking to care coordination**

## 2024-08-16 NOTE — PROGRESS NOTES
Estuardo El is a 74 y.o. male on day 5 of admission presenting with Acute renal failure, unspecified acute renal failure type (CMS-HCC).      Subjective   This morning patient was sleepy would wake up to talk but said he doesn't know where he is or what the year is. His Cr is slightly improved but remains elevated at 3. He is non-oliguric.        Objective          Vitals 24HR  Heart Rate:  [72-81]   Temp:  [36.4 °C (97.5 °F)-37 °C (98.6 °F)]   Resp:  [14-16]   BP: (122-154)/(70-96)   Weight:  [82.7 kg (182 lb 5.1 oz)]   SpO2:  [96 %-98 %]       Intake/Output last 3 Shifts:    Intake/Output Summary (Last 24 hours) at 8/16/2024 1346  Last data filed at 8/16/2024 0929  Gross per 24 hour   Intake 395 ml   Output 900 ml   Net -505 ml       Physical Exam  Constitutional:       General: Sleeping but wakes up, seems to be confused today.  He is not in acute distress.  Cardiovascular:      No friction rub.   Pulmonary:      Effort: Pulmonary effort is normal.      Comments: Mildly diminished breath sounds  Abdominal:      General: Bowel sounds are normal.      Palpations: Abdomen is soft.      Tenderness: There is no guarding or rebound.   Musculoskeletal:      Comments: No edema, no cyanosis     Relevant Results  Results for orders placed or performed during the hospital encounter of 08/11/24 (from the past 24 hour(s))   CBC   Result Value Ref Range    WBC 8.2 4.4 - 11.3 x10*3/uL    nRBC 0.0 0.0 - 0.0 /100 WBCs    RBC 3.92 (L) 4.50 - 5.90 x10*6/uL    Hemoglobin 13.0 (L) 13.5 - 17.5 g/dL    Hematocrit 39.3 (L) 41.0 - 52.0 %     80 - 100 fL    MCH 33.2 26.0 - 34.0 pg    MCHC 33.1 32.0 - 36.0 g/dL    RDW 15.7 (H) 11.5 - 14.5 %    Platelets 121 (L) 150 - 450 x10*3/uL   Comprehensive Metabolic Panel   Result Value Ref Range    Glucose 93 65 - 99 mg/dL    Sodium 139 133 - 145 mmol/L    Potassium 4.4 3.4 - 5.1 mmol/L    Chloride 102 97 - 107 mmol/L    Bicarbonate 24 24 - 31 mmol/L    Urea Nitrogen 45 (H) 8 - 25 mg/dL     Creatinine 3.00 (H) 0.40 - 1.60 mg/dL    eGFR 21 (L) >60 mL/min/1.73m*2    Calcium 9.0 8.5 - 10.4 mg/dL    Albumin 3.7 3.5 - 5.0 g/dL    Alkaline Phosphatase 66 35 - 125 U/L    Total Protein 6.8 5.9 - 7.9 g/dL    AST 52 (H) 5 - 40 U/L    Bilirubin, Total 0.6 0.1 - 1.2 mg/dL     (H) 5 - 40 U/L    Anion Gap 13 <=19 mmol/L   Phosphorus   Result Value Ref Range    Phosphorus 3.5 2.5 - 4.5 mg/dL   Lactate Dehydrogenase   Result Value Ref Range     (H) 91 - 227 U/L   POCT GLUCOSE   Result Value Ref Range    POCT Glucose 103 (H) 74 - 99 mg/dL   Haptoglobin   Result Value Ref Range    Haptoglobin 15 (L) 37 - 246 mg/dL            Assessment/Plan   Acute kidney injury, the differential diagnosis is broad given his complex medical history of congestive heart failure, hepatitis C, BPH, uncontrolled hypertension.  So far the workup is showing a low C3.  Negative ANCA.  Cryoglobulins are still pending.  Significantly elevated hep C RNA, there is some concern for possible cryoglobulinemia. Platelets mildly low, hapto mildly low, with low suspicion for thrombotic microangiopathy but working up  Hypertensive urgency, improving, however workup is suggestive of primary aldosteronism.  CT of the adrenals noncontrast was negative however  Congestive heart failure  Hepatitis C  BPH  Metabolic acidosis  Elevated liver enzymes  Encephalopathy     Plan: Cryoglobulins pending. Added on peripheral smear review to evaluate for schistocytes although low suspicion for TMA. Aspirin being held, will continue to monitor his renal function closely and I believe he needs a renal biopsy, the earliest that it would be safe to do this is Monday morning because he received aspirin.  Will plan for biopsy Monday.  Monitor blood pressure closely.  Avoid diuretics or IV fluids at this time.  Continue oral bicarbonate.  Cardiology and GI on board.  No dialysis needs, monitor closely. He will need outpatient follow-up for complex renal cyst with  urology.  Neurology on board.     René Peralta MD

## 2024-08-16 NOTE — SIGNIFICANT EVENT
EEG results were reviewed.    EEG was normal awake and drowsy waveforms. MRI did not show any cortical structural lesion. Thus patient's overall risk of having seizures is considered low.      Thank you for the consult. Will sign off at this point. Please do not hesitate to reach out with any questions.      Angélica Parada MD   Vascular Neurologist

## 2024-08-17 LAB
ALBUMIN SERPL-MCNC: 3.3 G/DL (ref 3.5–5)
ALP BLD-CCNC: 60 U/L (ref 35–125)
ALT SERPL-CCNC: 96 U/L (ref 5–40)
ANION GAP SERPL CALC-SCNC: 10 MMOL/L
AST SERPL-CCNC: 42 U/L (ref 5–40)
BILIRUB SERPL-MCNC: 0.5 MG/DL (ref 0.1–1.2)
BUN SERPL-MCNC: 44 MG/DL (ref 8–25)
CALCIUM SERPL-MCNC: 8.9 MG/DL (ref 8.5–10.4)
CHLORIDE SERPL-SCNC: 105 MMOL/L (ref 97–107)
CO2 SERPL-SCNC: 25 MMOL/L (ref 24–31)
CREAT SERPL-MCNC: 2.9 MG/DL (ref 0.4–1.6)
EGFRCR SERPLBLD CKD-EPI 2021: 22 ML/MIN/1.73M*2
ERYTHROCYTE [DISTWIDTH] IN BLOOD BY AUTOMATED COUNT: 15.4 % (ref 11.5–14.5)
GLUCOSE SERPL-MCNC: 89 MG/DL (ref 65–99)
HCT VFR BLD AUTO: 36.7 % (ref 41–52)
HGB BLD-MCNC: 12.3 G/DL (ref 13.5–17.5)
MCH RBC QN AUTO: 32.8 PG (ref 26–34)
MCHC RBC AUTO-ENTMCNC: 33.5 G/DL (ref 32–36)
MCV RBC AUTO: 98 FL (ref 80–100)
NRBC BLD-RTO: 0 /100 WBCS (ref 0–0)
PLATELET # BLD AUTO: 112 X10*3/UL (ref 150–450)
POTASSIUM SERPL-SCNC: 4.7 MMOL/L (ref 3.4–5.1)
PROT SERPL-MCNC: 6.4 G/DL (ref 5.9–7.9)
RBC # BLD AUTO: 3.75 X10*6/UL (ref 4.5–5.9)
SODIUM SERPL-SCNC: 140 MMOL/L (ref 133–145)
WBC # BLD AUTO: 8.7 X10*3/UL (ref 4.4–11.3)

## 2024-08-17 PROCEDURE — 36415 COLL VENOUS BLD VENIPUNCTURE: CPT | Performed by: STUDENT IN AN ORGANIZED HEALTH CARE EDUCATION/TRAINING PROGRAM

## 2024-08-17 PROCEDURE — 2500000004 HC RX 250 GENERAL PHARMACY W/ HCPCS (ALT 636 FOR OP/ED): Performed by: INTERNAL MEDICINE

## 2024-08-17 PROCEDURE — 2060000001 HC INTERMEDIATE ICU ROOM DAILY

## 2024-08-17 PROCEDURE — 2500000001 HC RX 250 WO HCPCS SELF ADMINISTERED DRUGS (ALT 637 FOR MEDICARE OP): Performed by: STUDENT IN AN ORGANIZED HEALTH CARE EDUCATION/TRAINING PROGRAM

## 2024-08-17 PROCEDURE — S4991 NICOTINE PATCH NONLEGEND: HCPCS | Performed by: HOSPITALIST

## 2024-08-17 PROCEDURE — 80053 COMPREHEN METABOLIC PANEL: CPT | Performed by: STUDENT IN AN ORGANIZED HEALTH CARE EDUCATION/TRAINING PROGRAM

## 2024-08-17 PROCEDURE — 85027 COMPLETE CBC AUTOMATED: CPT | Performed by: STUDENT IN AN ORGANIZED HEALTH CARE EDUCATION/TRAINING PROGRAM

## 2024-08-17 PROCEDURE — 2500000002 HC RX 250 W HCPCS SELF ADMINISTERED DRUGS (ALT 637 FOR MEDICARE OP, ALT 636 FOR OP/ED): Performed by: HOSPITALIST

## 2024-08-17 PROCEDURE — 99232 SBSQ HOSP IP/OBS MODERATE 35: CPT | Performed by: STUDENT IN AN ORGANIZED HEALTH CARE EDUCATION/TRAINING PROGRAM

## 2024-08-17 PROCEDURE — 2500000004 HC RX 250 GENERAL PHARMACY W/ HCPCS (ALT 636 FOR OP/ED): Performed by: STUDENT IN AN ORGANIZED HEALTH CARE EDUCATION/TRAINING PROGRAM

## 2024-08-17 RX ADMIN — SODIUM BICARBONATE 650 MG: 650 TABLET ORAL at 09:43

## 2024-08-17 RX ADMIN — HYDRALAZINE HYDROCHLORIDE 50 MG: 50 TABLET ORAL at 09:43

## 2024-08-17 RX ADMIN — HYDRALAZINE HYDROCHLORIDE 50 MG: 50 TABLET ORAL at 14:39

## 2024-08-17 RX ADMIN — AMLODIPINE BESYLATE 10 MG: 10 TABLET ORAL at 09:43

## 2024-08-17 RX ADMIN — Medication 1 PATCH: at 09:42

## 2024-08-17 RX ADMIN — CARVEDILOL 12.5 MG: 12.5 TABLET, FILM COATED ORAL at 05:52

## 2024-08-17 RX ADMIN — HEPARIN SODIUM 5000 UNITS: 5000 INJECTION, SOLUTION INTRAVENOUS; SUBCUTANEOUS at 21:38

## 2024-08-17 RX ADMIN — POLYETHYLENE GLYCOL 3350 17 G: 17 POWDER, FOR SOLUTION ORAL at 09:43

## 2024-08-17 RX ADMIN — HEPARIN SODIUM 5000 UNITS: 5000 INJECTION, SOLUTION INTRAVENOUS; SUBCUTANEOUS at 14:39

## 2024-08-17 RX ADMIN — CARVEDILOL 12.5 MG: 12.5 TABLET, FILM COATED ORAL at 17:11

## 2024-08-17 RX ADMIN — HEPARIN SODIUM 5000 UNITS: 5000 INJECTION, SOLUTION INTRAVENOUS; SUBCUTANEOUS at 05:52

## 2024-08-17 ASSESSMENT — COGNITIVE AND FUNCTIONAL STATUS - GENERAL
TOILETING: A LITTLE
TURNING FROM BACK TO SIDE WHILE IN FLAT BAD: A LOT
MOVING FROM LYING ON BACK TO SITTING ON SIDE OF FLAT BED WITH BEDRAILS: A LITTLE
HELP NEEDED FOR BATHING: A LITTLE
WALKING IN HOSPITAL ROOM: A LOT
DRESSING REGULAR UPPER BODY CLOTHING: A LITTLE
DAILY ACTIVITIY SCORE: 18
MOBILITY SCORE: 14
STANDING UP FROM CHAIR USING ARMS: A LITTLE
DAILY ACTIVITIY SCORE: 18
STANDING UP FROM CHAIR USING ARMS: A LITTLE
DRESSING REGULAR UPPER BODY CLOTHING: A LITTLE
DRESSING REGULAR LOWER BODY CLOTHING: A LITTLE
MOVING TO AND FROM BED TO CHAIR: A LITTLE
CLIMB 3 TO 5 STEPS WITH RAILING: TOTAL
WALKING IN HOSPITAL ROOM: A LOT
CLIMB 3 TO 5 STEPS WITH RAILING: TOTAL
DRESSING REGULAR LOWER BODY CLOTHING: A LITTLE
MOVING TO AND FROM BED TO CHAIR: A LITTLE
PERSONAL GROOMING: A LITTLE
TOILETING: A LITTLE
TURNING FROM BACK TO SIDE WHILE IN FLAT BAD: A LOT
MOVING FROM LYING ON BACK TO SITTING ON SIDE OF FLAT BED WITH BEDRAILS: A LITTLE
HELP NEEDED FOR BATHING: A LITTLE
MOBILITY SCORE: 14
EATING MEALS: A LITTLE
EATING MEALS: A LITTLE
PERSONAL GROOMING: A LITTLE

## 2024-08-17 ASSESSMENT — PAIN - FUNCTIONAL ASSESSMENT
PAIN_FUNCTIONAL_ASSESSMENT: 0-10

## 2024-08-17 ASSESSMENT — PAIN SCALES - GENERAL
PAINLEVEL_OUTOF10: 0 - NO PAIN

## 2024-08-17 NOTE — CARE PLAN
The patient's goals for the shift include      The clinical goals for the shift include remain free from falls    Over the shift, the patient did not make progress toward the following goals. Barriers to progression include generalized weakness. Recommendations to address these barriers include strengthening with PT .

## 2024-08-17 NOTE — PROGRESS NOTES
Estuardo El is a 74 y.o. male on day 6 of admission presenting with Acute renal failure, unspecified acute renal failure type (CMS-HCC).      Subjective   Sitting up on the side of the bed eating breakfast. States he feels okay this morning, denies any complaints. Tolerating PO. Denies difficulty urinating and constipation.        Objective     Last Recorded Vitals  BP (!) 139/113 (BP Location: Left arm, Patient Position: Lying)   Pulse 81   Temp 36.6 °C (97.9 °F) (Temporal)   Resp 15   Wt 82.8 kg (182 lb 8.7 oz)   SpO2 96%   Intake/Output last 3 Shifts:    Intake/Output Summary (Last 24 hours) at 8/17/2024 0855  Last data filed at 8/17/2024 0710  Gross per 24 hour   Intake 775 ml   Output 900 ml   Net -125 ml       Admission Weight  Weight: 92.5 kg (203 lb 14.8 oz) (08/12/24 0047)    Daily Weight  08/17/24 : 82.8 kg (182 lb 8.7 oz)    Image Results  EEG  IMPRESSION    This routine EEG is normal in awake and drowsy state. No epileptiform discharges or lateralizing signs are seen.    A full report will be scanned into the patient's chart at a later time.    This report has been interpreted and electronically signed by  MR brain wo IV contrast  Narrative: Interpreted By:  Cain Shah and Meyers Emily   STUDY:  MR BRAIN WO IV CONTRAST;  8/14/2024 8:57 pm      INDICATION:  Signs/Symptoms:encephalopathy.      COMPARISON:  CT head without contrast 08/13/2024      ACCESSION NUMBER(S):  [id number]      ORDERING CLINICIAN:  ITZEL ELMORE      TECHNIQUE:  Axial T2, FLAIR, DWI, gradient echo T2 and sagittal and coronal T1  weighted images of brain were acquired.      FINDINGS:  Please note, several sequences are degraded secondary to motion  artifact.      There is no diffusion restriction abnormality to suggest acute  infarct. No abnormal susceptibility artifact to indicate  microhemorrhage.      Moderate generalized parenchymal volume loss. Moderate burden  periventricular and subcortical white matter  T2/FLAIR  hyperintensities, which likely reflect a sequelae of chronic  small-vessel ischemic change.      Ventricles, sulci, and basal cisterns are age concordant.      There is no mass effect or midline shift.      The visualized paranasal sinuses clear. Small left mastoid air cell  effusion. Right mastoid air cells are clear.      Impression: 1. Partially limited examination secondary to motion artifact.  However, within these constraints, There is no evidence of acute  hemorrhage, mass lesion or acute infarction.  2. moderate generalized parenchymal volume loss and sequelae of  chronic small-vessel ischemic change.  3. Small left mastoid air cell effusion.      I personally reviewed the images/study, and I agree with the findings  as stated above. This study was interpreted at [facility], [place], [place].      MACRO:  None      Signed by: Cain Shah 8/15/2024 11:46 AM  Dictation workstation:   [id number]      Physical Exam  Constitutional:       General: He is not in acute distress.     Appearance: He is not toxic-appearing.   HENT:      Head: Normocephalic.      Mouth/Throat:      Pharynx: Oropharynx is clear.   Eyes:      General: No scleral icterus.  Cardiovascular:      Rate and Rhythm: Normal rate.   Pulmonary:      Effort: No respiratory distress.      Breath sounds: No wheezing.   Abdominal:      General: There is no distension.   Musculoskeletal:      Right lower leg: No edema.      Left lower leg: No edema.   Neurological:      Mental Status: He is alert.   Psychiatric:         Behavior: Behavior normal.         Relevant Results               Assessment/Plan        Assessment & Plan  Acute renal failure, unspecified acute renal failure type (CMS-HCC)  Nephrology following  Ethylene glycol and volatile compound panel negative  Given significantly elevated hep C RNA, concern for possible cryoglobulinemia, follow up cryoglobulins  Per nephrology, will need renal  biopsy on Monday 8/19  Hold aspirin  Metabolic acidosis  Resolved  Continue PO bicarb  Acute metabolic encephalopathy  Suspect due to delirium from acute illness and sleep disturbance at night  Also likely has a component of cognitive impairment at baseline - per son, patient is baseline A&Ox3 but is forgetful of smaller details like days of the week and exact city/location  Neurology following  Ammonia wnl, pCO2 wnl  CT head negative  MRI brain negative  EEG wnl  Continue trazodone nightly for sleep  Delirium precautions  Hypertensive urgency  Cardiology following  Continue amlodipine, hydralazine, and coreg  Avoiding ACEi/ARBs for now due to renal failure  Workup suggestive of primary hyperaldosteronism  CT adrenals unremarkable  Improving   Renal cyst  Renal US showing complex cyst in the inferior pole of the left kidney  Will need outpatient follow up for complex renal cyst with urology   Chronic combined systolic and diastolic heart failure (Multi)  Echo showing EF 40-45% with diastolic dysfunction  Avoiding ACEi/ARB and diuretics due to renal failure  Management per cardiology/nephrology   Chronic hepatitis C without hepatic coma (Multi)  Evaluated by GI  Hep C viral load 592,000  Will need outpatient follow up with Dr. Mcgraw for Hep C treatment and liver surveillance   Elevated liver enzymes  Evaluated by GI - predominately hepatocellular  Has hx of EtOH use and Hep C  Improving   Tobacco use  Nicotine patch  Recommend cessation  Hypokalemia  Replace PRN  Resolved     Plan:  Remains status quo  Planning for renal biopsy on Monday, 8/19  Hold aspirin  PT/OT  Anticipate discharge to SNF when medically cleared              Kayla Boyd MD

## 2024-08-17 NOTE — PROGRESS NOTES
"Estuardo El is a 74 y.o. male on day 6 of admission presenting with Acute renal failure, unspecified acute renal failure type (CMS-HCC).    Subjective   Seen for acute kidney injury he is resting comfortably no overnight events noted       Objective     Physical Exam  Neck:      Vascular: No carotid bruit.   Cardiovascular:      Rate and Rhythm: Normal rate and regular rhythm.      Heart sounds: No murmur heard.     No friction rub. No gallop.   Pulmonary:      Breath sounds: No wheezing, rhonchi or rales.   Chest:      Chest wall: No tenderness.   Abdominal:      General: There is no distension.      Tenderness: There is no abdominal tenderness. There is no guarding or rebound.   Musculoskeletal:         General: No swelling or tenderness.      Cervical back: Neck supple.      Right lower leg: No edema.      Left lower leg: No edema.   Lymphadenopathy:      Cervical: No cervical adenopathy.         Last Recorded Vitals  Blood pressure 129/72, pulse 84, temperature 36.5 °C (97.7 °F), temperature source Temporal, resp. rate 19, height 1.778 m (5' 10\"), weight 82.8 kg (182 lb 8.7 oz), SpO2 97%.    Intake/Output last 3 Shifts:  I/O last 3 completed shifts:  In: 600 (7.2 mL/kg) [P.O.:600]  Out: 1400 (16.9 mL/kg) [Urine:1400 (0.5 mL/kg/hr)]  Weight: 82.8 kg     Current Facility-Administered Medications:     acetaminophen (Tylenol) tablet 650 mg, 650 mg, oral, q6h PRN, Mary Batres DO, 650 mg at 08/16/24 1519    amLODIPine (Norvasc) tablet 10 mg, 10 mg, oral, Daily, Kayla Boyd MD, 10 mg at 08/17/24 0943    [Held by provider] aspirin EC tablet 81 mg, 81 mg, oral, Daily, Mary Batres DO, 81 mg at 08/11/24 0942    carvedilol (Coreg) tablet 12.5 mg, 12.5 mg, oral, BID, Kayla Boyd MD, 12.5 mg at 08/17/24 0552    heparin (porcine) injection 5,000 Units, 5,000 Units, subcutaneous, q8h, Kayla Boyd MD, 5,000 Units at 08/17/24 0552    hydrALAZINE (Apresoline) tablet 50 mg, 50 mg, oral, TID, Kayla VELASQUEZ" MD Oliver, 50 mg at 08/17/24 0943    melatonin tablet 6 mg, 6 mg, oral, Nightly, Kayla Boyd MD, 6 mg at 08/16/24 2130    nicotine (Nicoderm CQ) 14 mg/24 hr patch 1 patch, 1 patch, transdermal, Daily, Mary Batres DO, 1 patch at 08/17/24 0942    ondansetron (Zofran) injection 4 mg, 4 mg, intravenous, q6h PRN, Mary Batres DO    perflutren protein A microsphere (Optison) injection 0.5 mL, 0.5 mL, intravenous, Once in imaging, Mary Batres DO    polyethylene glycol (Glycolax, Miralax) packet 17 g, 17 g, oral, Daily, Kayla Boyd MD, 17 g at 08/17/24 0943    sodium bicarbonate tablet 650 mg, 650 mg, oral, BID, René Peralta MD, 650 mg at 08/17/24 0943    sulfur hexafluoride microsphr (Lumason) injection 24.28 mg, 2 mL, intravenous, Once in imaging, Mary Batres DO    traZODone (Desyrel) tablet 50 mg, 50 mg, oral, Nightly, Kayla Boyd MD, 50 mg at 08/16/24 2130   Relevant Results    Results for orders placed or performed during the hospital encounter of 08/11/24 (from the past 96 hour(s))   POCT GLUCOSE   Result Value Ref Range    POCT Glucose 118 (H) 74 - 99 mg/dL   Ammonia   Result Value Ref Range    Ammonia 29 12 - 45 umol/L   Comprehensive Metabolic Panel   Result Value Ref Range    Glucose 89 65 - 99 mg/dL    Sodium 138 133 - 145 mmol/L    Potassium 3.7 3.4 - 5.1 mmol/L    Chloride 100 97 - 107 mmol/L    Bicarbonate 22 (L) 24 - 31 mmol/L    Urea Nitrogen 49 (H) 8 - 25 mg/dL    Creatinine 3.20 (H) 0.40 - 1.60 mg/dL    eGFR 20 (L) >60 mL/min/1.73m*2    Calcium 9.1 8.5 - 10.4 mg/dL    Albumin 3.9 3.5 - 5.0 g/dL    Alkaline Phosphatase 78 35 - 125 U/L    Total Protein 7.0 5.9 - 7.9 g/dL     (H) 5 - 40 U/L    Bilirubin, Total 0.8 0.1 - 1.2 mg/dL     (H) 5 - 40 U/L    Anion Gap 16 <=19 mmol/L   CBC   Result Value Ref Range    WBC 8.7 4.4 - 11.3 x10*3/uL    nRBC 0.6 (H) 0.0 - 0.0 /100 WBCs    RBC 4.27 (L) 4.50 - 5.90 x10*6/uL    Hemoglobin 14.1 13.5 - 17.5 g/dL     Hematocrit 42.9 41.0 - 52.0 %     (H) 80 - 100 fL    MCH 33.0 26.0 - 34.0 pg    MCHC 32.9 32.0 - 36.0 g/dL    RDW 16.1 (H) 11.5 - 14.5 %    Platelets 119 (L) 150 - 450 x10*3/uL   BLOOD GAS ARTERIAL   Result Value Ref Range    POCT pH, Arterial 7.44 (H) 7.38 - 7.42 pH    POCT pCO2, Arterial 41 38 - 42 mm Hg    POCT pO2, Arterial 80 (L) 85 - 95 mm Hg    POCT SO2, Arterial 97 94 - 100 %    POCT Oxy Hemoglobin, Arterial 94.7 94.0 - 98.0 %    POCT Base Excess, Arterial 3.3 (H) -2.0 - 3.0 mmol/L    POCT HCO3 Calculated, Arterial 27.8 (H) 22.0 - 26.0 mmol/L    Patient Temperature 37.0 degrees Celsius    FiO2 21 %    Site of Arterial Puncture Radial Right     Justin's Test Positive    CBC   Result Value Ref Range    WBC 8.4 4.4 - 11.3 x10*3/uL    nRBC 0.2 (H) 0.0 - 0.0 /100 WBCs    RBC 3.97 (L) 4.50 - 5.90 x10*6/uL    Hemoglobin 13.0 (L) 13.5 - 17.5 g/dL    Hematocrit 38.7 (L) 41.0 - 52.0 %    MCV 98 80 - 100 fL    MCH 32.7 26.0 - 34.0 pg    MCHC 33.6 32.0 - 36.0 g/dL    RDW 15.3 (H) 11.5 - 14.5 %    Platelets 113 (L) 150 - 450 x10*3/uL   Comprehensive Metabolic Panel   Result Value Ref Range    Glucose 96 65 - 99 mg/dL    Sodium 140 133 - 145 mmol/L    Potassium 4.1 3.4 - 5.1 mmol/L    Chloride 104 97 - 107 mmol/L    Bicarbonate 23 (L) 24 - 31 mmol/L    Urea Nitrogen 49 (H) 8 - 25 mg/dL    Creatinine 3.30 (H) 0.40 - 1.60 mg/dL    eGFR 19 (L) >60 mL/min/1.73m*2    Calcium 8.9 8.5 - 10.4 mg/dL    Albumin 3.5 3.5 - 5.0 g/dL    Alkaline Phosphatase 65 35 - 125 U/L    Total Protein 6.6 5.9 - 7.9 g/dL    AST 69 (H) 5 - 40 U/L    Bilirubin, Total 0.6 0.1 - 1.2 mg/dL     (H) 5 - 40 U/L    Anion Gap 13 <=19 mmol/L   Protein, Urine Random   Result Value Ref Range    Total Protein, Urine Random 60 NOT ESTABLISHED mg/dL    Creatinine, Urine Random 93.4 NOT ESTABLISHED mg/dL    T. Protein/Creatinine Ratio 0.64 mg/mg Creat   CBC   Result Value Ref Range    WBC 8.2 4.4 - 11.3 x10*3/uL    nRBC 0.0 0.0 - 0.0 /100 WBCs    RBC  3.92 (L) 4.50 - 5.90 x10*6/uL    Hemoglobin 13.0 (L) 13.5 - 17.5 g/dL    Hematocrit 39.3 (L) 41.0 - 52.0 %     80 - 100 fL    MCH 33.2 26.0 - 34.0 pg    MCHC 33.1 32.0 - 36.0 g/dL    RDW 15.7 (H) 11.5 - 14.5 %    Platelets 121 (L) 150 - 450 x10*3/uL   Comprehensive Metabolic Panel   Result Value Ref Range    Glucose 93 65 - 99 mg/dL    Sodium 139 133 - 145 mmol/L    Potassium 4.4 3.4 - 5.1 mmol/L    Chloride 102 97 - 107 mmol/L    Bicarbonate 24 24 - 31 mmol/L    Urea Nitrogen 45 (H) 8 - 25 mg/dL    Creatinine 3.00 (H) 0.40 - 1.60 mg/dL    eGFR 21 (L) >60 mL/min/1.73m*2    Calcium 9.0 8.5 - 10.4 mg/dL    Albumin 3.7 3.5 - 5.0 g/dL    Alkaline Phosphatase 66 35 - 125 U/L    Total Protein 6.8 5.9 - 7.9 g/dL    AST 52 (H) 5 - 40 U/L    Bilirubin, Total 0.6 0.1 - 1.2 mg/dL     (H) 5 - 40 U/L    Anion Gap 13 <=19 mmol/L   Phosphorus   Result Value Ref Range    Phosphorus 3.5 2.5 - 4.5 mg/dL   Lactate Dehydrogenase   Result Value Ref Range     (H) 91 - 227 U/L   Auto Differential   Result Value Ref Range    Neutrophils % 70.0 40.0 - 80.0 %    Immature Granulocytes %, Automated 0.9 0.0 - 0.9 %    Lymphocytes % 16.3 13.0 - 44.0 %    Monocytes % 11.2 2.0 - 10.0 %    Eosinophils % 1.2 0.0 - 6.0 %    Basophils % 0.4 0.0 - 2.0 %    Neutrophils Absolute 5.95 (H) 1.60 - 5.50 x10*3/uL    Immature Granulocytes Absolute, Automated 0.08 0.00 - 0.50 x10*3/uL    Lymphocytes Absolute 1.38 0.80 - 3.00 x10*3/uL    Monocytes Absolute 0.95 (H) 0.05 - 0.80 x10*3/uL    Eosinophils Absolute 0.10 0.00 - 0.40 x10*3/uL    Basophils Absolute 0.03 0.00 - 0.10 x10*3/uL   Pathologist Review-CBC Differential   Result Value Ref Range    Pathologist Review-CBC Differential       Normocytic anemia. Few schistocytes (less than 1%). Anisocytosis. Mild thrombocytopenia.   Morphology   Result Value Ref Range    RBC Morphology See Below     Polychromasia Mild     RBC Fragments Few     Spherocytes Few     Target Cells Few     Ovalocytes  Few     Teardrop Cells Few     Laurel Cells Few     Vacuolated Neutrophils Present    POCT GLUCOSE   Result Value Ref Range    POCT Glucose 103 (H) 74 - 99 mg/dL   Haptoglobin   Result Value Ref Range    Haptoglobin 15 (L) 37 - 246 mg/dL   POCT GLUCOSE   Result Value Ref Range    POCT Glucose 110 (H) 74 - 99 mg/dL   POCT GLUCOSE   Result Value Ref Range    POCT Glucose 144 (H) 74 - 99 mg/dL   CBC   Result Value Ref Range    WBC 8.7 4.4 - 11.3 x10*3/uL    nRBC 0.0 0.0 - 0.0 /100 WBCs    RBC 3.75 (L) 4.50 - 5.90 x10*6/uL    Hemoglobin 12.3 (L) 13.5 - 17.5 g/dL    Hematocrit 36.7 (L) 41.0 - 52.0 %    MCV 98 80 - 100 fL    MCH 32.8 26.0 - 34.0 pg    MCHC 33.5 32.0 - 36.0 g/dL    RDW 15.4 (H) 11.5 - 14.5 %    Platelets 112 (L) 150 - 450 x10*3/uL   Comprehensive Metabolic Panel   Result Value Ref Range    Glucose 89 65 - 99 mg/dL    Sodium 140 133 - 145 mmol/L    Potassium 4.7 3.4 - 5.1 mmol/L    Chloride 105 97 - 107 mmol/L    Bicarbonate 25 24 - 31 mmol/L    Urea Nitrogen 44 (H) 8 - 25 mg/dL    Creatinine 2.90 (H) 0.40 - 1.60 mg/dL    eGFR 22 (L) >60 mL/min/1.73m*2    Calcium 8.9 8.5 - 10.4 mg/dL    Albumin 3.3 (L) 3.5 - 5.0 g/dL    Alkaline Phosphatase 60 35 - 125 U/L    Total Protein 6.4 5.9 - 7.9 g/dL    AST 42 (H) 5 - 40 U/L    Bilirubin, Total 0.5 0.1 - 1.2 mg/dL    ALT 96 (H) 5 - 40 U/L    Anion Gap 10 <=19 mmol/L       Assessment/Plan     Acute kidney injury there is no overall change in his renal function we will plan renal biopsy on Monday morning  Hypertensive urgency blood pressure is under control now  Congestive heart failure compensated  Hepatitis C  BPH  Metabolic acidosis  Elevated liver enzymes  Encephalopathy improving           Ayo Peralta MD

## 2024-08-17 NOTE — CARE PLAN
The patient's goals for the shift include      The clinical goals for the shift include Safety    Over the shift, the patient did not make progress toward the following goals. Barriers to progression include none. Recommendations to address these barriers include none  Problem: Skin  Goal: Decreased wound size/increased tissue granulation at next dressing change  Outcome: Progressing  Goal: Participates in plan/prevention/treatment measures  Outcome: Progressing  Goal: Prevent/manage excess moisture  Outcome: Progressing  Goal: Prevent/minimize sheer/friction injuries  Outcome: Progressing  Goal: Promote/optimize nutrition  Outcome: Progressing  Goal: Promote skin healing  Outcome: Progressing     Problem: Fall/Injury  Goal: Not fall by end of shift  Outcome: Progressing  Goal: Be free from injury by end of the shift  Outcome: Progressing  Goal: Verbalize understanding of personal risk factors for fall in the hospital  Outcome: Progressing  Goal: Verbalize understanding of risk factor reduction measures to prevent injury from fall in the home  Outcome: Progressing  Goal: Use assistive devices by end of the shift  Outcome: Progressing  Goal: Pace activities to prevent fatigue by end of the shift  Outcome: Progressing     Problem: Renal Failure  Goal: I will maintain optimum fluid volume with treatment  Outcome: Progressing     Problem: Recent hospitalization or complication while living with HTN  Goal: Patient will effectively self-manage their HTN disease process  Outcome: Progressing   .

## 2024-08-17 NOTE — ASSESSMENT & PLAN NOTE
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

## 2024-08-18 VITALS
WEIGHT: 181.44 LBS | SYSTOLIC BLOOD PRESSURE: 155 MMHG | DIASTOLIC BLOOD PRESSURE: 83 MMHG | BODY MASS INDEX: 25.98 KG/M2 | OXYGEN SATURATION: 98 % | RESPIRATION RATE: 12 BRPM | HEART RATE: 77 BPM | TEMPERATURE: 98.8 F | HEIGHT: 70 IN

## 2024-08-18 LAB
ALBUMIN SERPL-MCNC: 3.3 G/DL (ref 3.5–5)
ALP BLD-CCNC: 62 U/L (ref 35–125)
ALT SERPL-CCNC: 81 U/L (ref 5–40)
ANION GAP SERPL CALC-SCNC: 10 MMOL/L
AST SERPL-CCNC: 40 U/L (ref 5–40)
BILIRUB SERPL-MCNC: 0.5 MG/DL (ref 0.1–1.2)
BUN SERPL-MCNC: 39 MG/DL (ref 8–25)
CALCIUM SERPL-MCNC: 9.1 MG/DL (ref 8.5–10.4)
CHLORIDE SERPL-SCNC: 104 MMOL/L (ref 97–107)
CO2 SERPL-SCNC: 25 MMOL/L (ref 24–31)
CREAT SERPL-MCNC: 2.7 MG/DL (ref 0.4–1.6)
EGFRCR SERPLBLD CKD-EPI 2021: 24 ML/MIN/1.73M*2
ERYTHROCYTE [DISTWIDTH] IN BLOOD BY AUTOMATED COUNT: 15.3 % (ref 11.5–14.5)
GLUCOSE SERPL-MCNC: 82 MG/DL (ref 65–99)
HCT VFR BLD AUTO: 37.9 % (ref 41–52)
HGB BLD-MCNC: 12.6 G/DL (ref 13.5–17.5)
MAGNESIUM SERPL-MCNC: 2.3 MG/DL (ref 1.6–3.1)
MCH RBC QN AUTO: 33.1 PG (ref 26–34)
MCHC RBC AUTO-ENTMCNC: 33.2 G/DL (ref 32–36)
MCV RBC AUTO: 100 FL (ref 80–100)
NRBC BLD-RTO: 0 /100 WBCS (ref 0–0)
PLATELET # BLD AUTO: 119 X10*3/UL (ref 150–450)
POTASSIUM SERPL-SCNC: 4.9 MMOL/L (ref 3.4–5.1)
PROT SERPL-MCNC: 6.6 G/DL (ref 5.9–7.9)
RBC # BLD AUTO: 3.81 X10*6/UL (ref 4.5–5.9)
SODIUM SERPL-SCNC: 139 MMOL/L (ref 133–145)
WBC # BLD AUTO: 8.1 X10*3/UL (ref 4.4–11.3)

## 2024-08-18 PROCEDURE — S4991 NICOTINE PATCH NONLEGEND: HCPCS | Performed by: HOSPITALIST

## 2024-08-18 PROCEDURE — 36415 COLL VENOUS BLD VENIPUNCTURE: CPT | Performed by: STUDENT IN AN ORGANIZED HEALTH CARE EDUCATION/TRAINING PROGRAM

## 2024-08-18 PROCEDURE — 2500000002 HC RX 250 W HCPCS SELF ADMINISTERED DRUGS (ALT 637 FOR MEDICARE OP, ALT 636 FOR OP/ED): Performed by: HOSPITALIST

## 2024-08-18 PROCEDURE — 84075 ASSAY ALKALINE PHOSPHATASE: CPT | Performed by: STUDENT IN AN ORGANIZED HEALTH CARE EDUCATION/TRAINING PROGRAM

## 2024-08-18 PROCEDURE — 2500000001 HC RX 250 WO HCPCS SELF ADMINISTERED DRUGS (ALT 637 FOR MEDICARE OP): Performed by: STUDENT IN AN ORGANIZED HEALTH CARE EDUCATION/TRAINING PROGRAM

## 2024-08-18 PROCEDURE — 83735 ASSAY OF MAGNESIUM: CPT | Performed by: STUDENT IN AN ORGANIZED HEALTH CARE EDUCATION/TRAINING PROGRAM

## 2024-08-18 PROCEDURE — 85027 COMPLETE CBC AUTOMATED: CPT | Performed by: STUDENT IN AN ORGANIZED HEALTH CARE EDUCATION/TRAINING PROGRAM

## 2024-08-18 PROCEDURE — 2060000001 HC INTERMEDIATE ICU ROOM DAILY

## 2024-08-18 PROCEDURE — 99232 SBSQ HOSP IP/OBS MODERATE 35: CPT | Performed by: STUDENT IN AN ORGANIZED HEALTH CARE EDUCATION/TRAINING PROGRAM

## 2024-08-18 PROCEDURE — 2500000004 HC RX 250 GENERAL PHARMACY W/ HCPCS (ALT 636 FOR OP/ED): Performed by: STUDENT IN AN ORGANIZED HEALTH CARE EDUCATION/TRAINING PROGRAM

## 2024-08-18 PROCEDURE — 2500000004 HC RX 250 GENERAL PHARMACY W/ HCPCS (ALT 636 FOR OP/ED): Performed by: INTERNAL MEDICINE

## 2024-08-18 RX ADMIN — HEPARIN SODIUM 5000 UNITS: 5000 INJECTION, SOLUTION INTRAVENOUS; SUBCUTANEOUS at 21:11

## 2024-08-18 RX ADMIN — HEPARIN SODIUM 5000 UNITS: 5000 INJECTION, SOLUTION INTRAVENOUS; SUBCUTANEOUS at 14:59

## 2024-08-18 RX ADMIN — Medication 1 PATCH: at 10:58

## 2024-08-18 RX ADMIN — POLYETHYLENE GLYCOL 3350 17 G: 17 POWDER, FOR SOLUTION ORAL at 10:59

## 2024-08-18 RX ADMIN — Medication 6 MG: at 21:11

## 2024-08-18 RX ADMIN — HEPARIN SODIUM 5000 UNITS: 5000 INJECTION, SOLUTION INTRAVENOUS; SUBCUTANEOUS at 06:22

## 2024-08-18 RX ADMIN — HYDRALAZINE HYDROCHLORIDE 50 MG: 50 TABLET ORAL at 10:58

## 2024-08-18 RX ADMIN — TRAZODONE HYDROCHLORIDE 50 MG: 50 TABLET ORAL at 21:11

## 2024-08-18 RX ADMIN — AMLODIPINE BESYLATE 10 MG: 10 TABLET ORAL at 10:58

## 2024-08-18 RX ADMIN — HYDRALAZINE HYDROCHLORIDE 50 MG: 50 TABLET ORAL at 14:59

## 2024-08-18 RX ADMIN — SODIUM BICARBONATE 650 MG: 650 TABLET ORAL at 10:58

## 2024-08-18 RX ADMIN — CARVEDILOL 12.5 MG: 12.5 TABLET, FILM COATED ORAL at 17:20

## 2024-08-18 RX ADMIN — HYDRALAZINE HYDROCHLORIDE 50 MG: 50 TABLET ORAL at 21:11

## 2024-08-18 RX ADMIN — CARVEDILOL 12.5 MG: 12.5 TABLET, FILM COATED ORAL at 06:22

## 2024-08-18 RX ADMIN — SODIUM BICARBONATE 650 MG: 650 TABLET ORAL at 21:11

## 2024-08-18 ASSESSMENT — COGNITIVE AND FUNCTIONAL STATUS - GENERAL
EATING MEALS: A LITTLE
HELP NEEDED FOR BATHING: A LITTLE
DRESSING REGULAR LOWER BODY CLOTHING: A LITTLE
MOVING TO AND FROM BED TO CHAIR: A LITTLE
TURNING FROM BACK TO SIDE WHILE IN FLAT BAD: A LOT
MOVING TO AND FROM BED TO CHAIR: A LITTLE
WALKING IN HOSPITAL ROOM: A LOT
STANDING UP FROM CHAIR USING ARMS: A LITTLE
TOILETING: A LITTLE
DRESSING REGULAR UPPER BODY CLOTHING: A LITTLE
TOILETING: A LITTLE
DAILY ACTIVITIY SCORE: 18
MOVING FROM LYING ON BACK TO SITTING ON SIDE OF FLAT BED WITH BEDRAILS: A LITTLE
STANDING UP FROM CHAIR USING ARMS: A LITTLE
MOVING FROM LYING ON BACK TO SITTING ON SIDE OF FLAT BED WITH BEDRAILS: A LITTLE
WALKING IN HOSPITAL ROOM: A LOT
HELP NEEDED FOR BATHING: A LITTLE
CLIMB 3 TO 5 STEPS WITH RAILING: TOTAL
DAILY ACTIVITIY SCORE: 18
TURNING FROM BACK TO SIDE WHILE IN FLAT BAD: A LOT
CLIMB 3 TO 5 STEPS WITH RAILING: TOTAL
PERSONAL GROOMING: A LITTLE
EATING MEALS: A LITTLE
MOBILITY SCORE: 14
DRESSING REGULAR UPPER BODY CLOTHING: A LITTLE
MOBILITY SCORE: 14
PERSONAL GROOMING: A LITTLE
DRESSING REGULAR LOWER BODY CLOTHING: A LITTLE

## 2024-08-18 ASSESSMENT — PAIN SCALES - GENERAL
PAINLEVEL_OUTOF10: 0 - NO PAIN

## 2024-08-18 ASSESSMENT — PAIN - FUNCTIONAL ASSESSMENT
PAIN_FUNCTIONAL_ASSESSMENT: 0-10

## 2024-08-18 NOTE — CARE PLAN
The patient's goals for the shift include      The clinical goals for the shift include improve intake    Over the shift, the patient did not make progress toward the following goals. Barriers to progression include none. Recommendations to address these barriers include none  Problem: Skin  Goal: Decreased wound size/increased tissue granulation at next dressing change  Outcome: Progressing  Goal: Participates in plan/prevention/treatment measures  Outcome: Progressing  Goal: Prevent/manage excess moisture  Outcome: Progressing  Goal: Prevent/minimize sheer/friction injuries  Outcome: Progressing  Goal: Promote/optimize nutrition  Outcome: Progressing  Goal: Promote skin healing  Outcome: Progressing     Problem: Fall/Injury  Goal: Not fall by end of shift  Outcome: Progressing  Goal: Be free from injury by end of the shift  Outcome: Progressing  Goal: Verbalize understanding of personal risk factors for fall in the hospital  Outcome: Progressing  Goal: Verbalize understanding of risk factor reduction measures to prevent injury from fall in the home  Outcome: Progressing  Goal: Use assistive devices by end of the shift  Outcome: Progressing  Goal: Pace activities to prevent fatigue by end of the shift  Outcome: Progressing     Problem: Renal Failure  Goal: I will maintain optimum fluid volume with treatment  Outcome: Progressing     Problem: Recent hospitalization or complication while living with HTN  Goal: Patient will effectively self-manage their HTN disease process  Outcome: Progressing   .

## 2024-08-18 NOTE — CARE PLAN
The patient's goals for the shift include      The clinical goals for the shift include improve intake    Over the shift, the patient did not make progress toward the following goals. Barriers to progression include poor appetite. Recommendations to address these barriers include promote oob for meal, offer foods that the patient likes.

## 2024-08-18 NOTE — PROGRESS NOTES
"Estuardo El is a 74 y.o. male on day 7 of admission presenting with Acute renal failure, unspecified acute renal failure type (CMS-HCC).    Subjective   Seen for acute kidney injury he is resting comfortably no overnight events noted denies any GI symptoms       Objective     Physical Exam  Neck:      Vascular: No carotid bruit.   Cardiovascular:      Rate and Rhythm: Normal rate and regular rhythm.      Heart sounds: No murmur heard.     No friction rub. No gallop.   Pulmonary:      Breath sounds: No wheezing, rhonchi or rales.   Chest:      Chest wall: No tenderness.   Abdominal:      General: There is no distension.      Tenderness: There is no abdominal tenderness. There is no guarding or rebound.   Musculoskeletal:         General: No swelling or tenderness.      Cervical back: Neck supple.      Right lower leg: No edema.      Left lower leg: No edema.   Lymphadenopathy:      Cervical: No cervical adenopathy.         Last Recorded Vitals  Blood pressure (!) 169/98, pulse 73, temperature 37 °C (98.6 °F), temperature source Temporal, resp. rate 12, height 1.778 m (5' 10\"), weight 82.3 kg (181 lb 7 oz), SpO2 94%.    Intake/Output last 3 Shifts:  I/O last 3 completed shifts:  In: 1035 (12.6 mL/kg) [P.O.:1035]  Out: 1600 (19.4 mL/kg) [Urine:1600 (0.5 mL/kg/hr)]  Weight: 82.3 kg     Current Facility-Administered Medications:     acetaminophen (Tylenol) tablet 650 mg, 650 mg, oral, q6h PRN, Mary Batres DO, 650 mg at 08/16/24 1519    amLODIPine (Norvasc) tablet 10 mg, 10 mg, oral, Daily, Kayla Boyd MD, 10 mg at 08/18/24 1058    [Held by provider] aspirin EC tablet 81 mg, 81 mg, oral, Daily, Mary Batres DO, 81 mg at 08/11/24 0942    carvedilol (Coreg) tablet 12.5 mg, 12.5 mg, oral, BID, Kayla Boyd MD, 12.5 mg at 08/18/24 0622    heparin (porcine) injection 5,000 Units, 5,000 Units, subcutaneous, q8h, Kayla Boyd MD, 5,000 Units at 08/18/24 0622    hydrALAZINE (Apresoline) tablet 50 mg, 50 " mg, oral, TID, Kayla Boyd MD, 50 mg at 08/18/24 1058    melatonin tablet 6 mg, 6 mg, oral, Nightly, Kayla Boyd MD, 6 mg at 08/16/24 2130    nicotine (Nicoderm CQ) 14 mg/24 hr patch 1 patch, 1 patch, transdermal, Daily, Mary Batres DO, 1 patch at 08/18/24 1058    ondansetron (Zofran) injection 4 mg, 4 mg, intravenous, q6h PRN, Mary Batres DO    perflutren protein A microsphere (Optison) injection 0.5 mL, 0.5 mL, intravenous, Once in imaging, Mary Batres DO    polyethylene glycol (Glycolax, Miralax) packet 17 g, 17 g, oral, Daily, Kayla Boyd MD, 17 g at 08/18/24 1059    sodium bicarbonate tablet 650 mg, 650 mg, oral, BID, René Peralta MD, 650 mg at 08/18/24 1058    sulfur hexafluoride microsphr (Lumason) injection 24.28 mg, 2 mL, intravenous, Once in imaging, Mary Batres DO    traZODone (Desyrel) tablet 50 mg, 50 mg, oral, Nightly, Kayla Boyd MD, 50 mg at 08/16/24 2130   Relevant Results    Results for orders placed or performed during the hospital encounter of 08/11/24 (from the past 96 hour(s))   BLOOD GAS ARTERIAL   Result Value Ref Range    POCT pH, Arterial 7.44 (H) 7.38 - 7.42 pH    POCT pCO2, Arterial 41 38 - 42 mm Hg    POCT pO2, Arterial 80 (L) 85 - 95 mm Hg    POCT SO2, Arterial 97 94 - 100 %    POCT Oxy Hemoglobin, Arterial 94.7 94.0 - 98.0 %    POCT Base Excess, Arterial 3.3 (H) -2.0 - 3.0 mmol/L    POCT HCO3 Calculated, Arterial 27.8 (H) 22.0 - 26.0 mmol/L    Patient Temperature 37.0 degrees Celsius    FiO2 21 %    Site of Arterial Puncture Radial Right     Justin's Test Positive    CBC   Result Value Ref Range    WBC 8.4 4.4 - 11.3 x10*3/uL    nRBC 0.2 (H) 0.0 - 0.0 /100 WBCs    RBC 3.97 (L) 4.50 - 5.90 x10*6/uL    Hemoglobin 13.0 (L) 13.5 - 17.5 g/dL    Hematocrit 38.7 (L) 41.0 - 52.0 %    MCV 98 80 - 100 fL    MCH 32.7 26.0 - 34.0 pg    MCHC 33.6 32.0 - 36.0 g/dL    RDW 15.3 (H) 11.5 - 14.5 %    Platelets 113 (L) 150 - 450 x10*3/uL   Comprehensive  Metabolic Panel   Result Value Ref Range    Glucose 96 65 - 99 mg/dL    Sodium 140 133 - 145 mmol/L    Potassium 4.1 3.4 - 5.1 mmol/L    Chloride 104 97 - 107 mmol/L    Bicarbonate 23 (L) 24 - 31 mmol/L    Urea Nitrogen 49 (H) 8 - 25 mg/dL    Creatinine 3.30 (H) 0.40 - 1.60 mg/dL    eGFR 19 (L) >60 mL/min/1.73m*2    Calcium 8.9 8.5 - 10.4 mg/dL    Albumin 3.5 3.5 - 5.0 g/dL    Alkaline Phosphatase 65 35 - 125 U/L    Total Protein 6.6 5.9 - 7.9 g/dL    AST 69 (H) 5 - 40 U/L    Bilirubin, Total 0.6 0.1 - 1.2 mg/dL     (H) 5 - 40 U/L    Anion Gap 13 <=19 mmol/L   Protein, Urine Random   Result Value Ref Range    Total Protein, Urine Random 60 NOT ESTABLISHED mg/dL    Creatinine, Urine Random 93.4 NOT ESTABLISHED mg/dL    T. Protein/Creatinine Ratio 0.64 mg/mg Creat   CBC   Result Value Ref Range    WBC 8.2 4.4 - 11.3 x10*3/uL    nRBC 0.0 0.0 - 0.0 /100 WBCs    RBC 3.92 (L) 4.50 - 5.90 x10*6/uL    Hemoglobin 13.0 (L) 13.5 - 17.5 g/dL    Hematocrit 39.3 (L) 41.0 - 52.0 %     80 - 100 fL    MCH 33.2 26.0 - 34.0 pg    MCHC 33.1 32.0 - 36.0 g/dL    RDW 15.7 (H) 11.5 - 14.5 %    Platelets 121 (L) 150 - 450 x10*3/uL   Comprehensive Metabolic Panel   Result Value Ref Range    Glucose 93 65 - 99 mg/dL    Sodium 139 133 - 145 mmol/L    Potassium 4.4 3.4 - 5.1 mmol/L    Chloride 102 97 - 107 mmol/L    Bicarbonate 24 24 - 31 mmol/L    Urea Nitrogen 45 (H) 8 - 25 mg/dL    Creatinine 3.00 (H) 0.40 - 1.60 mg/dL    eGFR 21 (L) >60 mL/min/1.73m*2    Calcium 9.0 8.5 - 10.4 mg/dL    Albumin 3.7 3.5 - 5.0 g/dL    Alkaline Phosphatase 66 35 - 125 U/L    Total Protein 6.8 5.9 - 7.9 g/dL    AST 52 (H) 5 - 40 U/L    Bilirubin, Total 0.6 0.1 - 1.2 mg/dL     (H) 5 - 40 U/L    Anion Gap 13 <=19 mmol/L   Phosphorus   Result Value Ref Range    Phosphorus 3.5 2.5 - 4.5 mg/dL   Lactate Dehydrogenase   Result Value Ref Range     (H) 91 - 227 U/L   Auto Differential   Result Value Ref Range    Neutrophils % 70.0 40.0 - 80.0  %    Immature Granulocytes %, Automated 0.9 0.0 - 0.9 %    Lymphocytes % 16.3 13.0 - 44.0 %    Monocytes % 11.2 2.0 - 10.0 %    Eosinophils % 1.2 0.0 - 6.0 %    Basophils % 0.4 0.0 - 2.0 %    Neutrophils Absolute 5.95 (H) 1.60 - 5.50 x10*3/uL    Immature Granulocytes Absolute, Automated 0.08 0.00 - 0.50 x10*3/uL    Lymphocytes Absolute 1.38 0.80 - 3.00 x10*3/uL    Monocytes Absolute 0.95 (H) 0.05 - 0.80 x10*3/uL    Eosinophils Absolute 0.10 0.00 - 0.40 x10*3/uL    Basophils Absolute 0.03 0.00 - 0.10 x10*3/uL   Pathologist Review-CBC Differential   Result Value Ref Range    Pathologist Review-CBC Differential       Normocytic anemia. Few schistocytes (less than 1%). Anisocytosis. Mild thrombocytopenia.   Morphology   Result Value Ref Range    RBC Morphology See Below     Polychromasia Mild     RBC Fragments Few     Spherocytes Few     Target Cells Few     Ovalocytes Few     Teardrop Cells Few     Laurel Cells Few     Vacuolated Neutrophils Present    POCT GLUCOSE   Result Value Ref Range    POCT Glucose 103 (H) 74 - 99 mg/dL   Haptoglobin   Result Value Ref Range    Haptoglobin 15 (L) 37 - 246 mg/dL   POCT GLUCOSE   Result Value Ref Range    POCT Glucose 110 (H) 74 - 99 mg/dL   POCT GLUCOSE   Result Value Ref Range    POCT Glucose 144 (H) 74 - 99 mg/dL   CBC   Result Value Ref Range    WBC 8.7 4.4 - 11.3 x10*3/uL    nRBC 0.0 0.0 - 0.0 /100 WBCs    RBC 3.75 (L) 4.50 - 5.90 x10*6/uL    Hemoglobin 12.3 (L) 13.5 - 17.5 g/dL    Hematocrit 36.7 (L) 41.0 - 52.0 %    MCV 98 80 - 100 fL    MCH 32.8 26.0 - 34.0 pg    MCHC 33.5 32.0 - 36.0 g/dL    RDW 15.4 (H) 11.5 - 14.5 %    Platelets 112 (L) 150 - 450 x10*3/uL   Comprehensive Metabolic Panel   Result Value Ref Range    Glucose 89 65 - 99 mg/dL    Sodium 140 133 - 145 mmol/L    Potassium 4.7 3.4 - 5.1 mmol/L    Chloride 105 97 - 107 mmol/L    Bicarbonate 25 24 - 31 mmol/L    Urea Nitrogen 44 (H) 8 - 25 mg/dL    Creatinine 2.90 (H) 0.40 - 1.60 mg/dL    eGFR 22 (L) >60  mL/min/1.73m*2    Calcium 8.9 8.5 - 10.4 mg/dL    Albumin 3.3 (L) 3.5 - 5.0 g/dL    Alkaline Phosphatase 60 35 - 125 U/L    Total Protein 6.4 5.9 - 7.9 g/dL    AST 42 (H) 5 - 40 U/L    Bilirubin, Total 0.5 0.1 - 1.2 mg/dL    ALT 96 (H) 5 - 40 U/L    Anion Gap 10 <=19 mmol/L   CBC   Result Value Ref Range    WBC 8.1 4.4 - 11.3 x10*3/uL    nRBC 0.0 0.0 - 0.0 /100 WBCs    RBC 3.81 (L) 4.50 - 5.90 x10*6/uL    Hemoglobin 12.6 (L) 13.5 - 17.5 g/dL    Hematocrit 37.9 (L) 41.0 - 52.0 %     80 - 100 fL    MCH 33.1 26.0 - 34.0 pg    MCHC 33.2 32.0 - 36.0 g/dL    RDW 15.3 (H) 11.5 - 14.5 %    Platelets 119 (L) 150 - 450 x10*3/uL   Comprehensive Metabolic Panel   Result Value Ref Range    Glucose 82 65 - 99 mg/dL    Sodium 139 133 - 145 mmol/L    Potassium 4.9 3.4 - 5.1 mmol/L    Chloride 104 97 - 107 mmol/L    Bicarbonate 25 24 - 31 mmol/L    Urea Nitrogen 39 (H) 8 - 25 mg/dL    Creatinine 2.70 (H) 0.40 - 1.60 mg/dL    eGFR 24 (L) >60 mL/min/1.73m*2    Calcium 9.1 8.5 - 10.4 mg/dL    Albumin 3.3 (L) 3.5 - 5.0 g/dL    Alkaline Phosphatase 62 35 - 125 U/L    Total Protein 6.6 5.9 - 7.9 g/dL    AST 40 5 - 40 U/L    Bilirubin, Total 0.5 0.1 - 1.2 mg/dL    ALT 81 (H) 5 - 40 U/L    Anion Gap 10 <=19 mmol/L       Assessment/Plan     Acute kidney injury the etiology is not clear we will schedule patient for renal biopsy tomorrow discussed the procedure with him in details  Hypertensive urgency blood pressure is under control now  Congestive heart failure compensated  Hepatitis C  BPH  Metabolic acidosis  Elevated liver enzymes  Encephalopathy improving           Ayo Peralta MD

## 2024-08-18 NOTE — PROGRESS NOTES
Estuardo El is a 74 y.o. male on day 7 of admission presenting with Acute renal failure, unspecified acute renal failure type (CMS-HCC).      Subjective   States he feels okay. Reports that he was able to sleep last night. Tolerating PO. Denies difficulty urinating. Denies constipation.        Objective     Last Recorded Vitals  BP (!) 169/98 (BP Location: Right arm, Patient Position: Lying)   Pulse 73   Temp 37 °C (98.6 °F) (Temporal)   Resp 12   Wt 82.3 kg (181 lb 7 oz)   SpO2 94%   Intake/Output last 3 Shifts:    Intake/Output Summary (Last 24 hours) at 8/18/2024 1156  Last data filed at 8/18/2024 0834  Gross per 24 hour   Intake 575 ml   Output 875 ml   Net -300 ml       Admission Weight  Weight: 92.5 kg (203 lb 14.8 oz) (08/12/24 0047)    Daily Weight  08/18/24 : 82.3 kg (181 lb 7 oz)    Image Results  EEG  IMPRESSION    This routine EEG is normal in awake and drowsy state. No epileptiform discharges or lateralizing signs are seen.    A full report will be scanned into the patient's chart at a later time.    This report has been interpreted and electronically signed by  MR brain wo IV contrast  Narrative: Interpreted By:  Cain Shah and Meyers Emily   STUDY:  MR BRAIN WO IV CONTRAST;  8/14/2024 8:57 pm      INDICATION:  Signs/Symptoms:encephalopathy.      COMPARISON:  CT head without contrast 08/13/2024      ACCESSION NUMBER(S):  [id number]      ORDERING CLINICIAN:  ITZEL ELMORE      TECHNIQUE:  Axial T2, FLAIR, DWI, gradient echo T2 and sagittal and coronal T1  weighted images of brain were acquired.      FINDINGS:  Please note, several sequences are degraded secondary to motion  artifact.      There is no diffusion restriction abnormality to suggest acute  infarct. No abnormal susceptibility artifact to indicate  microhemorrhage.      Moderate generalized parenchymal volume loss. Moderate burden  periventricular and subcortical white matter T2/FLAIR  hyperintensities, which likely reflect a  sequelae of chronic  small-vessel ischemic change.      Ventricles, sulci, and basal cisterns are age concordant.      There is no mass effect or midline shift.      The visualized paranasal sinuses clear. Small left mastoid air cell  effusion. Right mastoid air cells are clear.      Impression: 1. Partially limited examination secondary to motion artifact.  However, within these constraints, There is no evidence of acute  hemorrhage, mass lesion or acute infarction.  2. moderate generalized parenchymal volume loss and sequelae of  chronic small-vessel ischemic change.  3. Small left mastoid air cell effusion.      I personally reviewed the images/study, and I agree with the findings  as stated above. This study was interpreted at [facility], [place], [place].      MACRO:  None      Signed by: Cain Shah 8/15/2024 11:46 AM  Dictation workstation:   [id number]      Physical Exam  Constitutional:       General: He is not in acute distress.     Appearance: He is not toxic-appearing.   HENT:      Head: Normocephalic.      Mouth/Throat:      Pharynx: Oropharynx is clear.   Eyes:      General: No scleral icterus.  Cardiovascular:      Rate and Rhythm: Normal rate.   Pulmonary:      Effort: No respiratory distress.      Breath sounds: No wheezing.   Abdominal:      General: There is no distension.      Palpations: Abdomen is soft.   Musculoskeletal:      Right lower leg: No edema.      Left lower leg: No edema.   Neurological:      Mental Status: He is alert.   Psychiatric:         Behavior: Behavior normal.         Relevant Results               Assessment/Plan        Assessment & Plan  Acute renal failure, unspecified acute renal failure type (CMS-HCC)  Nephrology following  Ethylene glycol and volatile compound panel negative  Given significantly elevated hep C RNA, concern for possible cryoglobulinemia, follow up cryoglobulins  Per nephrology, will need renal biopsy on Monday  8/19  Hold aspirin  Metabolic acidosis  Resolved  Continue PO bicarb  Acute metabolic encephalopathy  Suspect due to delirium from acute illness and sleep disturbance at night  Also likely has a component of cognitive impairment at baseline - per son, patient is baseline A&Ox3 but is forgetful of smaller details like days of the week and exact city/location  Neurology following  Ammonia wnl, pCO2 wnl  CT head negative  MRI brain negative  EEG wnl  Continue trazodone nightly for sleep  Delirium precautions  Hypertensive urgency  Cardiology following  Continue amlodipine, hydralazine, and coreg  Avoiding ACEi/ARBs for now due to renal failure  Workup suggestive of primary hyperaldosteronism  CT adrenals unremarkable  Improving   Renal cyst  Renal US showing complex cyst in the inferior pole of the left kidney  Will need outpatient follow up for complex renal cyst with urology   Chronic combined systolic and diastolic heart failure (Multi)  Echo showing EF 40-45% with diastolic dysfunction  Avoiding ACEi/ARB and diuretics due to renal failure  Management per cardiology/nephrology   Chronic hepatitis C without hepatic coma (Multi)  Evaluated by GI  Hep C viral load 592,000  Will need outpatient follow up with Dr. Mcgraw for Hep C treatment and liver surveillance   Elevated liver enzymes  Evaluated by GI - predominately hepatocellular  Has hx of EtOH use and Hep C  Improving   Tobacco use  Nicotine patch  Recommend cessation  Hypokalemia  Replace PRN  Resolved     Plan:  Remains status quo  Renal function improving Cr 4.10 on admission -> 2.70 today  Planning for renal biopsy tomorrow per nephrology, NPO at midnight  Continue to hold aspirin  PT/OT - moderate intensity, patient agreeable to SNF   Anticipate discharge to SNF when medically cleared              Kayla Boyd MD

## 2024-08-19 ENCOUNTER — APPOINTMENT (OUTPATIENT)
Dept: RADIOLOGY | Facility: HOSPITAL | Age: 74
DRG: 682 | End: 2024-08-19
Payer: MEDICARE

## 2024-08-19 LAB
ALBUMIN SERPL-MCNC: 3.4 G/DL (ref 3.5–5)
ALP BLD-CCNC: 66 U/L (ref 35–125)
ALT SERPL-CCNC: 74 U/L (ref 5–40)
ANION GAP SERPL CALC-SCNC: 11 MMOL/L
AST SERPL-CCNC: 43 U/L (ref 5–40)
BASOPHILS # BLD AUTO: 0.04 X10*3/UL (ref 0–0.1)
BASOPHILS NFR BLD AUTO: 0.5 %
BILIRUB SERPL-MCNC: 0.5 MG/DL (ref 0.1–1.2)
BUN SERPL-MCNC: 37 MG/DL (ref 8–25)
CALCIUM SERPL-MCNC: 9.3 MG/DL (ref 8.5–10.4)
CHLORIDE SERPL-SCNC: 104 MMOL/L (ref 97–107)
CO2 SERPL-SCNC: 23 MMOL/L (ref 24–31)
CREAT SERPL-MCNC: 2.8 MG/DL (ref 0.4–1.6)
CRYOGLOB SER QL: ABNORMAL
EGFRCR SERPLBLD CKD-EPI 2021: 23 ML/MIN/1.73M*2
EOSINOPHIL # BLD AUTO: 0.09 X10*3/UL (ref 0–0.4)
EOSINOPHIL NFR BLD AUTO: 1.2 %
ERYTHROCYTE [DISTWIDTH] IN BLOOD BY AUTOMATED COUNT: 15.2 % (ref 11.5–14.5)
GLUCOSE SERPL-MCNC: 92 MG/DL (ref 65–99)
HCT VFR BLD AUTO: 38.5 % (ref 41–52)
HGB BLD-MCNC: 12.5 G/DL (ref 13.5–17.5)
IMM GRANULOCYTES # BLD AUTO: 0.03 X10*3/UL (ref 0–0.5)
IMM GRANULOCYTES NFR BLD AUTO: 0.4 % (ref 0–0.9)
LYMPHOCYTES # BLD AUTO: 1.39 X10*3/UL (ref 0.8–3)
LYMPHOCYTES NFR BLD AUTO: 18.3 %
MCH RBC QN AUTO: 32.9 PG (ref 26–34)
MCHC RBC AUTO-ENTMCNC: 32.5 G/DL (ref 32–36)
MCV RBC AUTO: 101 FL (ref 80–100)
MONOCYTES # BLD AUTO: 1.12 X10*3/UL (ref 0.05–0.8)
MONOCYTES NFR BLD AUTO: 14.8 %
NEUTROPHILS # BLD AUTO: 4.92 X10*3/UL (ref 1.6–5.5)
NEUTROPHILS NFR BLD AUTO: 64.8 %
NRBC BLD-RTO: 0 /100 WBCS (ref 0–0)
PLATELET # BLD AUTO: 141 X10*3/UL (ref 150–450)
POTASSIUM SERPL-SCNC: 5.1 MMOL/L (ref 3.4–5.1)
PROT SERPL-MCNC: 6.6 G/DL (ref 5.9–7.9)
RBC # BLD AUTO: 3.8 X10*6/UL (ref 4.5–5.9)
SODIUM SERPL-SCNC: 138 MMOL/L (ref 133–145)
WBC # BLD AUTO: 7.6 X10*3/UL (ref 4.4–11.3)

## 2024-08-19 PROCEDURE — 76942 ECHO GUIDE FOR BIOPSY: CPT | Mod: RIGHT SIDE | Performed by: STUDENT IN AN ORGANIZED HEALTH CARE EDUCATION/TRAINING PROGRAM

## 2024-08-19 PROCEDURE — 2060000001 HC INTERMEDIATE ICU ROOM DAILY

## 2024-08-19 PROCEDURE — S4991 NICOTINE PATCH NONLEGEND: HCPCS | Performed by: HOSPITALIST

## 2024-08-19 PROCEDURE — 99232 SBSQ HOSP IP/OBS MODERATE 35: CPT | Performed by: HOSPITALIST

## 2024-08-19 PROCEDURE — 0TB03ZX EXCISION OF RIGHT KIDNEY, PERCUTANEOUS APPROACH, DIAGNOSTIC: ICD-10-PCS | Performed by: STUDENT IN AN ORGANIZED HEALTH CARE EDUCATION/TRAINING PROGRAM

## 2024-08-19 PROCEDURE — 2500000004 HC RX 250 GENERAL PHARMACY W/ HCPCS (ALT 636 FOR OP/ED): Performed by: INTERNAL MEDICINE

## 2024-08-19 PROCEDURE — 36415 COLL VENOUS BLD VENIPUNCTURE: CPT | Performed by: INTERNAL MEDICINE

## 2024-08-19 PROCEDURE — 2500000001 HC RX 250 WO HCPCS SELF ADMINISTERED DRUGS (ALT 637 FOR MEDICARE OP): Performed by: STUDENT IN AN ORGANIZED HEALTH CARE EDUCATION/TRAINING PROGRAM

## 2024-08-19 PROCEDURE — 85025 COMPLETE CBC W/AUTO DIFF WBC: CPT | Performed by: INTERNAL MEDICINE

## 2024-08-19 PROCEDURE — 50200 RENAL BIOPSY PERQ: CPT | Mod: RT

## 2024-08-19 PROCEDURE — 84075 ASSAY ALKALINE PHOSPHATASE: CPT | Performed by: STUDENT IN AN ORGANIZED HEALTH CARE EDUCATION/TRAINING PROGRAM

## 2024-08-19 PROCEDURE — 2720000007 HC OR 272 NO HCPCS

## 2024-08-19 PROCEDURE — 50200 RENAL BIOPSY PERQ: CPT | Mod: RIGHT SIDE | Performed by: STUDENT IN AN ORGANIZED HEALTH CARE EDUCATION/TRAINING PROGRAM

## 2024-08-19 PROCEDURE — 2500000002 HC RX 250 W HCPCS SELF ADMINISTERED DRUGS (ALT 637 FOR MEDICARE OP, ALT 636 FOR OP/ED): Performed by: HOSPITALIST

## 2024-08-19 PROCEDURE — 2500000004 HC RX 250 GENERAL PHARMACY W/ HCPCS (ALT 636 FOR OP/ED): Performed by: STUDENT IN AN ORGANIZED HEALTH CARE EDUCATION/TRAINING PROGRAM

## 2024-08-19 PROCEDURE — 88329 PATH CONSLTJ DRG SURG: CPT | Mod: TC | Performed by: STUDENT IN AN ORGANIZED HEALTH CARE EDUCATION/TRAINING PROGRAM

## 2024-08-19 PROCEDURE — 2500000005 HC RX 250 GENERAL PHARMACY W/O HCPCS: Performed by: STUDENT IN AN ORGANIZED HEALTH CARE EDUCATION/TRAINING PROGRAM

## 2024-08-19 PROCEDURE — 88329 PATH CONSLTJ DRG SURG: CPT | Performed by: PATHOLOGY

## 2024-08-19 RX ORDER — LIDOCAINE HYDROCHLORIDE 10 MG/ML
INJECTION, SOLUTION EPIDURAL; INFILTRATION; INTRACAUDAL; PERINEURAL
Status: COMPLETED | OUTPATIENT
Start: 2024-08-19 | End: 2024-08-19

## 2024-08-19 RX ADMIN — HYDRALAZINE HYDROCHLORIDE 50 MG: 50 TABLET ORAL at 08:33

## 2024-08-19 RX ADMIN — Medication 1 PATCH: at 08:33

## 2024-08-19 RX ADMIN — AMLODIPINE BESYLATE 10 MG: 10 TABLET ORAL at 08:33

## 2024-08-19 RX ADMIN — SODIUM BICARBONATE 650 MG: 650 TABLET ORAL at 08:33

## 2024-08-19 RX ADMIN — CARVEDILOL 12.5 MG: 12.5 TABLET, FILM COATED ORAL at 06:08

## 2024-08-19 RX ADMIN — HEPARIN SODIUM 5000 UNITS: 5000 INJECTION, SOLUTION INTRAVENOUS; SUBCUTANEOUS at 21:39

## 2024-08-19 RX ADMIN — TRAZODONE HYDROCHLORIDE 50 MG: 50 TABLET ORAL at 21:39

## 2024-08-19 RX ADMIN — Medication 6 MG: at 21:39

## 2024-08-19 RX ADMIN — CARVEDILOL 12.5 MG: 12.5 TABLET, FILM COATED ORAL at 16:59

## 2024-08-19 RX ADMIN — LIDOCAINE HYDROCHLORIDE 7 ML: 10 INJECTION, SOLUTION EPIDURAL; INFILTRATION; INTRACAUDAL; PERINEURAL at 14:49

## 2024-08-19 RX ADMIN — LIDOCAINE HYDROCHLORIDE 7 ML: 10 INJECTION, SOLUTION EPIDURAL; INFILTRATION; INTRACAUDAL; PERINEURAL at 14:50

## 2024-08-19 RX ADMIN — SODIUM BICARBONATE 650 MG: 650 TABLET ORAL at 21:39

## 2024-08-19 RX ADMIN — HYDRALAZINE HYDROCHLORIDE 50 MG: 50 TABLET ORAL at 15:39

## 2024-08-19 ASSESSMENT — COGNITIVE AND FUNCTIONAL STATUS - GENERAL
DRESSING REGULAR UPPER BODY CLOTHING: A LITTLE
TURNING FROM BACK TO SIDE WHILE IN FLAT BAD: A LOT
HELP NEEDED FOR BATHING: A LITTLE
MOVING TO AND FROM BED TO CHAIR: A LITTLE
EATING MEALS: A LITTLE
TURNING FROM BACK TO SIDE WHILE IN FLAT BAD: A LOT
DAILY ACTIVITIY SCORE: 18
MOBILITY SCORE: 14
TOILETING: A LITTLE
CLIMB 3 TO 5 STEPS WITH RAILING: TOTAL
HELP NEEDED FOR BATHING: A LITTLE
DRESSING REGULAR LOWER BODY CLOTHING: A LITTLE
DRESSING REGULAR UPPER BODY CLOTHING: A LITTLE
TOILETING: A LITTLE
DAILY ACTIVITIY SCORE: 18
MOVING FROM LYING ON BACK TO SITTING ON SIDE OF FLAT BED WITH BEDRAILS: A LITTLE
PERSONAL GROOMING: A LITTLE
WALKING IN HOSPITAL ROOM: A LOT
STANDING UP FROM CHAIR USING ARMS: A LITTLE
CLIMB 3 TO 5 STEPS WITH RAILING: TOTAL
PERSONAL GROOMING: A LITTLE
WALKING IN HOSPITAL ROOM: A LOT
STANDING UP FROM CHAIR USING ARMS: A LITTLE
MOVING TO AND FROM BED TO CHAIR: A LITTLE
DRESSING REGULAR LOWER BODY CLOTHING: A LITTLE
MOBILITY SCORE: 14
MOVING FROM LYING ON BACK TO SITTING ON SIDE OF FLAT BED WITH BEDRAILS: A LITTLE
EATING MEALS: A LITTLE

## 2024-08-19 ASSESSMENT — PAIN SCALES - GENERAL
PAINLEVEL_OUTOF10: 0 - NO PAIN

## 2024-08-19 ASSESSMENT — PAIN SCALES - WONG BAKER: WONGBAKER_NUMERICALRESPONSE: NO HURT

## 2024-08-19 NOTE — POST-PROCEDURE NOTE
Interventional Radiology Brief Postprocedure Note    Attending: Nata Richards MD      Assistant: none    Diagnosis: renal failure    Description of procedure: US guided random right renal biopsy     Anesthesia:  Local    Complications: None    Estimated Blood Loss: none    Medications  As of 08/19/24 1500      heparin (porcine) injection 8,750 Units (Units) Total dose:  8,750 Units Dosing weight:  109      Date/Time Rate/Dose/Volume Action       08/11/24 0358 8,750 Units Given               heparin (porcine) injection 5,000 Units (Units) Total dose:  75,000 Units* Dosing weight:  67.6   *Administration not included in total     Date/Time Rate/Dose/Volume Action       08/13/24  1600 *5,000 Units Missed     08/14/24  0215 5,000 Units Given      0926 5,000 Units Given      1432 5,000 Units Given     08/15/24  0011 5,000 Units Given      0837 5,000 Units Given      1654 5,000 Units Given     08/16/24  0000 *5,000 Units Missed      0845 5,000 Units Given      1629 5,000 Units Given      2130 5,000 Units Given     08/17/24  0552 5,000 Units Given      1439 5,000 Units Given      2138 5,000 Units Given     08/18/24  0622 5,000 Units Given      1459 5,000 Units Given      2111 5,000 Units Given     08/19/24  0608 *5,000 Units Missed      1400 *5,000 Units Missed               heparin 25,000 Units in dextrose 5% 250 mL (100 Units/mL) infusion (premix) (Units/hr) Total dose:  0 Units*   *From user-documented volume     Date/Time Rate/Dose/Volume Action       08/11/24 0358 2,000 Units/hr - 20 mL/hr New Bag      0738  Stopped               furosemide (Lasix) injection 40 mg (mg) Total dose:  40 mg      Date/Time Rate/Dose/Volume Action       08/11/24 0358 40 mg Given               perflutren lipid microspheres (Definity) injection 0.5-10 mL of dilution (mL of dilution) Total dose:  10 mL of dilution      Date/Time Rate/Dose/Volume Action       08/12/24  1037 10 mL of dilution Given               acetaminophen  (Tylenol) tablet 650 mg (mg) Total dose:  1,950 mg      Date/Time Rate/Dose/Volume Action       08/12/24  2019 650 mg Given     08/14/24  0919 650 mg Given     08/16/24  1519 650 mg Given               nicotine (Nicoderm CQ) 14 mg/24 hr patch 1 patch (patch) Total dose:  8 patch*   *Administration not included in total     Date/Time Rate/Dose/Volume Action       08/11/24  0900 *1 patch (over 1440 min) Missed     08/12/24  0845 1 patch (over 1440 min) Medication Applied     08/13/24  0811 1 patch (over 1440 min) Medication Applied      0845  (over 1440 min) Medication Removed     08/14/24  0811  (over 1440 min) Medication Removed      0919 1 patch (over 1440 min) Medication Applied     08/15/24  0837 1 patch (over 1440 min) Medication Applied     08/16/24  0837  (over 1440 min) Medication Removed      0846 1 patch (over 1440 min) Medication Applied     08/17/24  0846  (over 1440 min) Medication Removed      0942 1 patch (over 1440 min) Medication Applied     08/18/24  0859  (over 1440 min) Medication Removed      1058 1 patch (over 1440 min) Medication Applied     08/19/24  0833 1 patch (over 1440 min) Medication Applied               aspirin EC tablet 81 mg (mg) Total dose:  81 mg*   *Administration not included in total     Date/Time Rate/Dose/Volume Action       08/11/24  0942 81 mg Given      1128 *Not included in total Held by provider     08/12/24  0900 *Not included in total Automatically Held     08/13/24  0900 *81 mg Missed     08/14/24  0900 *81 mg Missed     08/15/24  0900 *81 mg Missed     08/16/24  0900 *81 mg Missed     08/17/24  0900 *81 mg Missed     08/18/24  0900 *81 mg Missed     08/19/24  0900 *Not included in total Automatically Held               amLODIPine (Norvasc) tablet 10 mg (mg) Total dose:  30 mg      Date/Time Rate/Dose/Volume Action       08/11/24  0513 10 mg Given     08/12/24  0845 10 mg Given     08/13/24  0805 10 mg Given               amLODIPine (Norvasc) tablet 10 mg (mg) Total  dose:  50 mg Dosing weight:  77.7      Date/Time Rate/Dose/Volume Action       08/15/24  0838 10 mg Given     08/16/24  0845 10 mg Given     08/17/24  0943 10 mg Given     08/18/24  1058 10 mg Given     08/19/24  0833 10 mg Given               carvedilol (Coreg) tablet 12.5 mg (mg) Total dose:  200 mg*   *Administration not included in total     Date/Time Rate/Dose/Volume Action       08/11/24  0637 12.5 mg Given      1653 12.5 mg Given     08/12/24  0545 12.5 mg Given      1648 12.5 mg Given     08/13/24  0629 12.5 mg Given      1700 *12.5 mg Missed     08/14/24  0653 12.5 mg Given      1648 12.5 mg Given     08/15/24  0635 12.5 mg Given      1654 12.5 mg Given     08/16/24  0610 12.5 mg Given      1629 12.5 mg Given     08/17/24  0552 12.5 mg Given      1711 12.5 mg Given     08/18/24  0622 12.5 mg Given      1720 12.5 mg Given     08/19/24  0608 12.5 mg Given               hydrALAZINE (Apresoline) tablet 10 mg (mg) Total dose:  10 mg      Date/Time Rate/Dose/Volume Action       08/11/24  0949 10 mg Given               sodium bicarbonate tablet 650 mg (mg) Total dose:  10,400 mg*   *Administration not included in total     Date/Time Rate/Dose/Volume Action       08/11/24  1226 650 mg Given      2315 650 mg Given     08/12/24  0845 650 mg Given      2019 650 mg Given     08/13/24  0805 650 mg Given      2119 650 mg Given     08/14/24  0919 650 mg Given      2133 650 mg Given     08/15/24  0837 650 mg Given      2130 650 mg Given     08/16/24  0845 650 mg Given      2130 650 mg Given     08/17/24  0943 650 mg Given      2100 *650 mg Missed     08/18/24  1058 650 mg Given      2111 650 mg Given     08/19/24  0833 650 mg Given               hydrALAZINE (Apresoline) tablet 25 mg (mg) Total dose:  75 mg      Date/Time Rate/Dose/Volume Action       08/11/24  1807 25 mg Given      2315 25 mg Given     08/12/24  0845 25 mg Given               hydrALAZINE (Apresoline) tablet 50 mg (mg) Total dose:  900 mg* Dosing weight:   92.5   *Administration not included in total     Date/Time Rate/Dose/Volume Action       08/12/24  1507 50 mg Given      2019 50 mg Given     08/13/24  0803 50 mg Given      1500 *50 mg Missed      2118 50 mg Given     08/14/24  0918 50 mg Given      1435 50 mg Given      2100 *50 mg Missed     08/15/24  0837 50 mg Given      1537 50 mg Given      2130 50 mg Given     08/16/24  0845 50 mg Given      1507 50 mg Given      2130 50 mg Given     08/17/24  0943 50 mg Given      1439 50 mg Given      2100 *50 mg Missed     08/18/24  1058 50 mg Given      1459 50 mg Given      2111 50 mg Given     08/19/24  0833 50 mg Given               potassium chloride CR (Klor-Con M20) ER tablet 20 mEq (mEq) Total dose:  20 mEq Dosing weight:  92.5      Date/Time Rate/Dose/Volume Action       08/12/24  1101 20 mEq Given               potassium chloride CR (Klor-Con M20) ER tablet 20 mEq (mEq) Total dose:  20 mEq Dosing weight:  67.6      Date/Time Rate/Dose/Volume Action       08/13/24  0804 20 mEq Given               polyethylene glycol (Glycolax, Miralax) packet 17 g (g) Total dose:  68 g* Dosing weight:  67.6   *Administration not included in total     Date/Time Rate/Dose/Volume Action       08/13/24  0925 17 g Given     08/14/24  0918 17 g Given     08/15/24  0900 *17 g Missed     08/16/24  0900 *17 g Missed     08/17/24  0943 17 g Given     08/18/24  1059 17 g Given     08/19/24  0900 *17 g Missed               amLODIPine (Norvasc) tablet 5 mg (mg) Total dose:  5 mg Dosing weight:  77.7      Date/Time Rate/Dose/Volume Action       08/14/24  0919 5 mg Given               traZODone (Desyrel) tablet 50 mg (mg) Total dose:  200 mg* Dosing weight:  77.7   *Administration not included in total     Date/Time Rate/Dose/Volume Action       08/14/24  2133 50 mg Given     08/15/24  2130 50 mg Given     08/16/24  2130 50 mg Given     08/17/24  2100 *50 mg Missed     08/18/24  2111 50 mg Given               melatonin tablet 6 mg (mg) Total  dose:  24 mg* Dosing weight:  77.7   *Administration not included in total     Date/Time Rate/Dose/Volume Action       08/14/24  2133 6 mg Given     08/15/24  2130 6 mg Given     08/16/24 2130 6 mg Given     08/17/24  2100 *6 mg Missed     08/18/24 2111 6 mg Given               lidocaine PF (Xylocaine) 10 mg/mL (1 %) injection (mL) Total volume:  14 mL      Date/Time Rate/Dose/Volume Action       08/19/24  1449 7 mL Given      1450 7 mL Given                   No specimens collected      See detailed result report with images in PACS.    The patient tolerated the procedure well without incident or complication and is in stable condition.

## 2024-08-19 NOTE — PROGRESS NOTES
"Physical Therapy                 Therapy Communication Note    Patient Name: Estuardo El  MRN: [id number]  Today's Date: 8/19/2024     Discipline: Physical Therapy    Missed Visit Reason: Missed Visit Reason: Patient refused (Pt refused, reports \"I don't feel like it\". Advised will attempt to see later if schedule allows.)    Missed Time: Attempt 10:04    Comment:  "

## 2024-08-19 NOTE — PROGRESS NOTES
"Per Nephrology note \"supposedly he is going for renal biopsy today waiting for IR decision that they have a slot for him today or not\"    Dc plan is for pt to go to [facility]. Pt will need a precert. Uncertain of when pt will be medically cleared. Pt will need to participate in therapy in order to obtain precert. TCC will follow to start precert and send updates.    Safe dc plan not secured-need precert  "

## 2024-08-19 NOTE — NURSING NOTE
RN contacted IR. Patient will be having kidney biopsy today at 1400. Patient remained NPO. Patient made aware

## 2024-08-19 NOTE — PROGRESS NOTES
Patient is here for SUZANNE creatinine about the same 2.9 supposedly he is going for renal biopsy today waiting for IR decision that they have a slot for him today or not

## 2024-08-19 NOTE — PROGRESS NOTES
Estuardo El is a 74 y.o. male on day 8 of admission presenting with Acute renal failure, unspecified acute renal failure type (CMS-HCC).      Subjective   Patient reports he feels fine.  Says he feels like he only ever get his kidney biopsy.  No other complaints.       Objective     Last Recorded Vitals  /86 (BP Location: Left arm, Patient Position: Lying)   Pulse 71   Temp 36.8 °C (98.2 °F) (Temporal)   Resp (!) 9   Wt 79.6 kg (175 lb 7.8 oz)   SpO2 96%   Intake/Output last 3 Shifts:    Intake/Output Summary (Last 24 hours) at 8/19/2024 1320  Last data filed at 8/19/2024 0949  Gross per 24 hour   Intake 300 ml   Output 1000 ml   Net -700 ml       Admission Weight  Weight: 92.5 kg (203 lb 14.8 oz) (08/12/24 0047)    Daily Weight  08/19/24 : 79.6 kg (175 lb 7.8 oz)    Image Results  EEG  IMPRESSION    This routine EEG is normal in awake and drowsy state. No epileptiform discharges or lateralizing signs are seen.    A full report will be scanned into the patient's chart at a later time.    This report has been interpreted and electronically signed by  MR brain wo IV contrast  Narrative: Interpreted By:  Cain Shah and Meyers Emily   STUDY:  MR BRAIN WO IV CONTRAST;  8/14/2024 8:57 pm      INDICATION:  Signs/Symptoms:encephalopathy.      COMPARISON:  CT head without contrast 08/13/2024      ACCESSION NUMBER(S):  [id number]      ORDERING CLINICIAN:  ITZEL ELMORE      TECHNIQUE:  Axial T2, FLAIR, DWI, gradient echo T2 and sagittal and coronal T1  weighted images of brain were acquired.      FINDINGS:  Please note, several sequences are degraded secondary to motion  artifact.      There is no diffusion restriction abnormality to suggest acute  infarct. No abnormal susceptibility artifact to indicate  microhemorrhage.      Moderate generalized parenchymal volume loss. Moderate burden  periventricular and subcortical white matter T2/FLAIR  hyperintensities, which likely reflect a sequelae of  chronic  small-vessel ischemic change.      Ventricles, sulci, and basal cisterns are age concordant.      There is no mass effect or midline shift.      The visualized paranasal sinuses clear. Small left mastoid air cell  effusion. Right mastoid air cells are clear.      Impression: 1. Partially limited examination secondary to motion artifact.  However, within these constraints, There is no evidence of acute  hemorrhage, mass lesion or acute infarction.  2. moderate generalized parenchymal volume loss and sequelae of  chronic small-vessel ischemic change.  3. Small left mastoid air cell effusion.      I personally reviewed the images/study, and I agree with the findings  as stated above. This study was interpreted at [place], [place].      MACRO:  None      Signed by: Cain Shah 8/15/2024 11:46 AM  Dictation workstation:   [id number]      Physical Exam  General: alert, no diaphoresis   HENT: mucous membranes moist, external ears normal, no rhinorrhea   Eyes: no icterus or injection, no discharge   Lungs: CTA BL   Heart: RRR, no LE edema BL   GI: abdomen soft, nontender, nondistended, BS present   MSK: no joint effusion or deformity   Skin: no rashes, erythema, or ecchymosis   Neuro: grossly normal cognition, motor strength, sensation    Relevant Results               Assessment/Plan                  Assessment & Plan  Acute renal failure, unspecified acute renal failure type (CMS-HCC)  Nephrology following. Creatinine has stabilized, unclear if this will be his new baseline creatinine  Ethylene glycol and volatile compound panel negative  Given significantly elevated hep C RNA, concern for possible cryoglobulinemia, follow up cryoglobulins  Per nephrology, will need renal biopsy on Monday 8/19, should be going around 1400  Hold aspirin  Metabolic acidosis  Resolved  Continue PO bicarb  Acute metabolic encephalopathy  Suspect due to delirium from acute illness  and sleep disturbance at night  Also likely has a component of cognitive impairment at baseline - per son, patient is baseline A&Ox3 but is forgetful of smaller details like days of the week and exact city/location  Neurology following  Ammonia wnl, pCO2 wnl  CT head negative  MRI brain negative  EEG wnl  Continue trazodone nightly for sleep  Delirium precautions  Seems fairly stable today- no obvious confusion, but will continue to monitor  Hypertensive urgency  Cardiology following  Continue amlodipine, hydralazine, and coreg  Avoiding ACEi/ARBs for now due to renal failure  Workup suggestive of primary hyperaldosteronism  CT adrenals unremarkable  Improving   Renal cyst  Renal US showing complex cyst in the inferior pole of the left kidney  Will need outpatient follow up for complex renal cyst with urology   Chronic combined systolic and diastolic heart failure (Multi)  Echo showing EF 40-45% with diastolic dysfunction  Avoiding ACEi/ARB and diuretics due to renal failure  Management per cardiology/nephrology   Chronic hepatitis C without hepatic coma (Multi)  Evaluated by GI  Hep C viral load 592,000  Will need outpatient follow up with Dr. Mcgraw for Hep C treatment and liver surveillance   Elevated liver enzymes  Evaluated by GI - predominately hepatocellular  Has hx of EtOH use and Hep C  Improving   Tobacco use  Nicotine patch  Recommend cessation  Hypokalemia  Replace PRN  Resolved          Dispo: will need SNF. Anticipate discharge 1-2 days after biopsy, if/when cleared by nephrology.       Mary Batres DO

## 2024-08-19 NOTE — CARE PLAN
The patient's goals for the shift include  comfort and rest    The clinical goals for the shift include Better nutrition      Problem: Skin  Goal: Decreased wound size/increased tissue granulation at next dressing change  Outcome: Progressing  Goal: Participates in plan/prevention/treatment measures  Outcome: Progressing  Goal: Prevent/manage excess moisture  Outcome: Progressing  Goal: Prevent/minimize sheer/friction injuries  Outcome: Progressing  Goal: Promote/optimize nutrition  Outcome: Progressing  Goal: Promote skin healing  Outcome: Progressing     Problem: Fall/Injury  Goal: Not fall by end of shift  Outcome: Progressing  Goal: Be free from injury by end of the shift  Outcome: Progressing  Goal: Verbalize understanding of personal risk factors for fall in the hospital  Outcome: Progressing  Goal: Verbalize understanding of risk factor reduction measures to prevent injury from fall in the home  Outcome: Progressing  Goal: Use assistive devices by end of the shift  Outcome: Progressing  Goal: Pace activities to prevent fatigue by end of the shift  Outcome: Progressing     Problem: Renal Failure  Goal: I will maintain optimum fluid volume with treatment  Outcome: Progressing     Problem: Recent hospitalization or complication while living with HTN  Goal: Patient will effectively self-manage their HTN disease process  Outcome: Progressing

## 2024-08-19 NOTE — PROGRESS NOTES
Physical Therapy                 Therapy Communication Note    Patient Name: Estuardo El  MRN: [id number]  Today's Date: 8/19/2024     Discipline: Physical Therapy    Missed Visit Reason: Missed Visit Reason: Other (Comment) (Pt off the floor for liver biopsy.)    Missed Time: Attempt    Comment:

## 2024-08-19 NOTE — ASSESSMENT & PLAN NOTE
Nephrology following. Creatinine has stabilized, unclear if this will be his new baseline creatinine  Ethylene glycol and volatile compound panel negative  Given significantly elevated hep C RNA, concern for possible cryoglobulinemia, follow up cryoglobulins  Per nephrology, will need renal biopsy on Monday 8/19, should be going around 1400  Hold aspirin

## 2024-08-19 NOTE — ASSESSMENT & PLAN NOTE
Suspect due to delirium from acute illness and sleep disturbance at night  Also likely has a component of cognitive impairment at baseline - per son, patient is baseline A&Ox3 but is forgetful of smaller details like days of the week and exact city/location  Neurology following  Ammonia wnl, pCO2 wnl  CT head negative  MRI brain negative  EEG wnl  Continue trazodone nightly for sleep  Delirium precautions  Seems fairly stable today- no obvious confusion, but will continue to monitor

## 2024-08-19 NOTE — PROGRESS NOTES
"Occupational Therapy                 Therapy Communication Note    Patient Name: Estuardo El  MRN: [id number]  Today's Date: 8/19/2024     Discipline: Occupational Therapy    Missed Visit Reason: Missed Visit Reason: Patient refused (Treatment attempted with pt declining and expressing \"I dont feel like it\" despite max encouragement + education.)    Missed Time: Attempt    Comment:  "

## 2024-08-19 NOTE — NURSING NOTE
[id number] spoke with RN from interventional radiology . There is not a time for him to get biopsy today and RN instructed to change his diet order. RN will  notify patient  1050 RN holding patients breakfast tray incase Dr. Peralta hears back from IR.

## 2024-08-20 ENCOUNTER — DOCUMENTATION (OUTPATIENT)
Dept: CASE MANAGEMENT | Facility: HOSPITAL | Age: 74
End: 2024-08-20
Payer: MEDICARE

## 2024-08-20 LAB
ANION GAP SERPL CALC-SCNC: 10 MMOL/L
BUN SERPL-MCNC: 34 MG/DL (ref 8–25)
CALCIUM SERPL-MCNC: 9.2 MG/DL (ref 8.5–10.4)
CHLORIDE SERPL-SCNC: 101 MMOL/L (ref 97–107)
CO2 SERPL-SCNC: 24 MMOL/L (ref 24–31)
CREAT SERPL-MCNC: 2.8 MG/DL (ref 0.4–1.6)
EGFRCR SERPLBLD CKD-EPI 2021: 23 ML/MIN/1.73M*2
ERYTHROCYTE [DISTWIDTH] IN BLOOD BY AUTOMATED COUNT: 14.6 % (ref 11.5–14.5)
GLUCOSE SERPL-MCNC: 101 MG/DL (ref 65–99)
HCT VFR BLD AUTO: 40.5 % (ref 41–52)
HGB BLD-MCNC: 12.9 G/DL (ref 13.5–17.5)
MCH RBC QN AUTO: 32.3 PG (ref 26–34)
MCHC RBC AUTO-ENTMCNC: 31.9 G/DL (ref 32–36)
MCV RBC AUTO: 102 FL (ref 80–100)
NRBC BLD-RTO: 0 /100 WBCS (ref 0–0)
PLATELET # BLD AUTO: 145 X10*3/UL (ref 150–450)
POTASSIUM SERPL-SCNC: 5.3 MMOL/L (ref 3.4–5.1)
RBC # BLD AUTO: 3.99 X10*6/UL (ref 4.5–5.9)
SODIUM SERPL-SCNC: 135 MMOL/L (ref 133–145)
WBC # BLD AUTO: 7.9 X10*3/UL (ref 4.4–11.3)

## 2024-08-20 PROCEDURE — 97530 THERAPEUTIC ACTIVITIES: CPT | Mod: GP

## 2024-08-20 PROCEDURE — 97535 SELF CARE MNGMENT TRAINING: CPT | Mod: GO,CO

## 2024-08-20 PROCEDURE — 99232 SBSQ HOSP IP/OBS MODERATE 35: CPT | Performed by: HOSPITALIST

## 2024-08-20 PROCEDURE — 97110 THERAPEUTIC EXERCISES: CPT | Mod: GP

## 2024-08-20 PROCEDURE — 85027 COMPLETE CBC AUTOMATED: CPT | Performed by: HOSPITALIST

## 2024-08-20 PROCEDURE — 2500000004 HC RX 250 GENERAL PHARMACY W/ HCPCS (ALT 636 FOR OP/ED): Performed by: STUDENT IN AN ORGANIZED HEALTH CARE EDUCATION/TRAINING PROGRAM

## 2024-08-20 PROCEDURE — 36415 COLL VENOUS BLD VENIPUNCTURE: CPT | Performed by: HOSPITALIST

## 2024-08-20 PROCEDURE — 2060000001 HC INTERMEDIATE ICU ROOM DAILY

## 2024-08-20 PROCEDURE — 2500000001 HC RX 250 WO HCPCS SELF ADMINISTERED DRUGS (ALT 637 FOR MEDICARE OP): Performed by: STUDENT IN AN ORGANIZED HEALTH CARE EDUCATION/TRAINING PROGRAM

## 2024-08-20 PROCEDURE — 2500000002 HC RX 250 W HCPCS SELF ADMINISTERED DRUGS (ALT 637 FOR MEDICARE OP, ALT 636 FOR OP/ED): Performed by: HOSPITALIST

## 2024-08-20 PROCEDURE — 97530 THERAPEUTIC ACTIVITIES: CPT | Mod: GO,CO

## 2024-08-20 PROCEDURE — 80048 BASIC METABOLIC PNL TOTAL CA: CPT | Performed by: HOSPITALIST

## 2024-08-20 PROCEDURE — 2500000004 HC RX 250 GENERAL PHARMACY W/ HCPCS (ALT 636 FOR OP/ED): Performed by: INTERNAL MEDICINE

## 2024-08-20 PROCEDURE — S4991 NICOTINE PATCH NONLEGEND: HCPCS | Performed by: HOSPITALIST

## 2024-08-20 RX ADMIN — HEPARIN SODIUM 5000 UNITS: 5000 INJECTION, SOLUTION INTRAVENOUS; SUBCUTANEOUS at 05:53

## 2024-08-20 RX ADMIN — Medication 1 PATCH: at 08:36

## 2024-08-20 RX ADMIN — HEPARIN SODIUM 5000 UNITS: 5000 INJECTION, SOLUTION INTRAVENOUS; SUBCUTANEOUS at 20:37

## 2024-08-20 RX ADMIN — POLYETHYLENE GLYCOL 3350 17 G: 17 POWDER, FOR SOLUTION ORAL at 08:31

## 2024-08-20 RX ADMIN — TRAZODONE HYDROCHLORIDE 50 MG: 50 TABLET ORAL at 20:36

## 2024-08-20 RX ADMIN — HEPARIN SODIUM 5000 UNITS: 5000 INJECTION, SOLUTION INTRAVENOUS; SUBCUTANEOUS at 15:31

## 2024-08-20 RX ADMIN — AMLODIPINE BESYLATE 10 MG: 10 TABLET ORAL at 08:31

## 2024-08-20 RX ADMIN — SODIUM BICARBONATE 650 MG: 650 TABLET ORAL at 20:37

## 2024-08-20 RX ADMIN — HYDRALAZINE HYDROCHLORIDE 50 MG: 50 TABLET ORAL at 15:31

## 2024-08-20 RX ADMIN — CARVEDILOL 12.5 MG: 12.5 TABLET, FILM COATED ORAL at 17:03

## 2024-08-20 RX ADMIN — Medication 6 MG: at 20:37

## 2024-08-20 RX ADMIN — HYDRALAZINE HYDROCHLORIDE 50 MG: 50 TABLET ORAL at 20:36

## 2024-08-20 RX ADMIN — SODIUM BICARBONATE 650 MG: 650 TABLET ORAL at 08:31

## 2024-08-20 RX ADMIN — CARVEDILOL 12.5 MG: 12.5 TABLET, FILM COATED ORAL at 05:53

## 2024-08-20 RX ADMIN — HYDRALAZINE HYDROCHLORIDE 50 MG: 50 TABLET ORAL at 08:31

## 2024-08-20 ASSESSMENT — COGNITIVE AND FUNCTIONAL STATUS - GENERAL
MOVING TO AND FROM BED TO CHAIR: A LITTLE
MOVING FROM LYING ON BACK TO SITTING ON SIDE OF FLAT BED WITH BEDRAILS: A LITTLE
MOVING TO AND FROM BED TO CHAIR: A LITTLE
CLIMB 3 TO 5 STEPS WITH RAILING: TOTAL
WALKING IN HOSPITAL ROOM: A LOT
MOBILITY SCORE: 14
PERSONAL GROOMING: A LITTLE
DRESSING REGULAR UPPER BODY CLOTHING: A LITTLE
TOILETING: A LITTLE
EATING MEALS: A LITTLE
CLIMB 3 TO 5 STEPS WITH RAILING: TOTAL
MOBILITY SCORE: 14
MOVING FROM LYING ON BACK TO SITTING ON SIDE OF FLAT BED WITH BEDRAILS: A LITTLE
DRESSING REGULAR LOWER BODY CLOTHING: A LITTLE
TOILETING: A LITTLE
HELP NEEDED FOR BATHING: A LITTLE
EATING MEALS: A LITTLE
DAILY ACTIVITIY SCORE: 18
STANDING UP FROM CHAIR USING ARMS: A LITTLE
CLIMB 3 TO 5 STEPS WITH RAILING: TOTAL
DAILY ACTIVITIY SCORE: 18
STANDING UP FROM CHAIR USING ARMS: A LITTLE
TURNING FROM BACK TO SIDE WHILE IN FLAT BAD: A LOT
MOVING FROM LYING ON BACK TO SITTING ON SIDE OF FLAT BED WITH BEDRAILS: A LITTLE
PERSONAL GROOMING: A LITTLE
EATING MEALS: A LITTLE
WALKING IN HOSPITAL ROOM: A LOT
MOBILITY SCORE: 14
DRESSING REGULAR UPPER BODY CLOTHING: A LITTLE
HELP NEEDED FOR BATHING: A LITTLE
DRESSING REGULAR UPPER BODY CLOTHING: A LITTLE
TOILETING: A LITTLE
DAILY ACTIVITIY SCORE: 18
WALKING IN HOSPITAL ROOM: A LOT
DRESSING REGULAR LOWER BODY CLOTHING: A LITTLE
TURNING FROM BACK TO SIDE WHILE IN FLAT BAD: A LOT
HELP NEEDED FOR BATHING: A LITTLE
TURNING FROM BACK TO SIDE WHILE IN FLAT BAD: A LOT
STANDING UP FROM CHAIR USING ARMS: A LITTLE
MOVING TO AND FROM BED TO CHAIR: A LITTLE
DRESSING REGULAR LOWER BODY CLOTHING: A LITTLE
PERSONAL GROOMING: A LITTLE

## 2024-08-20 ASSESSMENT — PAIN SCALES - GENERAL
PAINLEVEL_OUTOF10: 0 - NO PAIN

## 2024-08-20 ASSESSMENT — PAIN - FUNCTIONAL ASSESSMENT
PAIN_FUNCTIONAL_ASSESSMENT: 0-10
PAIN_FUNCTIONAL_ASSESSMENT: 0-10

## 2024-08-20 ASSESSMENT — ACTIVITIES OF DAILY LIVING (ADL): HOME_MANAGEMENT_TIME_ENTRY: 13

## 2024-08-20 NOTE — PROGRESS NOTES
Estuardo El is a 74 y.o. male on day 9 of admission presenting with Acute renal failure, unspecified acute renal failure type (CMS-HCC).      Subjective   Patient reports that he has no pain. Up in chair, but says he is tired of sitting in chair. He reports he is tired. No pain today after biopsy.        Objective     Last Recorded Vitals  BP (!) 129/91 (BP Location: Left arm, Patient Position: Lying)   Pulse 84   Temp 36.6 °C (97.9 °F) (Temporal)   Resp 14   Wt 82.9 kg (182 lb 12.2 oz)   SpO2 98%   Intake/Output last 3 Shifts:    Intake/Output Summary (Last 24 hours) at 8/20/2024 1152  Last data filed at 8/20/2024 1000  Gross per 24 hour   Intake 240 ml   Output 600 ml   Net -360 ml       Admission Weight  Weight: 92.5 kg (203 lb 14.8 oz) (08/12/24 0047)    Daily Weight  08/20/24 : 82.9 kg (182 lb 12.2 oz)    Image Results  US guided percutaneous biopsy renal right  Narrative: Interpreted By:  Nata Richards,   STUDY:  US GUIDED PERCUTANEOUS BIOPSY RENAL RIGHT; 8/19/2024 3:11 pm      INDICATION:  Signs/Symptoms:Percutaneous cortical renal biopsy.      COMPARISON:  None.      ACCESSION NUMBER(S):  [id number]      ORDERING CLINICIAN:  MARLENA ALTMAN      TECHNIQUE:  PROCEDURE PERFORMED:  ULTRASOUND GUIDED  random right kidney BIOPSY ON  August 19, 2024.      PRE-PROCEDURE DIAGNOSIS:  Renal failure      POST-PROCEDURE DIAGNOSIS:  Pending  pathology      INDICATION FOR PROCEDURE:  The patient is a 74 year old  male who presents with renal failure.      STAFF RADIOLOGIST:  Nata Richards MD      ASSISTANT(S):  None      CONSENT:  The risks, benefits, treatment options, potential complications and  personnel involved were discussed with the  patient.  All questions  were answered and consent was obtained. The  patient indicated  he  was willing to proceed.  The staff physician personally verified  consent.      TIME OUT:  A time out was performed immediately prior to procedure start with  the nursing,  anesthesia and interventional team, correctly  identifying the patient name, date of birth, procedure, anatomy  (including marking of site and side), patient position, procedure  consent form, relevant diagnostic and radiology test results,  antibiotic administration (when indicated), safety precautions, and  procedure-specific equipment needs.      PROCEDURE:  After performing the time out, the right kidney was localized with  ultrasound, images were obtained and archived.  Skin entry site was  prepped and draped in the usual sterile fashion.  Under direct  ultrasound guidance,  3 passes  were made into the right renal cortex  using an  18 gauge cutting needle.      The procedure was performed by the: Attending radiologist without an  assistant      The attending radiologist performed the following procedural  activities: Entire procedure      ANESTHESIA:  Local anesthesia was achieved utilizing 10 cc 1% percent lidocaine.  Vital signs were monitored by the nurse.      START TIME/TIMEOUT TIME:  2:45 p.m.      END TIME:  3:15 p.m.      SEDATION TIME:  30 minutes.      COMPLICATIONS:  None      SIGN-OUT DISCUSSION:  Completed.      ESTIMATED BLOOD LOSS:  None.      CONTRAST:  None.      SPECIMENS:  3 core biopsy specimens were obtained, sent fresh to pathology and  reviewed and determined to be adequate.      BIOPSY DEVICE:  18 gauge core biopsy needle.      FINDINGS:  As described above      Impression: ULTRASOUND GUIDED RIGHT RENAL CORTICAL BIOPSY AS DESCRIBED.          Signed by: Nata Richards 8/19/2024 3:31 PM  Dictation workstation:   [id number]      Physical Exam  General: alert, no diaphoresis   Lungs: CTA BL   Heart: RRR, no LE edema BL   GI: abdomen soft, nontender, nondistended, BS present   MSK: no joint effusion or deformity   Skin: no rashes, erythema, or ecchymosis   Neuro: grossly normal cognition, motor strength, sensation     Relevant Results               Assessment/Plan                   Assessment & Plan  Acute renal failure, unspecified acute renal failure type (CMS-HCC)  Nephrology following. Creatinine has stabilized, unclear if this will be his new baseline creatinine  Ethylene glycol and volatile compound panel negative  Given significantly elevated hep C RNA, concern for possible cryoglobulinemia, follow up cryoglobulins  Biopsy completed 8/19. Expect prelim results later today. Discussed with Dr. Peralta. Okay to discharge patient starting tomorrow.  Hold aspirin  Metabolic acidosis  Resolved  Continue PO bicarb  Acute metabolic encephalopathy  Suspect due to delirium from acute illness and sleep disturbance at night  Also likely has a component of cognitive impairment at baseline - per son, patient is baseline A&Ox3 but is forgetful of smaller details like days of the week and exact city/location  Neurology following  Ammonia wnl, pCO2 wnl  CT head negative  MRI brain negative  EEG wnl  Continue trazodone nightly for sleep  Delirium precautions  Again stable tonight, patient alert. Hopefully resolved.  Hypertensive urgency  Continue amlodipine, hydralazine, and coreg  Avoiding ACEi/ARBs for now due to renal failure  Workup suggestive of primary hyperaldosteronism  CT adrenals unremarkable  Urgency resolved. 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 Mcgraw for Hep C treatment and liver surveillance   Elevated liver enzymes  Evaluated by GI - predominately hepatocellular  Has hx of EtOH use and Hep  C  Improving   Tobacco use  Nicotine patch  Recommend cessation  Hypokalemia  Replace PRN  Resolved       Dispo: stable for discharge to SNF starting tomorrow. Discussed with CM. She will start precert.              Mary Batres DO

## 2024-08-20 NOTE — PROGRESS NOTES
08/20/24 1315   Discharge Planning   Expected Discharge Disposition SNF  ([facility] Precert Status: Approved [facility] Auth. ID: [id number] Dates: 8/20/2024 - 8/22/2024)     MD WILL NEED TO SIGN/CERTIFY [facility] AT THE TIME OF DISCHARGE FOR SNF.   ** do not discharge without speaking to care coordination**

## 2024-08-20 NOTE — CARE PLAN
The patient's goals for the shift include comfort and rest    The clinical goals for the shift include VSS      Problem: Skin  Goal: Decreased wound size/increased tissue granulation at next dressing change  Outcome: Progressing  Goal: Participates in plan/prevention/treatment measures  Outcome: Progressing  Goal: Prevent/manage excess moisture  Outcome: Progressing  Goal: Prevent/minimize sheer/friction injuries  Outcome: Progressing  Goal: Promote/optimize nutrition  Outcome: Progressing  Goal: Promote skin healing  Outcome: Progressing     Problem: Fall/Injury  Goal: Not fall by end of shift  Outcome: Progressing  Goal: Be free from injury by end of the shift  Outcome: Progressing  Goal: Verbalize understanding of personal risk factors for fall in the hospital  Outcome: Progressing  Goal: Verbalize understanding of risk factor reduction measures to prevent injury from fall in the home  Outcome: Progressing  Goal: Use assistive devices by end of the shift  Outcome: Progressing  Goal: Pace activities to prevent fatigue by end of the shift  Outcome: Progressing     Problem: Renal Failure  Goal: I will maintain optimum fluid volume with treatment  Outcome: Progressing     Problem: Recent hospitalization or complication while living with HTN  Goal: Patient will effectively self-manage their HTN disease process  Outcome: Progressing

## 2024-08-20 NOTE — ASSESSMENT & PLAN NOTE
Continue amlodipine, hydralazine, and coreg  Avoiding ACEi/ARBs for now due to renal failure  Workup suggestive of primary hyperaldosteronism  CT adrenals unremarkable  Urgency resolved. Now on antihypertensive regimen

## 2024-08-20 NOTE — ASSESSMENT & PLAN NOTE
Suspect due to delirium from acute illness and sleep disturbance at night  Also likely has a component of cognitive impairment at baseline - per son, patient is baseline A&Ox3 but is forgetful of smaller details like days of the week and exact city/location  Neurology following  Ammonia wnl, pCO2 wnl  CT head negative  MRI brain negative  EEG wnl  Continue trazodone nightly for sleep  Delirium precautions  Again stable tonight, patient alert. Hopefully resolved.

## 2024-08-20 NOTE — PROGRESS NOTES
Occupational Therapy    OT Treatment    Patient Name: Estuardo El  MRN: [id number]  Today's Date: 8/20/2024  Time Calculation  Start Time: 0918  Stop Time: 0941  Time Calculation (min): 23 min        Assessment:  OT Assessment: Tolerated session fairly, demonstrating progression towards POC, requiring encouragement for participation. Pt would benefit from continued skilled OT services to improve strength, balance, and functional tolerance to increase independence with ADL tasks  End of Session Communication: Bedside nurse  End of Session Patient Position: Up in chair, Alarm on  OT Assessment Results: Decreased ADL status, Decreased endurance, Decreased functional mobility, Decreased IADLs  Plan:  Treatment Interventions: ADL retraining, Functional transfer training, UE strengthening/ROM, Endurance training, Cognitive reorientation, Patient/family training, Equipment evaluation/education, Compensatory technique education  OT Frequency: 3 times per week  OT Discharge Recommendations: Moderate intensity level of continued care  Equipment Recommended upon Discharge: Wheeled walker  OT Recommended Transfer Status: Moderate assist, Assist of 2  OT - OK to Discharge: Yes  Treatment Interventions: ADL retraining, Functional transfer training, UE strengthening/ROM, Endurance training, Cognitive reorientation, Patient/family training, Equipment evaluation/education, Compensatory technique education    Subjective   Previous Visit Info:  OT Last Visit  OT Received On: 08/20/24  General:  General  Prior to Session Communication: Bedside nurse  Patient Position Received: Bed, 3 rail up, Alarm off, not on at start of session  General Comment: Cleared for therapy per RN. Pt supine in bed upon arrival and agreeable to tx  Precautions:  Medical Precautions: Fall precautions    Pain:  Pain Assessment  Pain Assessment: 0-10  0-10 (Numeric) Pain Score: 0 - No pain    Objective    Cognition:  Cognition  Overall Cognitive Status: Within  Functional Limits  Cognition Comments: flat affect  Insight: Mild  Processing Speed: Delayed    Activities of Daily Living:    Grooming  Grooming Level of Assistance: Setup  Grooming Where Assessed: Chair  Grooming Comments: face washing and hair brushing tasks, declining oral hygiene    UE Dressing  UE Dressing Level of Assistance: Minimum assistance  UE Dressing Where Assessed: Chair  UE Dressing Comments: don/doff gown    LE Dressing  LE Dressing: Yes  Pants Level of Assistance: Setup, Close supervision  Sock Level of Assistance: Minimum assistance  LE Dressing Where Assessed: Chair  LE Dressing Comments: assist to don pants over feet with pt able to pull up and don over hips in standing. Pt donning socks with use of figure four dressing technique    Bed Mobility/Transfers: Bed Mobility  Bed Mobility: Yes  Bed Mobility 1  Bed Mobility 1: Supine to sitting  Level of Assistance 1: Close supervision  Bed Mobility Comments 1: increased effort supine>sit with cues to scoot hips to EOB    Transfers  Transfer: Yes  Transfer 1  Technique 1: Sit to stand, Stand to sit  Transfer Device 1: Walker  Transfer Level of Assistance 1: Minimum assistance  Trials/Comments 1: assist for trunk elevation and forward weight shifting with cues for proper hand placement, demonstrating decreased eccentric lowering to chair    Functional Mobility:  Functional Mobility  Functional Mobility Performed: Yes  Functional Mobility 1  Device 1: Rolling walker  Assistance 1: Minimum assistance  Comments 1: tolerated taking steps to chair at FWW with cues for postural alignment, demonstrating fair balance    Standing Balance:  Static Standing Balance  Static Standing-Level of Assistance: Minimum assistance  Static Standing-Comment/Number of Minutes: fair- balance during transfer task    Outcome Measures:[facility] Daily Activity  Putting on and taking off regular lower body clothing: A little  Bathing (including washing, rinsing, drying): A  little  Putting on and taking off regular upper body clothing: A little  Toileting, which includes using toilet, bedpan or urinal: A little  Taking care of personal grooming such as brushing teeth: A little  Eating Meals: A little  Daily Activity - Total Score: 18    Education Documentation  Precautions, taught by KRISTINA Viera at 8/20/2024 11:56 AM.  Learner: Patient  Readiness: Acceptance  Method: Explanation  Response: Verbalizes Understanding, Needs Reinforcement    ADL Training, taught by KRISTINA Viera at 8/20/2024 11:56 AM.  Learner: Patient  Readiness: Acceptance  Method: Explanation  Response: Verbalizes Understanding, Needs Reinforcement    Education Comments  No comments found.        Problem: OT Goals  Goal: ADLs  Description: Pt will complete ADL tasks with Mod I, using AE as needed, in order to complete self-care tasks.    Outcome: Progressing  Goal: Functional transfers  Outcome: Progressing  Goal: Functional mobility  Description: Pt will perform functional mobility household distance at mod ind level with RW    Outcome: Progressing

## 2024-08-20 NOTE — ASSESSMENT & PLAN NOTE
Nephrology following. Creatinine has stabilized, unclear if this will be his new baseline creatinine  Ethylene glycol and volatile compound panel negative  Given significantly elevated hep C RNA, concern for possible cryoglobulinemia, follow up cryoglobulins  Biopsy completed 8/19. Expect prelim results later today. Discussed with Dr. Peralta. Okay to discharge patient starting tomorrow.  Hold aspirin

## 2024-08-20 NOTE — PROGRESS NOTES
CHF Clinical Nurse Navigator Documentation    Congestive Heart Failure disease education was performed by the Clinical Nurse Navigator with a good understanding: Pt is aware and A&O but his affect is flat and detached although he answers question asked appropriately.      Living With Heart Failure Education booklet provided:  Yes.  CHF signs and symptoms discussed and when to call cardiologist:  Yes.  Compliant with home medications: States he takes only tylenol at home.   Low sodium nutrition education reviewed: Yes.  Fluid Restriction Education reviewed: No.  Daily Weight Education reviewed: Yes. He has no scale and states he has no way to obtain one.   EF: 40-45%    Cardiovascular Rehab Referral ordered:  No.  Follow-up with Cardiologist after discharge education provided: Does not currently have a cardiologist. He was seen by Britni and I will schedule a follow up with Dr. Campbell in [place] in 2 weeks.    Consult to HF Cardiology: No.     Meds to Beds discussed: No.   Meds to Beds Opt In charted: Yes, pt will be DC to SNF so M2B will no be used.

## 2024-08-20 NOTE — PROGRESS NOTES
Physical Therapy    Physical Therapy Treatment    Patient Name: Estuardo El  MRN: [id number]  Today's Date: 8/20/2024  Time Calculation  Start Time: 1049  Stop Time: 1112  Time Calculation (min): 23 min    Assessment/Plan   PT Assessment  End of Session Communication: Bedside nurse  Assessment Comment: pt required contact guard to min assist of 1 for the above limited mobility; + fatigues easily with  functional mobility; + weakness tom LE's; needs encouragement for out of bed activities  End of Session Patient Position: Bed, 3 rail up, Alarm on (call button in reach)  PT Plan  Inpatient/Swing Bed or Outpatient: Inpatient  PT Plan  Treatment/Interventions: Bed mobility, Transfer training, Gait training, Balance training, Strengthening, Endurance training, Therapeutic exercise, Therapeutic activity  PT Plan: Ongoing PT  PT Frequency: 4 times per week  PT Discharge Recommendations: Moderate intensity level of continued care  Equipment Recommended upon Discharge: Wheeled walker  PT Recommended Transfer Status: Assist x1, Assistive device (FWW)  PT - OK to Discharge: Yes      General Visit Information:   PT  Visit  PT Received On: 08/20/24  General  Family/Caregiver Present: No  Prior to Session Communication: Bedside nurse  Patient Position Received: Up in chair, Alarm on  Preferred Learning Style: verbal, visual  General Comment: cleared by nurse for therapy; pt agreeable to therapy; + purewick, telemetry    Subjective   Precautions:  Precautions  Hearing/Visual Limitations: WFL  Medical Precautions: Fall precautions  Vital Signs:  Vital Signs  Heart Rate: 84  SpO2: 95 %  Patient Position: Sitting    Objective   Pain:  Pain Assessment  Pain Assessment: 0-10  0-10 (Numeric) Pain Score: 0 - No pain  Cognition:  Cognition  Overall Cognitive Status: Within Functional Limits  Orientation Level: Oriented X4  Sustained Attention: Impaired  Safety/Judgement:  (decreased safety insight during functional mobility)  Insight:  Mild  Processing Speed: Delayed  Coordination:  Movements are Fluid and Coordinated: No  Heel to Shin:  (slow rate of movement; + weakness)  Postural Control:  Postural Control  Posture Comment: mild forward head, protracted shldrs, mild forward flexed  Extremity/Trunk Assessments:    Activity Tolerance:  Activity Tolerance  Endurance: Decreased tolerance for upright activites  Activity Tolerance Comments: fair  Treatments:  Therapeutic Exercise  Therapeutic Exercise Performed: Yes  Therapeutic Exercise Activity 1: seated tom AP/heel raises x 15 reps each  Therapeutic Exercise Activity 2: seated tom LAQ's x 15 reps  Therapeutic Exercise Activity 3: seatedbil marching x 15 reps  Therapeutic Exercise Activity 4: seated tom hip abd/add x 15 reps  Therapeutic Exercise Activity 5: standing unilateral UE elevation x 5 reps each  Therapeutic Exercise Activity 6: supine bridging x 3 reps    Therapeutic Activity  Therapeutic Activity Performed: Yes (see transfers, amb with FWW, bed mobility comments)    Bed Mobility  Bed Mobility: Yes  Bed Mobility 1  Bed Mobility 1: Sitting to supine  Level of Assistance 1: Close supervision  Bed Mobility Comments 1: head of bed slightly elevated; minimal difficulty observed    Ambulation/Gait Training  Ambulation/Gait Training Performed: Yes  Ambulation/Gait Training 1  Surface 1: Level tile  Device 1: Rolling walker  Assistance 1: Minimum assistance, Minimal verbal cues  Quality of Gait 1: Decreased step length, Diminished heel strike, Forward flexed posture  Comments/Distance (ft) 1: pt stood with FWW, contact guard of 1 x approx 2 min, verbal cues for erect posture, unilateral UE elevation as documented prior; pt then amb chair to bed, FWW, + turn, approx 10 steps, min assist of 1 to steady, verbal cues for erect posture, increased step height. Overall balance fair + at best.  Transfers  Transfer: Yes  Transfer 1  Transfer From 1: Sit to  Transfer to 1: Stand  Technique 1: Sit to  stand  Transfer Device 1: Walker  Transfer Level of Assistance 1: Minimum assistance, Minimal verbal cues  Trials/Comments 1: min assist of 1 for trunk up, verbal cues for proper tom hand placement on arms of chair  Transfers 2  Transfer From 2: Stand to  Transfer to 2: Sit  Technique 2: Stand to sit  Transfer Device 2: Walker  Transfer Level of Assistance 2: Minimum assistance, Minimal verbal cues  Trials/Comments 2: min assist of 1 for trunk down, verbal cues for reaching back for bed with both hands    Stairs  Stairs: No    Outcome Measures:  [facility] Basic Mobility  Turning from your back to your side while in a flat bed without using bedrails: A little  Moving from lying on your back to sitting on the side of a flat bed without using bedrails: A lot  Moving to and from bed to chair (including a wheelchair): A little  Standing up from a chair using your arms (e.g. wheelchair or bedside chair): A little  To walk in hospital room: A lot  Climbing 3-5 steps with railing: Total  Basic Mobility - Total Score: 14    Education Documentation  Mobility Training, taught by Yoana Suazo PT at 8/20/2024 11:32 AM.  Learner: Patient  Readiness: Acceptance  Method: Explanation, Demonstration  Response: Needs Reinforcement    Education Comments  No comments found.        OP EDUCATION:       Encounter Problems       Encounter Problems (Active)       Mobility       Bed mobility including supine to sit and sit to supine with min assist. (Progressing)       Start:  08/12/24    Expected End:  08/26/24            Transfers including sit to stand and stand to sit with min assist. (Met)       Start:  08/12/24    Expected End:  08/26/24    Resolved:  08/16/24    Updated to: Transfers including sit<>stand with LRAD and mod I.    Update reason: new goal required         Ambulate 50 feet with rolling walker and min assist. (Progressing)       Start:  08/12/24    Expected End:  08/26/24            Transfers including sit<>stand with LRAD and  mod I. (Progressing)       Start:  08/16/24    Expected End:  08/26/24

## 2024-08-20 NOTE — CARE PLAN
The patient's goals for the shift include      The clinical goals for the shift include work with OT without incident

## 2024-08-20 NOTE — PROGRESS NOTES
"Estuardo El is a 74 y.o. male on day 9 of admission presenting with Acute renal failure, unspecified acute renal failure type (CMS-HCC).    Subjective   Seen for acute kidney injury he does have hepatitis C he underwent renal biopsy yesterday tolerated procedure very well with still waiting for results clinically he feels okay without any new complaints       Objective     Physical Exam  Neck:      Vascular: No carotid bruit.   Cardiovascular:      Rate and Rhythm: Normal rate and regular rhythm.      Heart sounds: No murmur heard.     No friction rub. No gallop.   Pulmonary:      Breath sounds: No wheezing, rhonchi or rales.   Chest:      Chest wall: No tenderness.   Abdominal:      General: There is no distension.      Tenderness: There is no abdominal tenderness. There is no guarding or rebound.   Musculoskeletal:         General: No swelling or tenderness.      Cervical back: Neck supple.      Right lower leg: No edema.      Left lower leg: No edema.   Lymphadenopathy:      Cervical: No cervical adenopathy.         Last Recorded Vitals  Blood pressure 147/79, pulse 78, temperature 36.8 °C (98.2 °F), temperature source Temporal, resp. rate 14, height 1.778 m (5' 10\"), weight 82.9 kg (182 lb 12.2 oz), SpO2 97%.    Intake/Output last 3 Shifts:  I/O last 3 completed shifts:  In: 0 (0 mL/kg)   Out: 1600 (19.3 mL/kg) [Urine:1600 (0.5 mL/kg/hr)]  Weight: 82.9 kg     Current Facility-Administered Medications:     acetaminophen (Tylenol) tablet 650 mg, 650 mg, oral, q6h PRN, Mary Batres DO, 650 mg at 08/16/24 1519    amLODIPine (Norvasc) tablet 10 mg, 10 mg, oral, Daily, Kayla Boyd MD, 10 mg at 08/20/24 0831    [Held by provider] aspirin EC tablet 81 mg, 81 mg, oral, Daily, Mary Batres DO, 81 mg at 08/11/24 0942    carvedilol (Coreg) tablet 12.5 mg, 12.5 mg, oral, BID, Kayla Boyd MD, 12.5 mg at 08/20/24 0553    heparin (porcine) injection 5,000 Units, 5,000 Units, subcutaneous, q8h, Kayla VELASQUEZ" MD Oliver, 5,000 Units at 08/20/24 0553    hydrALAZINE (Apresoline) tablet 50 mg, 50 mg, oral, TID, Kayla Boyd MD, 50 mg at 08/20/24 0831    melatonin tablet 6 mg, 6 mg, oral, Nightly, Kayla Boyd MD, 6 mg at 08/19/24 2139    nicotine (Nicoderm CQ) 14 mg/24 hr patch 1 patch, 1 patch, transdermal, Daily, Mary Batres DO, 1 patch at 08/20/24 0836    ondansetron (Zofran) injection 4 mg, 4 mg, intravenous, q6h PRN, Mary Batres DO    perflutren protein A microsphere (Optison) injection 0.5 mL, 0.5 mL, intravenous, Once in imaging, Mary Batres DO    polyethylene glycol (Glycolax, Miralax) packet 17 g, 17 g, oral, Daily, Kayla Boyd MD, 17 g at 08/20/24 0831    sodium bicarbonate tablet 650 mg, 650 mg, oral, BID, René Peralta MD, 650 mg at 08/20/24 0831    sulfur hexafluoride microsphr (Lumason) injection 24.28 mg, 2 mL, intravenous, Once in imaging, Mary Batres DO    traZODone (Desyrel) tablet 50 mg, 50 mg, oral, Nightly, Kayla Boyd MD, 50 mg at 08/19/24 2139   Relevant Results    Results for orders placed or performed during the hospital encounter of 08/11/24 (from the past 96 hour(s))   POCT GLUCOSE   Result Value Ref Range    POCT Glucose 110 (H) 74 - 99 mg/dL   POCT GLUCOSE   Result Value Ref Range    POCT Glucose 144 (H) 74 - 99 mg/dL   CBC   Result Value Ref Range    WBC 8.7 4.4 - 11.3 x10*3/uL    nRBC 0.0 0.0 - 0.0 /100 WBCs    RBC 3.75 (L) 4.50 - 5.90 x10*6/uL    Hemoglobin 12.3 (L) 13.5 - 17.5 g/dL    Hematocrit 36.7 (L) 41.0 - 52.0 %    MCV 98 80 - 100 fL    MCH 32.8 26.0 - 34.0 pg    MCHC 33.5 32.0 - 36.0 g/dL    RDW 15.4 (H) 11.5 - 14.5 %    Platelets 112 (L) 150 - 450 x10*3/uL   Comprehensive Metabolic Panel   Result Value Ref Range    Glucose 89 65 - 99 mg/dL    Sodium 140 133 - 145 mmol/L    Potassium 4.7 3.4 - 5.1 mmol/L    Chloride 105 97 - 107 mmol/L    Bicarbonate 25 24 - 31 mmol/L    Urea Nitrogen 44 (H) 8 - 25 mg/dL    Creatinine 2.90 (H) 0.40 - 1.60  mg/dL    eGFR 22 (L) >60 mL/min/1.73m*2    Calcium 8.9 8.5 - 10.4 mg/dL    Albumin 3.3 (L) 3.5 - 5.0 g/dL    Alkaline Phosphatase 60 35 - 125 U/L    Total Protein 6.4 5.9 - 7.9 g/dL    AST 42 (H) 5 - 40 U/L    Bilirubin, Total 0.5 0.1 - 1.2 mg/dL    ALT 96 (H) 5 - 40 U/L    Anion Gap 10 <=19 mmol/L   CBC   Result Value Ref Range    WBC 8.1 4.4 - 11.3 x10*3/uL    nRBC 0.0 0.0 - 0.0 /100 WBCs    RBC 3.81 (L) 4.50 - 5.90 x10*6/uL    Hemoglobin 12.6 (L) 13.5 - 17.5 g/dL    Hematocrit 37.9 (L) 41.0 - 52.0 %     80 - 100 fL    MCH 33.1 26.0 - 34.0 pg    MCHC 33.2 32.0 - 36.0 g/dL    RDW 15.3 (H) 11.5 - 14.5 %    Platelets 119 (L) 150 - 450 x10*3/uL   Comprehensive Metabolic Panel   Result Value Ref Range    Glucose 82 65 - 99 mg/dL    Sodium 139 133 - 145 mmol/L    Potassium 4.9 3.4 - 5.1 mmol/L    Chloride 104 97 - 107 mmol/L    Bicarbonate 25 24 - 31 mmol/L    Urea Nitrogen 39 (H) 8 - 25 mg/dL    Creatinine 2.70 (H) 0.40 - 1.60 mg/dL    eGFR 24 (L) >60 mL/min/1.73m*2    Calcium 9.1 8.5 - 10.4 mg/dL    Albumin 3.3 (L) 3.5 - 5.0 g/dL    Alkaline Phosphatase 62 35 - 125 U/L    Total Protein 6.6 5.9 - 7.9 g/dL    AST 40 5 - 40 U/L    Bilirubin, Total 0.5 0.1 - 1.2 mg/dL    ALT 81 (H) 5 - 40 U/L    Anion Gap 10 <=19 mmol/L   Magnesium   Result Value Ref Range    Magnesium 2.30 1.60 - 3.10 mg/dL   Comprehensive Metabolic Panel   Result Value Ref Range    Glucose 92 65 - 99 mg/dL    Sodium 138 133 - 145 mmol/L    Potassium 5.1 3.4 - 5.1 mmol/L    Chloride 104 97 - 107 mmol/L    Bicarbonate 23 (L) 24 - 31 mmol/L    Urea Nitrogen 37 (H) 8 - 25 mg/dL    Creatinine 2.80 (H) 0.40 - 1.60 mg/dL    eGFR 23 (L) >60 mL/min/1.73m*2    Calcium 9.3 8.5 - 10.4 mg/dL    Albumin 3.4 (L) 3.5 - 5.0 g/dL    Alkaline Phosphatase 66 35 - 125 U/L    Total Protein 6.6 5.9 - 7.9 g/dL    AST 43 (H) 5 - 40 U/L    Bilirubin, Total 0.5 0.1 - 1.2 mg/dL    ALT 74 (H) 5 - 40 U/L    Anion Gap 11 <=19 mmol/L   CBC and Auto Differential   Result Value  Ref Range    WBC 7.6 4.4 - 11.3 x10*3/uL    nRBC 0.0 0.0 - 0.0 /100 WBCs    RBC 3.80 (L) 4.50 - 5.90 x10*6/uL    Hemoglobin 12.5 (L) 13.5 - 17.5 g/dL    Hematocrit 38.5 (L) 41.0 - 52.0 %     (H) 80 - 100 fL    MCH 32.9 26.0 - 34.0 pg    MCHC 32.5 32.0 - 36.0 g/dL    RDW 15.2 (H) 11.5 - 14.5 %    Platelets 141 (L) 150 - 450 x10*3/uL    Neutrophils % 64.8 40.0 - 80.0 %    Immature Granulocytes %, Automated 0.4 0.0 - 0.9 %    Lymphocytes % 18.3 13.0 - 44.0 %    Monocytes % 14.8 2.0 - 10.0 %    Eosinophils % 1.2 0.0 - 6.0 %    Basophils % 0.5 0.0 - 2.0 %    Neutrophils Absolute 4.92 1.60 - 5.50 x10*3/uL    Immature Granulocytes Absolute, Automated 0.03 0.00 - 0.50 x10*3/uL    Lymphocytes Absolute 1.39 0.80 - 3.00 x10*3/uL    Monocytes Absolute 1.12 (H) 0.05 - 0.80 x10*3/uL    Eosinophils Absolute 0.09 0.00 - 0.40 x10*3/uL    Basophils Absolute 0.04 0.00 - 0.10 x10*3/uL   Basic Metabolic Panel   Result Value Ref Range    Glucose 101 (H) 65 - 99 mg/dL    Sodium 135 133 - 145 mmol/L    Potassium 5.3 (H) 3.4 - 5.1 mmol/L    Chloride 101 97 - 107 mmol/L    Bicarbonate 24 24 - 31 mmol/L    Urea Nitrogen 34 (H) 8 - 25 mg/dL    Creatinine 2.80 (H) 0.40 - 1.60 mg/dL    eGFR 23 (L) >60 mL/min/1.73m*2    Calcium 9.2 8.5 - 10.4 mg/dL    Anion Gap 10 <=19 mmol/L   CBC   Result Value Ref Range    WBC 7.9 4.4 - 11.3 x10*3/uL    nRBC 0.0 0.0 - 0.0 /100 WBCs    RBC 3.99 (L) 4.50 - 5.90 x10*6/uL    Hemoglobin 12.9 (L) 13.5 - 17.5 g/dL    Hematocrit 40.5 (L) 41.0 - 52.0 %     (H) 80 - 100 fL    MCH 32.3 26.0 - 34.0 pg    MCHC 31.9 (L) 32.0 - 36.0 g/dL    RDW 14.6 (H) 11.5 - 14.5 %    Platelets 145 (L) 150 - 450 x10*3/uL       Assessment/Plan     Acute kidney injury for renal biopsy awaiting for results continue to monitor  Hypertensive urgency blood pressure is under control now  Congestive heart failure compensated  Hepatitis C  BPH  Metabolic acidosis  Elevated liver enzymes  Encephalopathy improving           Ayo BENDER  MD Kathryn

## 2024-08-21 ENCOUNTER — APPOINTMENT (OUTPATIENT)
Dept: RADIOLOGY | Facility: HOSPITAL | Age: 74
End: 2024-08-21
Payer: MEDICARE

## 2024-08-21 PROBLEM — R31.0 GROSS HEMATURIA: Status: ACTIVE | Noted: 2024-08-21

## 2024-08-21 PROBLEM — E87.5 HYPERKALEMIA: Status: ACTIVE | Noted: 2024-08-21

## 2024-08-21 LAB
ANION GAP SERPL CALC-SCNC: 10 MMOL/L
BUN SERPL-MCNC: 35 MG/DL (ref 8–25)
CALCIUM SERPL-MCNC: 9.8 MG/DL (ref 8.5–10.4)
CHLORIDE SERPL-SCNC: 102 MMOL/L (ref 97–107)
CO2 SERPL-SCNC: 22 MMOL/L (ref 24–31)
CREAT SERPL-MCNC: 3 MG/DL (ref 0.4–1.6)
EGFRCR SERPLBLD CKD-EPI 2021: 21 ML/MIN/1.73M*2
ELECTRONICALLY SIGNED BY: NORMAL
ERYTHROCYTE [DISTWIDTH] IN BLOOD BY AUTOMATED COUNT: 14.6 % (ref 11.5–14.5)
GLUCOSE SERPL-MCNC: 91 MG/DL (ref 65–99)
HCT VFR BLD AUTO: 38.7 % (ref 41–52)
HCV GENTYP SERPL SEQ: NORMAL
HEPATITIS C INTERPRETATION: NORMAL
HGB BLD-MCNC: 12.9 G/DL (ref 13.5–17.5)
MCH RBC QN AUTO: 33.1 PG (ref 26–34)
MCHC RBC AUTO-ENTMCNC: 33.3 G/DL (ref 32–36)
MCV RBC AUTO: 99 FL (ref 80–100)
NRBC BLD-RTO: 0 /100 WBCS (ref 0–0)
PLATELET # BLD AUTO: 152 X10*3/UL (ref 150–450)
POTASSIUM SERPL-SCNC: 5.4 MMOL/L (ref 3.4–5.1)
RBC # BLD AUTO: 3.9 X10*6/UL (ref 4.5–5.9)
SODIUM SERPL-SCNC: 134 MMOL/L (ref 133–145)
WBC # BLD AUTO: 7 X10*3/UL (ref 4.4–11.3)

## 2024-08-21 PROCEDURE — 2500000002 HC RX 250 W HCPCS SELF ADMINISTERED DRUGS (ALT 637 FOR MEDICARE OP, ALT 636 FOR OP/ED): Performed by: HOSPITALIST

## 2024-08-21 PROCEDURE — 2500000004 HC RX 250 GENERAL PHARMACY W/ HCPCS (ALT 636 FOR OP/ED): Performed by: INTERNAL MEDICINE

## 2024-08-21 PROCEDURE — 2500000001 HC RX 250 WO HCPCS SELF ADMINISTERED DRUGS (ALT 637 FOR MEDICARE OP): Performed by: STUDENT IN AN ORGANIZED HEALTH CARE EDUCATION/TRAINING PROGRAM

## 2024-08-21 PROCEDURE — 2500000004 HC RX 250 GENERAL PHARMACY W/ HCPCS (ALT 636 FOR OP/ED): Performed by: STUDENT IN AN ORGANIZED HEALTH CARE EDUCATION/TRAINING PROGRAM

## 2024-08-21 PROCEDURE — 36415 COLL VENOUS BLD VENIPUNCTURE: CPT | Performed by: HOSPITALIST

## 2024-08-21 PROCEDURE — 85027 COMPLETE CBC AUTOMATED: CPT | Performed by: HOSPITALIST

## 2024-08-21 PROCEDURE — 97530 THERAPEUTIC ACTIVITIES: CPT | Mod: GP

## 2024-08-21 PROCEDURE — 97110 THERAPEUTIC EXERCISES: CPT | Mod: GP

## 2024-08-21 PROCEDURE — 2500000005 HC RX 250 GENERAL PHARMACY W/O HCPCS: Performed by: INTERNAL MEDICINE

## 2024-08-21 PROCEDURE — 99239 HOSP IP/OBS DSCHRG MGMT >30: CPT | Performed by: HOSPITALIST

## 2024-08-21 PROCEDURE — S4991 NICOTINE PATCH NONLEGEND: HCPCS | Performed by: HOSPITALIST

## 2024-08-21 PROCEDURE — 2500000002 HC RX 250 W HCPCS SELF ADMINISTERED DRUGS (ALT 637 FOR MEDICARE OP, ALT 636 FOR OP/ED): Performed by: INTERNAL MEDICINE

## 2024-08-21 PROCEDURE — 80048 BASIC METABOLIC PNL TOTAL CA: CPT | Performed by: HOSPITALIST

## 2024-08-21 PROCEDURE — 2060000001 HC INTERMEDIATE ICU ROOM DAILY

## 2024-08-21 RX ORDER — DEXTROSE 50 % IN WATER (D50W) INTRAVENOUS SYRINGE
25 ONCE
Status: COMPLETED | OUTPATIENT
Start: 2024-08-21 | End: 2024-08-21

## 2024-08-21 RX ADMIN — HYDRALAZINE HYDROCHLORIDE 50 MG: 50 TABLET ORAL at 21:19

## 2024-08-21 RX ADMIN — CARVEDILOL 12.5 MG: 12.5 TABLET, FILM COATED ORAL at 17:36

## 2024-08-21 RX ADMIN — TRAZODONE HYDROCHLORIDE 50 MG: 50 TABLET ORAL at 21:18

## 2024-08-21 RX ADMIN — HEPARIN SODIUM 5000 UNITS: 5000 INJECTION, SOLUTION INTRAVENOUS; SUBCUTANEOUS at 16:08

## 2024-08-21 RX ADMIN — AMLODIPINE BESYLATE 10 MG: 10 TABLET ORAL at 08:52

## 2024-08-21 RX ADMIN — Medication 1 PATCH: at 08:52

## 2024-08-21 RX ADMIN — INSULIN HUMAN 10 UNITS: 100 INJECTION, SOLUTION PARENTERAL at 12:43

## 2024-08-21 RX ADMIN — HYDRALAZINE HYDROCHLORIDE 50 MG: 50 TABLET ORAL at 08:51

## 2024-08-21 RX ADMIN — SODIUM BICARBONATE 650 MG: 650 TABLET ORAL at 21:18

## 2024-08-21 RX ADMIN — HEPARIN SODIUM 5000 UNITS: 5000 INJECTION, SOLUTION INTRAVENOUS; SUBCUTANEOUS at 23:36

## 2024-08-21 RX ADMIN — SODIUM ZIRCONIUM CYCLOSILICATE 10 G: 10 POWDER, FOR SUSPENSION ORAL at 12:43

## 2024-08-21 RX ADMIN — DEXTROSE MONOHYDRATE 25 G: 25 INJECTION, SOLUTION INTRAVENOUS at 12:44

## 2024-08-21 RX ADMIN — Medication 6 MG: at 21:19

## 2024-08-21 RX ADMIN — HEPARIN SODIUM 5000 UNITS: 5000 INJECTION, SOLUTION INTRAVENOUS; SUBCUTANEOUS at 06:20

## 2024-08-21 RX ADMIN — CARVEDILOL 12.5 MG: 12.5 TABLET, FILM COATED ORAL at 06:20

## 2024-08-21 RX ADMIN — HYDRALAZINE HYDROCHLORIDE 50 MG: 50 TABLET ORAL at 16:08

## 2024-08-21 RX ADMIN — SODIUM BICARBONATE 650 MG: 650 TABLET ORAL at 08:51

## 2024-08-21 ASSESSMENT — COGNITIVE AND FUNCTIONAL STATUS - GENERAL
MOVING FROM LYING ON BACK TO SITTING ON SIDE OF FLAT BED WITH BEDRAILS: A LITTLE
TURNING FROM BACK TO SIDE WHILE IN FLAT BAD: A LITTLE
WALKING IN HOSPITAL ROOM: A LITTLE
CLIMB 3 TO 5 STEPS WITH RAILING: TOTAL
MOVING TO AND FROM BED TO CHAIR: A LITTLE
STANDING UP FROM CHAIR USING ARMS: A LITTLE
MOBILITY SCORE: 16

## 2024-08-21 ASSESSMENT — PAIN SCALES - GENERAL
PAINLEVEL_OUTOF10: 0 - NO PAIN

## 2024-08-21 ASSESSMENT — PAIN - FUNCTIONAL ASSESSMENT
PAIN_FUNCTIONAL_ASSESSMENT: 0-10
PAIN_FUNCTIONAL_ASSESSMENT: 0-10
PAIN_FUNCTIONAL_ASSESSMENT: FLACC (FACE, LEGS, ACTIVITY, CRY, CONSOLABILITY)

## 2024-08-21 NOTE — PROGRESS NOTES
08/21/24 1328   Discharge Planning   Expected Discharge Disposition SNF  ([facility] approved)     ADOD tomorrow   Facility updated     ** do not discharge without speaking to care coordination**

## 2024-08-21 NOTE — PROGRESS NOTES
"Estuardo El is a 74 y.o. male on day 10 of admission presenting with Acute renal failure, unspecified acute renal failure type (CMS-HCC).    Subjective   Seen for acute kidney injury he does have hepatitis C he underwent renal biopsy yesterday tolerated procedure very well with still waiting for results clinically he feels okay without any new complaints       Objective     Physical Exam  Neck:      Vascular: No carotid bruit.   Cardiovascular:      Rate and Rhythm: Normal rate and regular rhythm.      Heart sounds: No murmur heard.     No friction rub. No gallop.   Pulmonary:      Breath sounds: No wheezing, rhonchi or rales.   Chest:      Chest wall: No tenderness.   Abdominal:      General: There is no distension.      Tenderness: There is no abdominal tenderness. There is no guarding or rebound.   Musculoskeletal:         General: No swelling or tenderness.      Cervical back: Neck supple.      Right lower leg: No edema.      Left lower leg: No edema.   Lymphadenopathy:      Cervical: No cervical adenopathy.         Last Recorded Vitals  Blood pressure 122/82, pulse 79, temperature 37.6 °C (99.7 °F), temperature source Temporal, resp. rate 19, height 1.778 m (5' 10\"), weight 79.8 kg (175 lb 14.8 oz), SpO2 96%.    Intake/Output last 3 Shifts:  I/O last 3 completed shifts:  In: 740 (9.3 mL/kg) [P.O.:740]  Out: 1350 (16.9 mL/kg) [Urine:1350 (0.5 mL/kg/hr)]  Weight: 79.8 kg     Current Facility-Administered Medications:     acetaminophen (Tylenol) tablet 650 mg, 650 mg, oral, q6h PRN, Mary Batres DO, 650 mg at 08/16/24 1519    amLODIPine (Norvasc) tablet 10 mg, 10 mg, oral, Daily, Kayla Boyd MD, 10 mg at 08/21/24 0852    [Held by provider] aspirin EC tablet 81 mg, 81 mg, oral, Daily, Mary Batres DO, 81 mg at 08/11/24 0942    carvedilol (Coreg) tablet 12.5 mg, 12.5 mg, oral, BID, Kayla Boyd MD, 12.5 mg at 08/21/24 0620    dextrose 50 % injection 25 g, 25 g, intravenous, Once, Ayo Peralta, " MD    heparin (porcine) injection 5,000 Units, 5,000 Units, subcutaneous, q8h, Kayla Boyd MD, 5,000 Units at 08/21/24 0620    hydrALAZINE (Apresoline) tablet 50 mg, 50 mg, oral, TID, Kayla Boyd MD, 50 mg at 08/21/24 0851    insulin regular (HumuLIN, NovoLIN) bolus from bag 10 Units, 10 Units, intravenous, Once, Ayo Peralta MD    melatonin tablet 6 mg, 6 mg, oral, Nightly, Kayla Boyd MD, 6 mg at 08/20/24 2037    nicotine (Nicoderm CQ) 14 mg/24 hr patch 1 patch, 1 patch, transdermal, Daily, Mary Batres DO, 1 patch at 08/21/24 0852    ondansetron (Zofran) injection 4 mg, 4 mg, intravenous, q6h PRN, Mary Batres DO    perflutren protein A microsphere (Optison) injection 0.5 mL, 0.5 mL, intravenous, Once in imaging, Mary Batres DO    polyethylene glycol (Glycolax, Miralax) packet 17 g, 17 g, oral, Daily, Kayla Boyd MD, 17 g at 08/20/24 0831    sodium bicarbonate tablet 650 mg, 650 mg, oral, BID, René Peralta MD, 650 mg at 08/21/24 0851    sodium zirconium cyclosilicate (Lokelma) packet 10 g, 10 g, oral, Daily, Ayo Peralta MD    sulfur hexafluoride microsphr (Lumason) injection 24.28 mg, 2 mL, intravenous, Once in imaging, Mary Batres DO    traZODone (Desyrel) tablet 50 mg, 50 mg, oral, Nightly, Kayla Boyd MD, 50 mg at 08/20/24 2036   Relevant Results    Results for orders placed or performed during the hospital encounter of 08/11/24 (from the past 96 hour(s))   CBC   Result Value Ref Range    WBC 8.1 4.4 - 11.3 x10*3/uL    nRBC 0.0 0.0 - 0.0 /100 WBCs    RBC 3.81 (L) 4.50 - 5.90 x10*6/uL    Hemoglobin 12.6 (L) 13.5 - 17.5 g/dL    Hematocrit 37.9 (L) 41.0 - 52.0 %     80 - 100 fL    MCH 33.1 26.0 - 34.0 pg    MCHC 33.2 32.0 - 36.0 g/dL    RDW 15.3 (H) 11.5 - 14.5 %    Platelets 119 (L) 150 - 450 x10*3/uL   Comprehensive Metabolic Panel   Result Value Ref Range    Glucose 82 65 - 99 mg/dL    Sodium 139 133 - 145 mmol/L    Potassium 4.9 3.4 - 5.1 mmol/L     Chloride 104 97 - 107 mmol/L    Bicarbonate 25 24 - 31 mmol/L    Urea Nitrogen 39 (H) 8 - 25 mg/dL    Creatinine 2.70 (H) 0.40 - 1.60 mg/dL    eGFR 24 (L) >60 mL/min/1.73m*2    Calcium 9.1 8.5 - 10.4 mg/dL    Albumin 3.3 (L) 3.5 - 5.0 g/dL    Alkaline Phosphatase 62 35 - 125 U/L    Total Protein 6.6 5.9 - 7.9 g/dL    AST 40 5 - 40 U/L    Bilirubin, Total 0.5 0.1 - 1.2 mg/dL    ALT 81 (H) 5 - 40 U/L    Anion Gap 10 <=19 mmol/L   Magnesium   Result Value Ref Range    Magnesium 2.30 1.60 - 3.10 mg/dL   Comprehensive Metabolic Panel   Result Value Ref Range    Glucose 92 65 - 99 mg/dL    Sodium 138 133 - 145 mmol/L    Potassium 5.1 3.4 - 5.1 mmol/L    Chloride 104 97 - 107 mmol/L    Bicarbonate 23 (L) 24 - 31 mmol/L    Urea Nitrogen 37 (H) 8 - 25 mg/dL    Creatinine 2.80 (H) 0.40 - 1.60 mg/dL    eGFR 23 (L) >60 mL/min/1.73m*2    Calcium 9.3 8.5 - 10.4 mg/dL    Albumin 3.4 (L) 3.5 - 5.0 g/dL    Alkaline Phosphatase 66 35 - 125 U/L    Total Protein 6.6 5.9 - 7.9 g/dL    AST 43 (H) 5 - 40 U/L    Bilirubin, Total 0.5 0.1 - 1.2 mg/dL    ALT 74 (H) 5 - 40 U/L    Anion Gap 11 <=19 mmol/L   CBC and Auto Differential   Result Value Ref Range    WBC 7.6 4.4 - 11.3 x10*3/uL    nRBC 0.0 0.0 - 0.0 /100 WBCs    RBC 3.80 (L) 4.50 - 5.90 x10*6/uL    Hemoglobin 12.5 (L) 13.5 - 17.5 g/dL    Hematocrit 38.5 (L) 41.0 - 52.0 %     (H) 80 - 100 fL    MCH 32.9 26.0 - 34.0 pg    MCHC 32.5 32.0 - 36.0 g/dL    RDW 15.2 (H) 11.5 - 14.5 %    Platelets 141 (L) 150 - 450 x10*3/uL    Neutrophils % 64.8 40.0 - 80.0 %    Immature Granulocytes %, Automated 0.4 0.0 - 0.9 %    Lymphocytes % 18.3 13.0 - 44.0 %    Monocytes % 14.8 2.0 - 10.0 %    Eosinophils % 1.2 0.0 - 6.0 %    Basophils % 0.5 0.0 - 2.0 %    Neutrophils Absolute 4.92 1.60 - 5.50 x10*3/uL    Immature Granulocytes Absolute, Automated 0.03 0.00 - 0.50 x10*3/uL    Lymphocytes Absolute 1.39 0.80 - 3.00 x10*3/uL    Monocytes Absolute 1.12 (H) 0.05 - 0.80 x10*3/uL    Eosinophils Absolute  0.09 0.00 - 0.40 x10*3/uL    Basophils Absolute 0.04 0.00 - 0.10 x10*3/uL   Basic Metabolic Panel   Result Value Ref Range    Glucose 101 (H) 65 - 99 mg/dL    Sodium 135 133 - 145 mmol/L    Potassium 5.3 (H) 3.4 - 5.1 mmol/L    Chloride 101 97 - 107 mmol/L    Bicarbonate 24 24 - 31 mmol/L    Urea Nitrogen 34 (H) 8 - 25 mg/dL    Creatinine 2.80 (H) 0.40 - 1.60 mg/dL    eGFR 23 (L) >60 mL/min/1.73m*2    Calcium 9.2 8.5 - 10.4 mg/dL    Anion Gap 10 <=19 mmol/L   CBC   Result Value Ref Range    WBC 7.9 4.4 - 11.3 x10*3/uL    nRBC 0.0 0.0 - 0.0 /100 WBCs    RBC 3.99 (L) 4.50 - 5.90 x10*6/uL    Hemoglobin 12.9 (L) 13.5 - 17.5 g/dL    Hematocrit 40.5 (L) 41.0 - 52.0 %     (H) 80 - 100 fL    MCH 32.3 26.0 - 34.0 pg    MCHC 31.9 (L) 32.0 - 36.0 g/dL    RDW 14.6 (H) 11.5 - 14.5 %    Platelets 145 (L) 150 - 450 x10*3/uL   Basic Metabolic Panel   Result Value Ref Range    Glucose 91 65 - 99 mg/dL    Sodium 134 133 - 145 mmol/L    Potassium 5.4 (H) 3.4 - 5.1 mmol/L    Chloride 102 97 - 107 mmol/L    Bicarbonate 22 (L) 24 - 31 mmol/L    Urea Nitrogen 35 (H) 8 - 25 mg/dL    Creatinine 3.00 (H) 0.40 - 1.60 mg/dL    eGFR 21 (L) >60 mL/min/1.73m*2    Calcium 9.8 8.5 - 10.4 mg/dL    Anion Gap 10 <=19 mmol/L   CBC   Result Value Ref Range    WBC 7.0 4.4 - 11.3 x10*3/uL    nRBC 0.0 0.0 - 0.0 /100 WBCs    RBC 3.90 (L) 4.50 - 5.90 x10*6/uL    Hemoglobin 12.9 (L) 13.5 - 17.5 g/dL    Hematocrit 38.7 (L) 41.0 - 52.0 %    MCV 99 80 - 100 fL    MCH 33.1 26.0 - 34.0 pg    MCHC 33.3 32.0 - 36.0 g/dL    RDW 14.6 (H) 11.5 - 14.5 %    Platelets 152 150 - 450 x10*3/uL       Assessment/Plan     Acute kidney injury renal biopsy tissue was not sent it will be sent to the renal pathology lab today not ready for discharge today  Hyperkalemia I will treat with dextrose and insulin and start him on Lokelma and switch his diet to renal diet  Hypertensive urgency blood pressure is under control now  Congestive heart failure compensated  Hepatitis  C  BPH  Metabolic acidosis  Elevated liver enzymes  Encephalopathy improving           Ayo Peralta MD

## 2024-08-21 NOTE — PROGRESS NOTES
Estuardo El is a 74 y.o. male on day 10 of admission presenting with Acute renal failure, unspecified acute renal failure type (CMS-HCC).      Subjective   Patient says he feels fine today.  He has no pain or shortness of breath.  He is having some tea-colored urine with small clots being passed.  He says this is not causing him any pain or concerns right now.       Objective     Last Recorded Vitals  /71 (BP Location: Left arm, Patient Position: Sitting)   Pulse 79   Temp 37.6 °C (99.7 °F) (Temporal)   Resp 19   Wt 79.8 kg (175 lb 14.8 oz)   SpO2 94%   Intake/Output last 3 Shifts:    Intake/Output Summary (Last 24 hours) at 8/21/2024 1324  Last data filed at 8/21/2024 0900  Gross per 24 hour   Intake 860 ml   Output 1200 ml   Net -340 ml       Admission Weight  Weight: 92.5 kg (203 lb 14.8 oz) (08/12/24 0047)    Daily Weight  08/21/24 : 79.8 kg (175 lb 14.8 oz)    Image Results  US guided percutaneous biopsy renal right  Narrative: Interpreted By:  Nata Richards,   STUDY:  US GUIDED PERCUTANEOUS BIOPSY RENAL RIGHT; 8/19/2024 3:11 pm      INDICATION:  Signs/Symptoms:Percutaneous cortical renal biopsy.      COMPARISON:  None.      ACCESSION NUMBER(S):  [id number]      ORDERING CLINICIAN:  MARLENA ALTMAN      TECHNIQUE:  PROCEDURE PERFORMED:  ULTRASOUND GUIDED  random right kidney BIOPSY ON  August 19, 2024.      PRE-PROCEDURE DIAGNOSIS:  Renal failure      POST-PROCEDURE DIAGNOSIS:  Pending  pathology      INDICATION FOR PROCEDURE:  The patient is a 74 year old  male who presents with renal failure.      STAFF RADIOLOGIST:  Nata Richards MD      ASSISTANT(S):  None      CONSENT:  The risks, benefits, treatment options, potential complications and  personnel involved were discussed with the  patient.  All questions  were answered and consent was obtained. The  patient indicated  he  was willing to proceed.  The staff physician personally verified  consent.      TIME OUT:  A time out was  performed immediately prior to procedure start with  the nursing, anesthesia and interventional team, correctly  identifying the patient name, date of birth, procedure, anatomy  (including marking of site and side), patient position, procedure  consent form, relevant diagnostic and radiology test results,  antibiotic administration (when indicated), safety precautions, and  procedure-specific equipment needs.      PROCEDURE:  After performing the time out, the right kidney was localized with  ultrasound, images were obtained and archived.  Skin entry site was  prepped and draped in the usual sterile fashion.  Under direct  ultrasound guidance,  3 passes  were made into the right renal cortex  using an  18 gauge cutting needle.      The procedure was performed by the: Attending radiologist without an  assistant      The attending radiologist performed the following procedural  activities: Entire procedure      ANESTHESIA:  Local anesthesia was achieved utilizing 10 cc 1% percent lidocaine.  Vital signs were monitored by the nurse.      START TIME/TIMEOUT TIME:  2:45 p.m.      END TIME:  3:15 p.m.      SEDATION TIME:  30 minutes.      COMPLICATIONS:  None      SIGN-OUT DISCUSSION:  Completed.      ESTIMATED BLOOD LOSS:  None.      CONTRAST:  None.      SPECIMENS:  3 core biopsy specimens were obtained, sent fresh to pathology and  reviewed and determined to be adequate.      BIOPSY DEVICE:  18 gauge core biopsy needle.      FINDINGS:  As described above      Impression: ULTRASOUND GUIDED RIGHT RENAL CORTICAL BIOPSY AS DESCRIBED.          Signed by: Nata Richards 8/19/2024 3:31 PM  Dictation workstation:   [id number]      Physical Exam  General: alert, no diaphoresis   Lungs: CTA BL   Heart: RRR, no LE edema BL   GI: abdomen soft, nontender, nondistended, BS present   MSK: no joint effusion or deformity   Skin: no rashes, erythema, or ecchymosis   Neuro: grossly normal cognition, motor strength, sensation      Relevant Results               Assessment/Plan                  Assessment & Plan  Acute renal failure, unspecified acute renal failure type (CMS-HCC)  Nephrology following. Creatinine has stabilized, unclear if this will be his new baseline creatinine  Ethylene glycol and volatile compound panel negative  Given significantly elevated hep C RNA, concern for possible cryoglobulinemia, follow up cryoglobulins  Biopsy completed 8/19. Expect prelim results later today. Discussed with Dr. Peralta.  Unfortunately biopsy was not sent to the pathology department yesterday.  It has been sent today.  We are still awaiting preliminary results  Hold aspirin  Metabolic acidosis  Resolved  Continue PO bicarb  Acute metabolic encephalopathy  Suspect due to delirium from acute illness and sleep disturbance at night  Also likely has a component of cognitive impairment at baseline - per son, patient is baseline A&Ox3 but is forgetful of smaller details like days of the week and exact city/location  Neurology following  Ammonia wnl, pCO2 wnl  CT head negative  MRI brain negative  EEG wnl  Continue trazodone nightly for sleep  Delirium precautions  Again stable tonight, patient alert. Hopefully resolved.  Hypertensive urgency  Continue amlodipine, hydralazine, and coreg  Avoiding ACEi/ARBs for now due to renal failure  Workup suggestive of primary hyperaldosteronism  CT adrenals unremarkable  Urgency resolved. 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 Mcgraw  for Hep C treatment and liver surveillance   Elevated liver enzymes  Evaluated by GI - predominately hepatocellular  Has hx of EtOH use and Hep C  Improving   Tobacco use  Nicotine patch  Recommend cessation  Hyperkalemia  Mild hyperkalemia today.  Dr. Peralta is treating this.  Repeating labs in the morning  Gross hematuria  Patient having some hematuria post renal biopsy.  This is not unexpected.  Hopefully this will continue to clear up.  Patient is having no difficulties voiding and no dysuria.  We checked a bladder scan this morning just to make sure and he had minimal residual.      Dispo: stable for discharge to SNF starting tomorrow. Discussed with CM. She will start precert.              Mary Batres DO

## 2024-08-21 NOTE — ASSESSMENT & PLAN NOTE
Patient having some hematuria post renal biopsy.  This is not unexpected.  Hopefully this will continue to clear up.  Patient is having no difficulties voiding and no dysuria.  We checked a bladder scan this morning just to make sure and he had minimal residual.

## 2024-08-21 NOTE — PROGRESS NOTES
Occupational Therapy                 Therapy Communication Note    Patient Name: Estuardo El  MRN: [id number]  Today's Date: 8/21/2024     Discipline: Occupational Therapy    Missed Visit Reason: Patient sleeping (Attempted OT treatment, but pt sound asleep and increasingly difficult to arouse in order to participate.)    Missed Time: Attempt

## 2024-08-21 NOTE — CARE PLAN
The patient's goals for the shift include      The clinical goals for the shift include safety over night    Over the shift, the patient did not make progress toward the following goals. Barriers to progression include generalized weakness. Recommendations to address these barriers include bed alarm activated, call light within reach, frequent checks.

## 2024-08-21 NOTE — ASSESSMENT & PLAN NOTE
Nephrology following. Creatinine has stabilized, unclear if this will be his new baseline creatinine  Ethylene glycol and volatile compound panel negative  Given significantly elevated hep C RNA, concern for possible cryoglobulinemia, follow up cryoglobulins  Biopsy completed 8/19. Expect prelim results later today. Discussed with Dr. Peralta.  Unfortunately biopsy was not sent to the pathology department yesterday.  It has been sent today.  We are still awaiting preliminary results  Hold aspirin

## 2024-08-21 NOTE — PROGRESS NOTES
Physical Therapy    Physical Therapy Treatment    Patient Name: Estuardo El  MRN: [id number]  Today's Date: 8/21/2024  Time Calculation  Start Time: 1117  Stop Time: 1140  Time Calculation (min): 23 min    Assessment/Plan   PT Assessment  End of Session Communication: Bedside nurse  Assessment Comment: pt demonstrated improved ambulation tolerance today using FWw; Continue to require mod encouragement for out of bed activities.  End of Session Patient Position: Up in chair, Alarm on (call button in reach)  PT Plan  Inpatient/Swing Bed or Outpatient: Inpatient  PT Plan  Treatment/Interventions: Bed mobility, Transfer training, Gait training, Balance training, Strengthening, Endurance training, Therapeutic exercise, Therapeutic activity  PT Plan: Ongoing PT  PT Frequency: 4 times per week  PT Discharge Recommendations: Moderate intensity level of continued care  Equipment Recommended upon Discharge: Wheeled walker  PT Recommended Transfer Status: Assist x1, Assistive device (FWw)  PT - OK to Discharge: Yes      General Visit Information:   PT  Visit  PT Received On: 08/21/24  General  Family/Caregiver Present: No  Prior to Session Communication: Bedside nurse  Patient Position Received: Bed, 3 rail up, Alarm off, not on at start of session  Preferred Learning Style: verbal, visual  General Comment: cleared by nurse for therapy; pt agreeable to therapy; + telemetry    Subjective   Precautions:  Precautions  Hearing/Visual Limitations: WFL  Medical Precautions: Fall precautions  Vital Signs:  Vital Signs  Heart Rate: 79  SpO2: 94 %  BP: 131/71  MAP (mmHg): 83  BP Location: Left arm  BP Method: Automatic  Patient Position: Sitting    Objective   Pain:  Pain Assessment  Pain Assessment: 0-10  0-10 (Numeric) Pain Score: 0 - No pain  Cognition:  Cognition  Overall Cognitive Status: Within Functional Limits  Orientation Level: Oriented X4  Following Commands: Follows one step commands without difficulty  Cognition Comments:  flat affect  Safety/Judgement:  (decreased safety insight during functional mobility)  Insight: Mild  Processing Speed: Delayed  Coordination:  Movements are Fluid and Coordinated: No  Heel to Shin:  (slow rate of movement; + weakness)  Postural Control:  Postural Control  Posture Comment: mild forward head, protracted shldrs, mild forward flexed  Extremity/Trunk Assessments:    Activity Tolerance:  Activity Tolerance  Endurance: Decreased tolerance for upright activites  Activity Tolerance Comments: fair +  Treatments:  Therapeutic Exercise  Therapeutic Exercise Performed: Yes  Therapeutic Exercise Activity 1: supine tom AP x 25 reps  Therapeutic Exercise Activity 2: seated tom LAQ's x 30 reps  Therapeutic Exercise Activity 3: seated tom hip abd/add x 30 reps  Therapeutic Exercise Activity 4: seated tom marching x 30 reps  Therapeutic Exercise Activity 5: seanding umilateral UE elevation x 10 reps, stabilized FWW    Therapeutic Activity  Therapeutic Activity Performed: Yes (see bed mobility, transfersd, amb with FWW comments)    Bed Mobility  Bed Mobility: Yes  Bed Mobility 1  Bed Mobility 1: Sitting to supine  Level of Assistance 1: Close supervision, Minimal verbal cues  Bed Mobility Comments 1: head of bed elevated; verbal cues for increased active us eof tom UE's    Ambulation/Gait Training  Ambulation/Gait Training Performed: Yes  Ambulation/Gait Training 1  Surface 1: Level tile  Device 1: Rolling walker  Assistance 1: Minimum assistance, Minimal verbal cues  Quality of Gait 1: Decreased step length, Diminished heel strike, Forward flexed posture  Comments/Distance (ft) 1: pt amb 20 ft x 2, FWW, + turns, min assist of 1, verbal cues for erect posture, staying close to FWw at all times; Overall balance fair +. O2 sat 96% with HR 79 bpm  Transfers  Transfer: Yes  Transfer 1  Transfer From 1: Sit to  Transfer to 1: Stand  Technique 1: Sit to stand  Transfer Device 1: Walker  Transfer Level of Assistance 1:  Minimum assistance, Minimal verbal cues  Trials/Comments 1: min assist of 1 for trunk up, verbal cues for proper tom hand placement, forward weight shifting  Transfers 2  Transfer From 2: Stand to  Transfer to 2: Sit  Technique 2: Stand to sit  Transfer Device 2: Walker  Transfer Level of Assistance 2: Minimum assistance, Minimal verbal cues  Trials/Comments 2: min assist of 1 for trunk down, verbal cues for reaching back with both hands for arms of chair    Stairs  Stairs: No    Outcome Measures:  [facility] Basic Mobility  Turning from your back to your side while in a flat bed without using bedrails: A little  Moving from lying on your back to sitting on the side of a flat bed without using bedrails: A little  Moving to and from bed to chair (including a wheelchair): A little  Standing up from a chair using your arms (e.g. wheelchair or bedside chair): A little  To walk in hospital room: A little  Climbing 3-5 steps with railing: Total  Basic Mobility - Total Score: 16    Education Documentation  Mobility Training, taught by Yoana Suazo PT at 8/21/2024  1:12 PM.  Learner: Patient  Readiness: Acceptance  Method: Explanation, Demonstration  Response: Needs Reinforcement    Education Comments  No comments found.               Encounter Problems       Encounter Problems (Active)       Mobility       Bed mobility including supine to sit and sit to supine with min assist. (Progressing)       Start:  08/12/24    Expected End:  08/26/24            Transfers including sit to stand and stand to sit with min assist. (Met)       Start:  08/12/24    Expected End:  08/26/24    Resolved:  08/16/24    Updated to: Transfers including sit<>stand with LRAD and mod I.    Update reason: new goal required         Ambulate 50 feet with rolling walker and min assist. (Progressing)       Start:  08/12/24    Expected End:  08/26/24            Transfers including sit<>stand with LRAD and mod I. (Progressing)       Start:  08/16/24     Expected End:  08/26/24

## 2024-08-22 ENCOUNTER — TELEPHONE (OUTPATIENT)
Dept: PRIMARY CARE | Facility: CLINIC | Age: 74
End: 2024-08-22
Payer: MEDICARE

## 2024-08-22 VITALS
TEMPERATURE: 98.4 F | HEIGHT: 70 IN | HEART RATE: 76 BPM | OXYGEN SATURATION: 95 % | DIASTOLIC BLOOD PRESSURE: 68 MMHG | BODY MASS INDEX: 30.52 KG/M2 | SYSTOLIC BLOOD PRESSURE: 113 MMHG | RESPIRATION RATE: 18 BRPM | WEIGHT: 213.19 LBS

## 2024-08-22 LAB
ANION GAP SERPL CALC-SCNC: 11 MMOL/L
BUN SERPL-MCNC: 40 MG/DL (ref 8–25)
CALCIUM SERPL-MCNC: 8.9 MG/DL (ref 8.5–10.4)
CHLORIDE SERPL-SCNC: 102 MMOL/L (ref 97–107)
CO2 SERPL-SCNC: 24 MMOL/L (ref 24–31)
CREAT SERPL-MCNC: 3.5 MG/DL (ref 0.4–1.6)
EGFRCR SERPLBLD CKD-EPI 2021: 18 ML/MIN/1.73M*2
ERYTHROCYTE [DISTWIDTH] IN BLOOD BY AUTOMATED COUNT: 14.2 % (ref 11.5–14.5)
GLUCOSE SERPL-MCNC: 79 MG/DL (ref 65–99)
HCT VFR BLD AUTO: 36.6 % (ref 41–52)
HGB BLD-MCNC: 11.9 G/DL (ref 13.5–17.5)
MCH RBC QN AUTO: 33 PG (ref 26–34)
MCHC RBC AUTO-ENTMCNC: 32.5 G/DL (ref 32–36)
MCV RBC AUTO: 101 FL (ref 80–100)
NRBC BLD-RTO: 0 /100 WBCS (ref 0–0)
PLATELET # BLD AUTO: 145 X10*3/UL (ref 150–450)
POTASSIUM SERPL-SCNC: 4.9 MMOL/L (ref 3.4–5.1)
RBC # BLD AUTO: 3.61 X10*6/UL (ref 4.5–5.9)
SODIUM SERPL-SCNC: 137 MMOL/L (ref 133–145)
WBC # BLD AUTO: 6.7 X10*3/UL (ref 4.4–11.3)

## 2024-08-22 PROCEDURE — 85027 COMPLETE CBC AUTOMATED: CPT | Performed by: HOSPITALIST

## 2024-08-22 PROCEDURE — 97535 SELF CARE MNGMENT TRAINING: CPT | Mod: GO,CO

## 2024-08-22 PROCEDURE — 2500000002 HC RX 250 W HCPCS SELF ADMINISTERED DRUGS (ALT 637 FOR MEDICARE OP, ALT 636 FOR OP/ED): Performed by: HOSPITALIST

## 2024-08-22 PROCEDURE — 2500000001 HC RX 250 WO HCPCS SELF ADMINISTERED DRUGS (ALT 637 FOR MEDICARE OP): Performed by: STUDENT IN AN ORGANIZED HEALTH CARE EDUCATION/TRAINING PROGRAM

## 2024-08-22 PROCEDURE — 80048 BASIC METABOLIC PNL TOTAL CA: CPT | Performed by: HOSPITALIST

## 2024-08-22 PROCEDURE — 2500000004 HC RX 250 GENERAL PHARMACY W/ HCPCS (ALT 636 FOR OP/ED): Performed by: STUDENT IN AN ORGANIZED HEALTH CARE EDUCATION/TRAINING PROGRAM

## 2024-08-22 PROCEDURE — 2500000002 HC RX 250 W HCPCS SELF ADMINISTERED DRUGS (ALT 637 FOR MEDICARE OP, ALT 636 FOR OP/ED): Performed by: INTERNAL MEDICINE

## 2024-08-22 PROCEDURE — 2500000004 HC RX 250 GENERAL PHARMACY W/ HCPCS (ALT 636 FOR OP/ED): Performed by: INTERNAL MEDICINE

## 2024-08-22 PROCEDURE — S4991 NICOTINE PATCH NONLEGEND: HCPCS | Performed by: HOSPITALIST

## 2024-08-22 PROCEDURE — 36415 COLL VENOUS BLD VENIPUNCTURE: CPT | Performed by: HOSPITALIST

## 2024-08-22 RX ORDER — ASPIRIN 81 MG/1
81 TABLET ORAL DAILY
Start: 2024-08-23

## 2024-08-22 RX ORDER — TRAZODONE HYDROCHLORIDE 50 MG/1
50 TABLET ORAL NIGHTLY
Start: 2024-08-22

## 2024-08-22 RX ORDER — CARVEDILOL 12.5 MG/1
12.5 TABLET ORAL
Start: 2024-08-22

## 2024-08-22 RX ORDER — AMLODIPINE BESYLATE 10 MG/1
10 TABLET ORAL DAILY
Start: 2024-08-23

## 2024-08-22 RX ORDER — IBUPROFEN 200 MG
1 TABLET ORAL DAILY
Start: 2024-08-23

## 2024-08-22 RX ORDER — HYDRALAZINE HYDROCHLORIDE 50 MG/1
50 TABLET, FILM COATED ORAL 3 TIMES DAILY
Start: 2024-08-22

## 2024-08-22 RX ORDER — SODIUM BICARBONATE 650 MG/1
650 TABLET ORAL 2 TIMES DAILY
Start: 2024-08-22

## 2024-08-22 RX ORDER — TALC
6 POWDER (GRAM) TOPICAL NIGHTLY
Start: 2024-08-22

## 2024-08-22 RX ADMIN — AMLODIPINE BESYLATE 10 MG: 10 TABLET ORAL at 08:43

## 2024-08-22 RX ADMIN — SODIUM ZIRCONIUM CYCLOSILICATE 10 G: 10 POWDER, FOR SUSPENSION ORAL at 08:42

## 2024-08-22 RX ADMIN — HYDRALAZINE HYDROCHLORIDE 50 MG: 50 TABLET ORAL at 14:41

## 2024-08-22 RX ADMIN — SODIUM BICARBONATE 650 MG: 650 TABLET ORAL at 08:42

## 2024-08-22 RX ADMIN — Medication 1 PATCH: at 08:42

## 2024-08-22 RX ADMIN — HEPARIN SODIUM 5000 UNITS: 5000 INJECTION, SOLUTION INTRAVENOUS; SUBCUTANEOUS at 08:43

## 2024-08-22 RX ADMIN — CARVEDILOL 12.5 MG: 12.5 TABLET, FILM COATED ORAL at 06:24

## 2024-08-22 RX ADMIN — HYDRALAZINE HYDROCHLORIDE 50 MG: 50 TABLET ORAL at 08:43

## 2024-08-22 ASSESSMENT — COGNITIVE AND FUNCTIONAL STATUS - GENERAL
DRESSING REGULAR UPPER BODY CLOTHING: A LITTLE
DAILY ACTIVITIY SCORE: 16
HELP NEEDED FOR BATHING: A LITTLE
TOILETING: A LOT
DRESSING REGULAR LOWER BODY CLOTHING: A LOT
PERSONAL GROOMING: A LITTLE
EATING MEALS: A LITTLE

## 2024-08-22 ASSESSMENT — PAIN SCALES - GENERAL
PAINLEVEL_OUTOF10: 0 - NO PAIN

## 2024-08-22 ASSESSMENT — PAIN - FUNCTIONAL ASSESSMENT
PAIN_FUNCTIONAL_ASSESSMENT: 0-10
PAIN_FUNCTIONAL_ASSESSMENT: 0-10
PAIN_FUNCTIONAL_ASSESSMENT: FLACC (FACE, LEGS, ACTIVITY, CRY, CONSOLABILITY)
PAIN_FUNCTIONAL_ASSESSMENT: 0-10

## 2024-08-22 ASSESSMENT — ACTIVITIES OF DAILY LIVING (ADL): HOME_MANAGEMENT_TIME_ENTRY: 10

## 2024-08-22 NOTE — PROGRESS NOTES
Occupational Therapy    OT Treatment    Patient Name: Estuardo El  MRN: [id number]  Today's Date: 8/22/2024  Time Calculation  Start Time: 0915  Stop Time: 0925  Time Calculation (min): 10 min        Assessment:  OT Assessment: Tolerated session fairly, demonstrating minor progression towards POC with increased self-limiting behaviors noted this date. Pt encouraged + educated on importance of everyday activity, however declining OOB activity this date. Pt would benefit from continued skilled OT services to improve strength, balance, and functional tolerance to increase independence with ADL tasks  End of Session Communication: Bedside nurse  End of Session Patient Position: Bed, 3 rail up, Alarm off, not on at start of session  OT Assessment Results: Decreased ADL status, Decreased endurance, Decreased functional mobility, Decreased IADLs  Plan:  Treatment Interventions: ADL retraining, Functional transfer training, UE strengthening/ROM, Endurance training, Cognitive reorientation, Patient/family training, Equipment evaluation/education, Compensatory technique education  OT Frequency: 3 times per week  OT Discharge Recommendations: Moderate intensity level of continued care  Equipment Recommended upon Discharge: Wheeled walker  OT Recommended Transfer Status: Moderate assist, Assist of 2  OT - OK to Discharge: Yes  Treatment Interventions: ADL retraining, Functional transfer training, UE strengthening/ROM, Endurance training, Cognitive reorientation, Patient/family training, Equipment evaluation/education, Compensatory technique education    Subjective   Previous Visit Info:  OT Last Visit  OT Received On: 08/22/24  General:  General  Prior to Session Communication: Bedside nurse  Patient Position Received: Bed, 3 rail up, Alarm off, not on at start of session  General Comment: Cleared for therapy per RN. Pt supine in bed upon arrival and agreeable to get cleaned up at EOB, limited  participation  Precautions:  Medical Precautions: Fall precautions    Pain:  Pain Assessment  Pain Assessment: 0-10  0-10 (Numeric) Pain Score: 0 - No pain    Objective    Cognition:  Cognition  Overall Cognitive Status: Within Functional Limits  Cognition Comments: flat affect, self-limiting    Activities of Daily Living:    Grooming  Grooming Level of Assistance: Setup, Close supervision  Grooming Where Assessed: Edge of bed  Grooming Comments: face washing task, declining engagement in oral hygiene + hair brushing    UE Bathing  UE Bathing Comments: declining engagement    UE Dressing  UE Dressing Level of Assistance: Minimum assistance  UE Dressing Where Assessed: Edge of bed  UE Dressing Comments: don/doff gown    Bed Mobility/Transfers: Bed Mobility  Bed Mobility: Yes  Bed Mobility 1  Bed Mobility 1: Supine to sitting, Sitting to supine  Level of Assistance 1: Close supervision, Minimal verbal cues  Bed Mobility Comments 1: HOB elevated with cues to scoot hips to EOB. Tolerated sitting EOB for limited time, declining further participation in tasks and wanting to return to supine.    Transfers  Transfer: No (declining OOB activity at this time despite max encouragement. Advised pt to sit in chair for all meals this date, expressing understanding)    Sitting Balance:  Dynamic Sitting Balance  Dynamic Sitting-Level of Assistance: Close supervision  Dynamic Sitting-Balance: Forward lean, Reaching for objects, Reaching across midline  Dynamic Sitting-Comments: fair balance during ADL tasks at EOB    Outcome Measures:[facility] Daily Activity  Putting on and taking off regular lower body clothing: A lot  Bathing (including washing, rinsing, drying): A little  Putting on and taking off regular upper body clothing: A little  Toileting, which includes using toilet, bedpan or urinal: A lot  Taking care of personal grooming such as brushing teeth: A little  Eating Meals: A little  Daily Activity - Total Score: 16    Education  Documentation  Precautions, taught by KRISTINA Viera at 8/22/2024 10:23 AM.  Learner: Patient  Readiness: Acceptance  Method: Explanation  Response: Verbalizes Understanding, Needs Reinforcement    ADL Training, taught by KRISTINA Viera at 8/22/2024 10:23 AM.  Learner: Patient  Readiness: Acceptance  Method: Explanation  Response: Verbalizes Understanding, Needs Reinforcement    Education Comments  No comments found.      Problem: OT Goals  Goal: ADLs  Description: Pt will complete ADL tasks with Mod I, using AE as needed, in order to complete self-care tasks.    Outcome: Progressing     Problem: OT Goals  Goal: Functional transfers  Outcome: Not Progressing  Goal: Functional mobility  Description: Pt will perform functional mobility household distance at mod ind level with RW    Outcome: Not Progressing

## 2024-08-22 NOTE — PROGRESS NOTES
08/22/24 1454   Discharge Planning   Expected Discharge Disposition SNF   Has discharge transport been arranged? Yes   What day is the transport expected? 08/22/24   What time is the transport expected? 1600     7000 Completed   AVS and goldenrod sent to facility   Son and patient updated    RN made aware and given report number

## 2024-08-22 NOTE — PROGRESS NOTES
"Physical Therapy                 Therapy Communication Note    Patient Name: Estuardo El  MRN: [id number]  Today's Date: 8/22/2024     Discipline: Physical Therapy    Missed Visit Reason: Missed Visit Reason: Patient refused (Pt reports that \"he doesn't feel like it\". Continues to decline despite edu/encouragement.)    Missed Time: Attempt 1400    Comment:  "

## 2024-08-22 NOTE — PROGRESS NOTES
Estuardo El is a 74 y.o. male on day 11 of admission presenting with Acute renal failure, unspecified acute renal failure type (CMS-HCC).      Subjective   Patient fairly flat affect today. No complaints. Worked with PT.       Objective     Last Recorded Vitals  /78 (BP Location: Left arm, Patient Position: Lying)   Pulse 72   Temp 36.1 °C (97 °F) (Temporal)   Resp 11   Wt 96.7 kg (213 lb 3 oz)   SpO2 96%   Intake/Output last 3 Shifts:    Intake/Output Summary (Last 24 hours) at 8/22/2024 1127  Last data filed at 8/22/2024 0700  Gross per 24 hour   Intake --   Output 600 ml   Net -600 ml       Admission Weight  Weight: 92.5 kg (203 lb 14.8 oz) (08/12/24 0047)    Daily Weight  08/22/24 : 96.7 kg (213 lb 3 oz)    Image Results  US guided percutaneous biopsy renal right  Narrative: Interpreted By:  Nata Richards,   STUDY:  US GUIDED PERCUTANEOUS BIOPSY RENAL RIGHT; 8/19/2024 3:11 pm      INDICATION:  Signs/Symptoms:Percutaneous cortical renal biopsy.      COMPARISON:  None.      ACCESSION NUMBER(S):  [id number]      ORDERING CLINICIAN:  MARLENA ALTMAN      TECHNIQUE:  PROCEDURE PERFORMED:  ULTRASOUND GUIDED  random right kidney BIOPSY ON  August 19, 2024.      PRE-PROCEDURE DIAGNOSIS:  Renal failure      POST-PROCEDURE DIAGNOSIS:  Pending  pathology      INDICATION FOR PROCEDURE:  The patient is a 74 year old  male who presents with renal failure.      STAFF RADIOLOGIST:  Nata Richards MD      ASSISTANT(S):  None      CONSENT:  The risks, benefits, treatment options, potential complications and  personnel involved were discussed with the  patient.  All questions  were answered and consent was obtained. The  patient indicated  he  was willing to proceed.  The staff physician personally verified  consent.      TIME OUT:  A time out was performed immediately prior to procedure start with  the nursing, anesthesia and interventional team, correctly  identifying the patient name, date of birth,  procedure, anatomy  (including marking of site and side), patient position, procedure  consent form, relevant diagnostic and radiology test results,  antibiotic administration (when indicated), safety precautions, and  procedure-specific equipment needs.      PROCEDURE:  After performing the time out, the right kidney was localized with  ultrasound, images were obtained and archived.  Skin entry site was  prepped and draped in the usual sterile fashion.  Under direct  ultrasound guidance,  3 passes  were made into the right renal cortex  using an  18 gauge cutting needle.      The procedure was performed by the: Attending radiologist without an  assistant      The attending radiologist performed the following procedural  activities: Entire procedure      ANESTHESIA:  Local anesthesia was achieved utilizing 10 cc 1% percent lidocaine.  Vital signs were monitored by the nurse.      START TIME/TIMEOUT TIME:  2:45 p.m.      END TIME:  3:15 p.m.      SEDATION TIME:  30 minutes.      COMPLICATIONS:  None      SIGN-OUT DISCUSSION:  Completed.      ESTIMATED BLOOD LOSS:  None.      CONTRAST:  None.      SPECIMENS:  3 core biopsy specimens were obtained, sent fresh to pathology and  reviewed and determined to be adequate.      BIOPSY DEVICE:  18 gauge core biopsy needle.      FINDINGS:  As described above      Impression: ULTRASOUND GUIDED RIGHT RENAL CORTICAL BIOPSY AS DESCRIBED.          Signed by: Nata Richards 8/19/2024 3:31 PM  Dictation workstation:   [id number]      Physical Exam  General: alert, no diaphoresis   Lungs: CTA BL   Heart: RRR, no LE edema BL   GI: abdomen soft, nontender, nondistended, BS present   MSK: no joint effusion or deformity   Skin: no rashes, erythema, or ecchymosis   Neuro: grossly normal cognition, motor strength, sensation     Relevant Results               Assessment/Plan                  Assessment & Plan  Acute renal failure, unspecified acute renal failure type  (CMS-HCC)  Nephrology following. Creatinine has been increasing past few days.  Will need close monitoring as outpatient. Cryoglobulins positive. 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 Will send quantitative cryo level. Unfortunately there are not good treatments for cryoglobulinemia, and patient ultimately may require dialysis   Biopsy completed 8/19. Expect prelim results later today. Discussed with Dr. Peralta.  Unfortunately biopsy was not sent to the pathology department yesterday.  It has been sent today.  We are still awaiting preliminary results    Metabolic acidosis  Resolved  Continue PO bicarb  Acute metabolic encephalopathy  Suspect due to delirium from acute illness and sleep disturbance at night  Also likely has a component of cognitive impairment at baseline - per son, patient is baseline A&Ox3 but is forgetful of smaller details like days of the week and exact city/location  Neurology following  Ammonia wnl, pCO2 wnl  CT head negative  MRI brain negative  EEG wnl  Continue trazodone nightly for sleep  Delirium precautions  Again stable tonight, patient alert. Hopefully resolved.  Hypertensive urgency  Continue amlodipine, hydralazine, and coreg  Avoiding ACEi/ARBs for now due to renal failure  Workup suggestive of primary hyperaldosteronism  CT adrenals unremarkable  Urgency resolved. 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 Mcgraw for Hep C treatment and liver surveillance   Elevated liver enzymes  Evaluated by GI -  predominately hepatocellular  Has hx of EtOH use and Hep C  Improving   Tobacco use  Nicotine patch  Recommend cessation  Hyperkalemia  Hyperkalemia resolved  Gross hematuria  Patient having some hematuria post renal biopsy.  This is not unexpected.  Hopefully this will continue to clear up.  Patient is having no difficulties voiding and no dysuria.  We checked a bladder scan this morning just to make sure and he had minimal residual.      Dispo: I discussed this with Dr. Peralta.  Plan is to discharge to skilled nursing facility.  He anticipates hearing back from the pathology lab later today about preliminary results.  If preliminary results are back he can leave this evening to go to skilled nursing facility.  If not, will need to restart the pre-CERT tomorrow and plan on discharging after that.  When he discharges he will need to have daily renal function panels for at least 3 days at the facility.              Mary Batres, DO

## 2024-08-22 NOTE — ASSESSMENT & PLAN NOTE
Nephrology following. Creatinine has been increasing past few days.  Will need close monitoring as outpatient. Cryoglobulins positive. 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 Will send quantitative cryo level. Unfortunately there are not good treatments for cryoglobulinemia, and patient ultimately may require dialysis   Biopsy completed 8/19. Expect prelim results later today. Discussed with Dr. Peralta.  Unfortunately biopsy was not sent to the pathology department yesterday.  It has been sent today.  We are still awaiting preliminary results

## 2024-08-22 NOTE — DISCHARGE SUMMARY
Discharge Diagnosis  Acute renal failure, unspecified acute renal failure type (CMS-HCC)    Issues Requiring Follow-Up  Needs daily BMP x3 days to monitor renal function and follow up with Dr. René Peralta in 1 wk  Follow up with Dr. Campbell- cardio  Follow up with Dr. Lundberg- urology  Follow up with GI, Dr. Mcgraw, for hepatitis C    Discharge Meds     Your medication list        START taking these medications        Instructions Last Dose Given Next Dose Due   amLODIPine 10 mg tablet  Commonly known as: Norvasc  Start taking on: August 23, 2024      Take 1 tablet (10 mg) by mouth once daily.       aspirin 81 mg EC tablet  Start taking on: August 23, 2024      Take 1 tablet (81 mg) by mouth once daily.       carvedilol 12.5 mg tablet  Commonly known as: Coreg      Take 1 tablet (12.5 mg) by mouth 2 times daily (morning and late afternoon).       hydrALAZINE 50 mg tablet  Commonly known as: Apresoline      Take 1 tablet (50 mg) by mouth 3 times a day.       melatonin 3 mg tablet      Take 2 tablets (6 mg) by mouth once daily at bedtime.       nicotine 14 mg/24 hr patch  Commonly known as: Nicoderm CQ  Start taking on: August 23, 2024      Place 1 patch over 24 hours on the skin once daily.       sodium bicarbonate 650 mg tablet      Take 1 tablet (650 mg) by mouth 2 times a day.       sodium zirconium cyclosilicate 10 gram packet  Commonly known as: Lokelma  Start taking on: August 23, 2024      Take 10 g by mouth once daily.       traZODone 50 mg tablet  Commonly known as: Desyrel      Take 1 tablet (50 mg) by mouth once daily at bedtime.                 Where to Get Your Medications        Information about where to get these medications is not yet available    Ask your nurse or doctor about these medications  amLODIPine 10 mg tablet  aspirin 81 mg EC tablet  carvedilol 12.5 mg tablet  hydrALAZINE 50 mg tablet  melatonin 3 mg tablet  nicotine 14 mg/24 hr patch  sodium bicarbonate 650 mg tablet  sodium zirconium  cyclosilicate 10 gram packet  traZODone 50 mg tablet         Test Results Pending At Discharge  Pending Labs       Order Current Status    Surgical Pathology Exam In process            Hospital Course  74yoM hx of HTN, BPH, hepatitis C, diastolic heart failure who presented with worsening shortness of breath and generalized weakness. In the ED, initial vitals showing significantly elevated blood pressures with SBP in the 200s. Labs significant for acute renal failure (initial creatinine 4.10), elevated troponin, and elevated liver enzymes. Cardiology, nephrology, and GI were consulted. Patient was started on antihypertensives for gradual reduction in blood pressure by cardiology. 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 CT adrenal glands negative.   Echo showing reduced EF of 40-45% with diastolic dysfunction. This is a new diagnosis of systolic heart failure in addition to known diastolic heart failure. Unable to fully initiate GDMT/start ACEi/ARB or diuretic due to renal failure. Patient was noted to have significantly elevated Hep C viral load of 592,000 which is likely the cause of elevated liver enzymes. Patient also reported hx of prior EtOH use which is probably also contributing. Liver US showing hepatic steatosis/steatohepatitis. Unclear etiology of renal failure. Given high Hep C viral load there is some concern for possible cryoglobulinemia. Cryoglobulin test positive however biopsy does not show cryoglobulinemia. Renal ultrasound showing complex renal cyst in the inferior pole of the left kidney. Nephrology recommended renal biopsy which was done by IR on 8/20/24.      PT/OT recommending moderate intensity rehab at discharge. Patient agreeable to SNF placement. Cleared for discharge by nephrology, cardiology, and GI. Patient will need to follow up with Dr. Mcgraw for treatment/management of Hep C and ongoing liver surveillance. Follow up renal biopsy results with Dr. Peralta next week.  Follow up with Dr. Campbell in 1-2 weeks.     Preliminary results of biopsy did not show cryoglobulinemia. More chronic changes. Needs to follow up with Dr. Peralta in 1 wk.     Pertinent Physical Exam At Time of Discharge  Physical Exam  General: alert, no diaphoresis   Lungs: CTA BL   Heart: RRR, no LE edema BL   GI: abdomen soft, nontender, nondistended, BS present   MSK: no joint effusion or deformity   Skin: no rashes, erythema, or ecchymosis   Neuro: grossly normal cognition, motor strength, sensation   Psych: flat affect    Outpatient Follow-Up  Future Appointments   Date Time Provider Department Center   9/5/2024  9:00 AM Jonathon Campbell DO [address]     Discharge time spent: 45 min    Mary Batres DO

## 2024-08-22 NOTE — PROGRESS NOTES
"Estuardo El is a 74 y.o. male on day 11 of admission presenting with Acute renal failure, unspecified acute renal failure type (CMS-HCC).    Subjective   Seen for acute kidney injury he does have hepatitis C he underwent renal biopsy biopsy results are pending patient feels okay no complaints       Objective     Physical Exam  Neck:      Vascular: No carotid bruit.   Cardiovascular:      Rate and Rhythm: Normal rate and regular rhythm.      Heart sounds: No murmur heard.     No friction rub. No gallop.   Pulmonary:      Breath sounds: No wheezing, rhonchi or rales.   Chest:      Chest wall: No tenderness.   Abdominal:      General: There is no distension.      Tenderness: There is no abdominal tenderness. There is no guarding or rebound.   Musculoskeletal:         General: No swelling or tenderness.      Cervical back: Neck supple.      Right lower leg: No edema.      Left lower leg: No edema.   Lymphadenopathy:      Cervical: No cervical adenopathy.         Last Recorded Vitals  Blood pressure 128/78, pulse 72, temperature 36.1 °C (97 °F), temperature source Temporal, resp. rate 11, height 1.778 m (5' 10\"), weight 96.7 kg (213 lb 3 oz), SpO2 96%.    Intake/Output last 3 Shifts:  I/O last 3 completed shifts:  In: 920 (9.5 mL/kg) [P.O.:920]  Out: 1800 (18.6 mL/kg) [Urine:1800 (0.5 mL/kg/hr)]  Weight: 96.7 kg     Current Facility-Administered Medications:     acetaminophen (Tylenol) tablet 650 mg, 650 mg, oral, q6h PRN, Mary Batres DO, 650 mg at 08/16/24 1519    amLODIPine (Norvasc) tablet 10 mg, 10 mg, oral, Daily, Kayla Boyd MD, 10 mg at 08/22/24 0843    [Held by provider] aspirin EC tablet 81 mg, 81 mg, oral, Daily, Mary Batres DO, 81 mg at 08/11/24 0942    carvedilol (Coreg) tablet 12.5 mg, 12.5 mg, oral, BID, Kayla Boyd MD, 12.5 mg at 08/22/24 0624    heparin (porcine) injection 5,000 Units, 5,000 Units, subcutaneous, q8h, Kayla Boyd MD, 5,000 Units at 08/22/24 0843    hydrALAZINE " (Apresoline) tablet 50 mg, 50 mg, oral, TID, Kayla Boyd MD, 50 mg at 08/22/24 0843    melatonin tablet 6 mg, 6 mg, oral, Nightly, Kayla Boyd MD, 6 mg at 08/21/24 2119    nicotine (Nicoderm CQ) 14 mg/24 hr patch 1 patch, 1 patch, transdermal, Daily, Mary Batres DO, 1 patch at 08/22/24 0842    ondansetron (Zofran) injection 4 mg, 4 mg, intravenous, q6h PRN, Mary Batres DO    perflutren protein A microsphere (Optison) injection 0.5 mL, 0.5 mL, intravenous, Once in imaging, Mary Batres DO    polyethylene glycol (Glycolax, Miralax) packet 17 g, 17 g, oral, Daily, Kayla Boyd MD, 17 g at 08/20/24 0831    sodium bicarbonate tablet 650 mg, 650 mg, oral, BID, René Peralta MD, 650 mg at 08/22/24 0842    sodium zirconium cyclosilicate (Lokelma) packet 10 g, 10 g, oral, Daily, Ayo Peralta MD, 10 g at 08/22/24 0842    sulfur hexafluoride microsphr (Lumason) injection 24.28 mg, 2 mL, intravenous, Once in imaging, Mary Batres DO    traZODone (Desyrel) tablet 50 mg, 50 mg, oral, Nightly, Kayla Boyd MD, 50 mg at 08/21/24 2118   Relevant Results    Results for orders placed or performed during the hospital encounter of 08/11/24 (from the past 96 hour(s))   Comprehensive Metabolic Panel   Result Value Ref Range    Glucose 92 65 - 99 mg/dL    Sodium 138 133 - 145 mmol/L    Potassium 5.1 3.4 - 5.1 mmol/L    Chloride 104 97 - 107 mmol/L    Bicarbonate 23 (L) 24 - 31 mmol/L    Urea Nitrogen 37 (H) 8 - 25 mg/dL    Creatinine 2.80 (H) 0.40 - 1.60 mg/dL    eGFR 23 (L) >60 mL/min/1.73m*2    Calcium 9.3 8.5 - 10.4 mg/dL    Albumin 3.4 (L) 3.5 - 5.0 g/dL    Alkaline Phosphatase 66 35 - 125 U/L    Total Protein 6.6 5.9 - 7.9 g/dL    AST 43 (H) 5 - 40 U/L    Bilirubin, Total 0.5 0.1 - 1.2 mg/dL    ALT 74 (H) 5 - 40 U/L    Anion Gap 11 <=19 mmol/L   CBC and Auto Differential   Result Value Ref Range    WBC 7.6 4.4 - 11.3 x10*3/uL    nRBC 0.0 0.0 - 0.0 /100 WBCs    RBC 3.80 (L) 4.50 - 5.90  x10*6/uL    Hemoglobin 12.5 (L) 13.5 - 17.5 g/dL    Hematocrit 38.5 (L) 41.0 - 52.0 %     (H) 80 - 100 fL    MCH 32.9 26.0 - 34.0 pg    MCHC 32.5 32.0 - 36.0 g/dL    RDW 15.2 (H) 11.5 - 14.5 %    Platelets 141 (L) 150 - 450 x10*3/uL    Neutrophils % 64.8 40.0 - 80.0 %    Immature Granulocytes %, Automated 0.4 0.0 - 0.9 %    Lymphocytes % 18.3 13.0 - 44.0 %    Monocytes % 14.8 2.0 - 10.0 %    Eosinophils % 1.2 0.0 - 6.0 %    Basophils % 0.5 0.0 - 2.0 %    Neutrophils Absolute 4.92 1.60 - 5.50 x10*3/uL    Immature Granulocytes Absolute, Automated 0.03 0.00 - 0.50 x10*3/uL    Lymphocytes Absolute 1.39 0.80 - 3.00 x10*3/uL    Monocytes Absolute 1.12 (H) 0.05 - 0.80 x10*3/uL    Eosinophils Absolute 0.09 0.00 - 0.40 x10*3/uL    Basophils Absolute 0.04 0.00 - 0.10 x10*3/uL   Basic Metabolic Panel   Result Value Ref Range    Glucose 101 (H) 65 - 99 mg/dL    Sodium 135 133 - 145 mmol/L    Potassium 5.3 (H) 3.4 - 5.1 mmol/L    Chloride 101 97 - 107 mmol/L    Bicarbonate 24 24 - 31 mmol/L    Urea Nitrogen 34 (H) 8 - 25 mg/dL    Creatinine 2.80 (H) 0.40 - 1.60 mg/dL    eGFR 23 (L) >60 mL/min/1.73m*2    Calcium 9.2 8.5 - 10.4 mg/dL    Anion Gap 10 <=19 mmol/L   CBC   Result Value Ref Range    WBC 7.9 4.4 - 11.3 x10*3/uL    nRBC 0.0 0.0 - 0.0 /100 WBCs    RBC 3.99 (L) 4.50 - 5.90 x10*6/uL    Hemoglobin 12.9 (L) 13.5 - 17.5 g/dL    Hematocrit 40.5 (L) 41.0 - 52.0 %     (H) 80 - 100 fL    MCH 32.3 26.0 - 34.0 pg    MCHC 31.9 (L) 32.0 - 36.0 g/dL    RDW 14.6 (H) 11.5 - 14.5 %    Platelets 145 (L) 150 - 450 x10*3/uL   Basic Metabolic Panel   Result Value Ref Range    Glucose 91 65 - 99 mg/dL    Sodium 134 133 - 145 mmol/L    Potassium 5.4 (H) 3.4 - 5.1 mmol/L    Chloride 102 97 - 107 mmol/L    Bicarbonate 22 (L) 24 - 31 mmol/L    Urea Nitrogen 35 (H) 8 - 25 mg/dL    Creatinine 3.00 (H) 0.40 - 1.60 mg/dL    eGFR 21 (L) >60 mL/min/1.73m*2    Calcium 9.8 8.5 - 10.4 mg/dL    Anion Gap 10 <=19 mmol/L   CBC   Result Value Ref  Range    WBC 7.0 4.4 - 11.3 x10*3/uL    nRBC 0.0 0.0 - 0.0 /100 WBCs    RBC 3.90 (L) 4.50 - 5.90 x10*6/uL    Hemoglobin 12.9 (L) 13.5 - 17.5 g/dL    Hematocrit 38.7 (L) 41.0 - 52.0 %    MCV 99 80 - 100 fL    MCH 33.1 26.0 - 34.0 pg    MCHC 33.3 32.0 - 36.0 g/dL    RDW 14.6 (H) 11.5 - 14.5 %    Platelets 152 150 - 450 x10*3/uL   CBC   Result Value Ref Range    WBC 6.7 4.4 - 11.3 x10*3/uL    nRBC 0.0 0.0 - 0.0 /100 WBCs    RBC 3.61 (L) 4.50 - 5.90 x10*6/uL    Hemoglobin 11.9 (L) 13.5 - 17.5 g/dL    Hematocrit 36.6 (L) 41.0 - 52.0 %     (H) 80 - 100 fL    MCH 33.0 26.0 - 34.0 pg    MCHC 32.5 32.0 - 36.0 g/dL    RDW 14.2 11.5 - 14.5 %    Platelets 145 (L) 150 - 450 x10*3/uL   Basic Metabolic Panel   Result Value Ref Range    Glucose 79 65 - 99 mg/dL    Sodium 137 133 - 145 mmol/L    Potassium 4.9 3.4 - 5.1 mmol/L    Chloride 102 97 - 107 mmol/L    Bicarbonate 24 24 - 31 mmol/L    Urea Nitrogen 40 (H) 8 - 25 mg/dL    Creatinine 3.50 (H) 0.40 - 1.60 mg/dL    eGFR 18 (L) >60 mL/min/1.73m*2    Calcium 8.9 8.5 - 10.4 mg/dL    Anion Gap 11 <=19 mmol/L       Assessment/Plan     Acute kidney injury creatinine is up to 3.5 no uremic symptoms no indication for dialysis therapy we will check the kidney biopsy results today possible discharge today or tomorrow  Hyperkalemia I will treat with dextrose and insulin and start him on Lokelma and switch his diet to renal diet  Hypertensive urgency blood pressure is under control now  Congestive heart failure compensated  Hepatitis C  BPH  Metabolic acidosis  Elevated liver enzymes  Encephalopathy improving           Ayo Peralta MD

## 2024-08-23 LAB
LABORATORY COMMENT REPORT: NORMAL
Lab: NORMAL
PATH REPORT.FINAL DX SPEC: NORMAL
PATH REPORT.GROSS SPEC: NORMAL
PATH REPORT.RELEVANT HX SPEC: NORMAL
PATH REPORT.TOTAL CANCER: NORMAL

## 2024-08-27 ENCOUNTER — PATIENT OUTREACH (OUTPATIENT)
Dept: CASE MANAGEMENT | Facility: HOSPITAL | Age: 74
End: 2024-08-27
Payer: MEDICARE

## 2024-08-27 NOTE — PROGRESS NOTES
Heart Failure Nurse Navigator Transition of Care Phone Call    The role of the HF nurse navigator is to (1) characterize risk profiles of patients with heart failure transitioning from [id number] after hospitalization, (2) recommend interventions to improve care and reduce risks of worse post-hospitalization outcomes.     Assessment  Call attempted to  [facility] . Txf to nurses station, no answer. Txf to unit manager.       Management    Is the patient obtaining daily weights? NO Unit mgr will add this order  If yes, current weight?   Is the patient following diet limitations (2-3g Na, fluid restriction)? YES  Does the patient have a  cardiology follow-up scheduled? YES  If yes, appointment details? Dr. Campbell 9/5/24 in [place]          Jesenia TAPIAN RN  [facility] Clinical Nurse Navigator, CHF  [phone]

## 2024-09-05 ENCOUNTER — APPOINTMENT (OUTPATIENT)
Dept: CARDIOLOGY | Facility: CLINIC | Age: 74
End: 2024-09-05
Payer: MEDICARE

## 2024-09-05 ENCOUNTER — PATIENT OUTREACH (OUTPATIENT)
Dept: CASE MANAGEMENT | Facility: HOSPITAL | Age: 74
End: 2024-09-05
Payer: MEDICARE

## 2024-09-05 NOTE — PROGRESS NOTES
"Heart Failure Nurse Navigator Transition of Care Phone Call    The role of the HF nurse navigator is to (1) characterize risk profiles of patients with heart failure transitioning from [id number] after hospitalization, (2) recommend interventions to improve care and reduce risks of worse post-hospitalization outcomes.     Assessment  Call attempted to patient Rose Marte left AMA from [facility] on 6/4/24 and was not provided any medications or instructions. He states \"I just left, they where holding me against my will\". He states they told him to call his PCP. I did provide him with a phone number for Dr. Pasquale Riley DO who he states is his PCP.    HF Symptoms  Chest pain? No  Shortness of breath? none  Orthopnea? No  Paroxysmal nocturnal dyspnea? No  Edema? No  Weight gain >2lbs in 3 days or 5lbs in 1 week? No    Medications  Is the patient prescribed the following medications?  ACEi/ARB/ARNi? No  BB? Yes  MRA? No  SGLT2i? No  Diuretic? No  Is the patient adherent to prescribed medications? NO Pt does not have any meds. He is only taking tylenol. He will reach out to his doctor today and declined my assistance.    Management    Is the patient obtaining daily weights? NO He has not scale and has no way to get a scale. I asked if his son could get one and he said he would ask but he doubts he will.   If yes, current weight?   Is the patient following diet limitations (2-3g Na, fluid restriction)? NO Estuardo does not seem to feel he needs to follow a specific diet.  Does the patient have a  cardiology follow-up scheduled? NO I had scheduled a follow up with Dr. Campbell on 9/5/24 and pt cancelled that appt. He stated he does not want to see any other doctor but his PCP.    Does the patient require HF education reinforcement? Yes  If yes, what was discussed? We discussed the importance of daily wts and taking meds. He does not seem concerned and I am concerned he is non-compliant. He agreed to have " me continue to follow him.    Jesenia TAPIAN RN  [facility] Clinical Nurse Navigator, CHF  [phone]

## 2024-09-11 ENCOUNTER — TELEPHONE (OUTPATIENT)
Dept: PRIMARY CARE | Facility: CLINIC | Age: 74
End: 2024-09-11
Payer: MEDICARE

## 2024-09-12 ENCOUNTER — PATIENT OUTREACH (OUTPATIENT)
Dept: CASE MANAGEMENT | Facility: HOSPITAL | Age: 74
End: 2024-09-12
Payer: MEDICARE

## 2024-09-12 NOTE — PROGRESS NOTES
Heart Failure Nurse Navigator Transition of Care Phone Call    The role of the HF nurse navigator is to (1) characterize risk profiles of patients with heart failure transitioning from [id number] after hospitalization, (2) recommend interventions to improve care and reduce risks of worse post-hospitalization outcomes.     Assessment  Call attempted to patient .     HF Symptoms  Chest pain? No  Shortness of breath? none  Orthopnea? No  Paroxysmal nocturnal dyspnea? No  Edema? No  Weight gain >2lbs in 3 days or 5lbs in 1 week? Unknown, pt has no scale and no means to get one.    Medications  Is the patient adherent to prescribed medications? NO Pt has no meds, left SNF AMA with no meds and he states he has an appt with his PCP, Dr. Riley, tomorrow but no way to get to that appt. He is going to see if he can get a ride.     Management    Is the patient obtaining daily weights? NO No scale.  Is the patient following diet limitations (2-3g Na, fluid restriction)? NO Non compliant.  Does the patient have a  cardiology follow-up scheduled? NO He cancelled, wants to just see his PCP.    Does the patient require HF education reinforcement? Yes  If yes, what was discussed? We discussed importance of him getting to his follow up appts and getting his meds. He knows he needs to and tells me he is trying. He still has no scale, I suggested he ask his son, who he says comes over every day to check on him, to get him a scale.    Recommendations/Comments:  This patient may be appropriate for H@H if he cannot get to his doctors appts and get his meds.      Jesenia TAPIAN RN  [facility] Clinical Nurse Navigator, CHF  [phone]

## 2024-09-13 ENCOUNTER — OFFICE VISIT (OUTPATIENT)
Dept: PRIMARY CARE | Facility: CLINIC | Age: 74
End: 2024-09-13
Payer: MEDICARE

## 2024-09-13 VITALS
WEIGHT: 190 LBS | SYSTOLIC BLOOD PRESSURE: 165 MMHG | BODY MASS INDEX: 27.2 KG/M2 | HEART RATE: 89 BPM | HEIGHT: 70 IN | DIASTOLIC BLOOD PRESSURE: 112 MMHG

## 2024-09-13 DIAGNOSIS — N18.4 CHRONIC RENAL DISEASE, STAGE IV (MULTI): ICD-10-CM

## 2024-09-13 DIAGNOSIS — F51.02 ADJUSTMENT INSOMNIA: ICD-10-CM

## 2024-09-13 DIAGNOSIS — J44.9 CHRONIC OBSTRUCTIVE PULMONARY DISEASE, UNSPECIFIED COPD TYPE (MULTI): ICD-10-CM

## 2024-09-13 DIAGNOSIS — I70.223 ATHEROSCLEROSIS OF NATIVE ARTERIES OF EXTREMITIES WITH REST PAIN, BILATERAL LEGS (MULTI): ICD-10-CM

## 2024-09-13 DIAGNOSIS — B18.2 CHRONIC HEPATITIS C WITHOUT HEPATIC COMA (MULTI): ICD-10-CM

## 2024-09-13 DIAGNOSIS — F17.200 SMOKER: Primary | ICD-10-CM

## 2024-09-13 DIAGNOSIS — I10 PRIMARY HYPERTENSION: ICD-10-CM
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
## 2024-09-13 PROBLEM — M47.816 DEGENERATIVE JOINT DISEASE (DJD) OF LUMBAR SPINE: Status: ACTIVE | Noted: 2024-09-13

## 2024-09-13 PROBLEM — M47.26 OSTEOARTHRITIS OF SPINE WITH RADICULOPATHY, LUMBAR REGION: Status: ACTIVE | Noted: 2017-05-23

## 2024-09-13 PROBLEM — G47.9 SLEEP DISTURBANCE: Status: ACTIVE | Noted: 2024-09-13

## 2024-09-13 PROBLEM — M62.81 MUSCLE WEAKNESS: Status: ACTIVE | Noted: 2024-09-13

## 2024-09-13 PROBLEM — M48.062 LUMBAR STENOSIS WITH NEUROGENIC CLAUDICATION: Status: ACTIVE | Noted: 2017-08-30

## 2024-09-13 PROBLEM — F17.211 CIGARETTE NICOTINE DEPENDENCE IN REMISSION: Status: ACTIVE | Noted: 2024-09-13

## 2024-09-13 PROBLEM — F12.10 MARIJUANA ABUSE: Status: ACTIVE | Noted: 2024-09-13

## 2024-09-13 PROBLEM — R91.8 LUNG NODULES: Status: ACTIVE | Noted: 2024-09-13

## 2024-09-13 PROBLEM — D53.1 MEGALOBLASTIC ANEMIA DUE TO VITAMIN B12 DEFICIENCY: Status: ACTIVE | Noted: 2024-09-13

## 2024-09-13 PROBLEM — R06.02 SOB (SHORTNESS OF BREATH) ON EXERTION: Status: ACTIVE | Noted: 2024-09-13

## 2024-09-13 PROBLEM — N40.0 BENIGN PROSTATIC HYPERPLASIA: Status: ACTIVE | Noted: 2024-09-13

## 2024-09-13 PROBLEM — J44.89 CHRONIC OBSTRUCTIVE BRONCHITIS (MULTI): Status: ACTIVE | Noted: 2024-09-13

## 2024-09-13 PROBLEM — M62.82 RHABDOMYOLYSIS: Status: ACTIVE | Noted: 2024-09-13

## 2024-09-13 PROBLEM — I11.9 HYPERTENSIVE HEART DISEASE WITHOUT CONGESTIVE HEART FAILURE: Status: ACTIVE | Noted: 2024-09-13

## 2024-09-13 PROBLEM — R73.9 BORDERLINE HYPERGLYCEMIA: Status: ACTIVE | Noted: 2024-09-13
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
## 2024-09-13 PROBLEM — M96.1 POSTLAMINECTOMY SYNDROME, LUMBAR: Status: ACTIVE | Noted: 2024-09-13

## 2024-09-13 PROBLEM — R19.5 POSITIVE COLORECTAL CANCER SCREENING USING COLOGUARD TEST: Status: ACTIVE | Noted: 2024-09-13

## 2024-09-13 PROBLEM — M54.50 LOW BACK PAIN: Status: ACTIVE | Noted: 2024-09-13

## 2024-09-13 PROBLEM — R22.30 MASS OF SHOULDER REGION: Status: ACTIVE | Noted: 2024-09-13

## 2024-09-13 PROBLEM — R41.3 MEMORY CHANGE: Status: ACTIVE | Noted: 2024-09-13

## 2024-09-13 PROCEDURE — 4004F PT TOBACCO SCREEN RCVD TLK: CPT | Performed by: FAMILY MEDICINE

## 2024-09-13 PROCEDURE — 3077F SYST BP >= 140 MM HG: CPT | Performed by: FAMILY MEDICINE

## 2024-09-13 PROCEDURE — 99214 OFFICE O/P EST MOD 30 MIN: CPT | Performed by: FAMILY MEDICINE

## 2024-09-13 PROCEDURE — 1159F MED LIST DOCD IN RCRD: CPT | Performed by: FAMILY MEDICINE

## 2024-09-13 PROCEDURE — 1111F DSCHRG MED/CURRENT MED MERGE: CPT | Performed by: FAMILY MEDICINE

## 2024-09-13 PROCEDURE — 3080F DIAST BP >= 90 MM HG: CPT | Performed by: FAMILY MEDICINE

## 2024-09-13 PROCEDURE — 3008F BODY MASS INDEX DOCD: CPT | Performed by: FAMILY MEDICINE

## 2024-09-13 RX ORDER — CARVEDILOL 12.5 MG/1
12.5 TABLET ORAL
Qty: 180 TABLET | Refills: 0 | Status: SHIPPED | OUTPATIENT
Start: 2024-09-13

## 2024-09-13 RX ORDER — AMLODIPINE BESYLATE 10 MG/1
10 TABLET ORAL DAILY
Qty: 90 TABLET | Refills: 0 | Status: SHIPPED | OUTPATIENT
Start: 2024-09-13

## 2024-09-13 RX ORDER — HYDRALAZINE HYDROCHLORIDE 50 MG/1
50 TABLET, FILM COATED ORAL 3 TIMES DAILY
Qty: 270 TABLET | Refills: 0 | Status: SHIPPED | OUTPATIENT
Start: 2024-09-13

## 2024-09-13 RX ORDER — TRAZODONE HYDROCHLORIDE 50 MG/1
50 TABLET ORAL NIGHTLY
Qty: 90 TABLET | Refills: 0 | Status: SHIPPED | OUTPATIENT
Start: 2024-09-13

## 2024-09-13 ASSESSMENT — ENCOUNTER SYMPTOMS
OCCASIONAL FEELINGS OF UNSTEADINESS: 1
LOSS OF SENSATION IN FEET: 1
DEPRESSION: 0

## 2024-09-13 NOTE — PROGRESS NOTES
Pt comes in for fu from the hospital. Pt states his son took him bc he passed out. Pt states he thinks he was at [facility]. Today he has no concerns.        Chief Complaint:  No chief complaint on file.  History Of Present Illness:   Estuardo El is a 74 y.o. male      Estuardo El is a 74 year old male with a history of hepatitis C, chronic bronchitis, HTN, back pain/back surgery, BPH, depression, who is presenting for a follow up.   Last visit: 2022       Aug 22, 2024 hospital discharge. Pt admitted that he couldn't take it anymore and decided to leave.   74yoM hx of HTN, BPH, hepatitis C, diastolic heart failure who presented with worsening shortness of breath and generalized weakness. In the ED, initial vitals showing significantly elevated blood pressures with SBP in the 200s. Labs significant for acute renal failure (initial creatinine 4.10), elevated troponin, and elevated liver enzymes. Cardiology, nephrology, and GI were consulted. Patient was started on antihypertensives for gradual reduction in blood pressure by cardiology. 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 CT adrenal glands negative.     Echo showing reduced EF of 40-45% with diastolic dysfunction. This is a new diagnosis of systolic heart failure in addition to known diastolic heart failure. Unable to fully initiate GDMT/start ACEi/ARB or diuretic due to renal failure. Patient was noted to have significantly elevated Hep C viral load of 592,000 which is likely the cause of elevated liver enzymes. Patient also reported hx of prior EtOH use which is probably also contributing. Liver US showing hepatic steatosis/steatohepatitis. Unclear etiology of renal failure. Given high Hep C viral load there is some concern for possible cryoglobulinemia. Cryoglobulin test positive however biopsy does not show cryoglobulinemia. Renal ultrasound showing complex renal cyst in the inferior pole of the left kidney. Nephrology recommended renal biopsy  which was done by IR on 8/20/24.      PT/OT recommending moderate intensity rehab at discharge. Patient agreeable to SNF placement. Cleared for discharge by nephrology, cardiology, and GI. Patient will need to follow up with Dr. Mcgraw for treatment/management of Hep C and ongoing liver surveillance. Follow up renal biopsy results with Dr. Peralta next week. Follow up with Dr. Campbell in 1-2 weeks.     Preliminary results of biopsy did not show cryoglobulinemia. More chronic changes. Needs to follow up with Dr. Peralta in 1 wk.     Healthcare team:  Dr. Peralta - nephrology  Dr. Campbell - cardio  Dr. Lundberg- urology  Dr. Mcgraw - GI  Pulm  Psychology  Geriatrics  Vascular medicine          Blood pressure- stable on lisinopril, amlodipine, hctz. Aug 2021 update: he's stopped taking all meds. Feb 2022 update: he's stopped taking all his bp medications, declines bp medications.      Back pain- extra strength tylenol is somewhat helpful. He has right posterior muscle pain in his bottom. Pain usually worse when he gets up from a seated position in his right bottom.   For his back- he has gone to go to [facility] for [facility] therapy. PT is transiently helpful. He states that he is unable to do his HEP. He's had 2 back operations, and they were transiently helpful. Gabapentin is generally helpful 2-3x/day. Uses his friends' narcotics and MJ for pain.   Aug 2021 update: doing ok.   Feb 2022 update: doing ok.           BPH- tamsulosin helpful. he gets up about 2x/night. Drinks 24 oz beer at night and that helps him sleep. [place] drink of choice. Aug 2021- pt elected stopped taking tamsulosin. Feb 2022 update: stopped his tamsulosin, urinating ok, inc frequency, but he is ok with it.           Typical day- was involved with his family and his son and his son's family. Watches TV. Naps often.      Has pulmonology, will follow up routinely. Breathing is doing well. About a 1/2 ppd of small cigars. Smoking cessation counseling done  today. He notes some wheezing with walking. March 2019 CT chest. Recommended one year follow up- pt declines as of June 2020 for repeat CT chest. Aug 2021 update: pt declines re-scan. Feb 2022 update: agreed to reorder CT chest. Sept 2024- agreed on pulm referral.      Hep C- he states that he cannot afford the treatments and he is not planning to stop MJ use.     Son is legal decision maker.             Memory- MMSE 29/30 at 2/27/19 visit.                         Social History:  Living situation- lives alone in an apartment.   Alcohol- none  Retired. He served in the [employer] in [employer].   Illicit Substances- MJ  Diet- eats good.     Tobacco  Packs per day/years/quit date. 1/2 to 1 ppd for 60 years.     Family History:  Cardiac- none  Cancer- none      Immunizations:  states he is up to date      Health Maintenance   Colonoscopy (45-75). 2019- wanted repeat in 2 weeks, pt states he did not get another one.     AAA Screening (male, 65-75 with any hx of smoking)- pt declines.           Mood: good when he smokes MJ.     Sleep: pretty good. MJ is helpful.      Sports/Exercise: walks up and down he hallway.        Review of Systems (BOLD if positive, delete if not asked):     Constitutional:   - fever   - chills   - night sweats  - unexpected weight change  - changes in sleep     Eyes:   - loss of vision  - double vision  - floaters     Ear/Nose/Throat/Mouth:   - sore throat  - hearing changes  - sinus congestion     Cardiovascular:   - chest pain  - chest heaviness  - palpitations  - swelling in ankles     Musculoskeletal:   - bone pain  - muscle pain  - joint pain     Respiratory:   - shortness of breath  - difficulty breathing  - frequent cough  - wheezing     Neurological:   - loss of consciousness  - tremors  - dizzy spells  - numbness   - tingling     Gastrointestinal:   - abdominal pain  - nausea  - vomiting  - constipation  - diarrhea  - bloody stools  - loss of bowel control  - indigestion  - heartburn  - sour  "taste in throat when waking up     Genitourinary:   - urinary incontinence  - increased urinary frequency  - painful urination  - blood in urine     Skin:   - Rash  - lumps or bumps  - worrisome moles     Endocrine:   - excessive thirst  - feeling too hot  - feeling too cold  - feeling tired  - fatigue    Hematologic/Lymphatic:   - swollen glands  - blood clotting problems  - easy bruising.     Psychological:   - feelings of depression  - feelings of anxiety.     Sexual:   - sexual health concerns      Psychological:   - feeling generally happy  - feeling safe at home          Last Recorded Vitals:  Vitals:    09/13/24 1132   BP: (!) 165/112   Pulse: 89   Weight: 86.2 kg (190 lb)   Height: 1.778 m (5' 10\")        Past Medical History:  He has a past medical history of CHF (congestive heart failure) (Multi), Hepatitis C, and Hypertension.     Past Surgical History:  He has a past surgical history that includes Back surgery.     Social History:  He reports that he has been smoking cigarettes. He started smoking about 57 years ago. He has a 57.7 pack-year smoking history. He has never used smokeless tobacco. He reports that he does not currently use alcohol. No history on file for drug use.     Family History:  Family History   Problem Relation Name Age of Onset    Heart attack Father      Heart attack Brother       Allergies:  Patient has no known allergies.     Outpatient Medications:  Current Outpatient Medications   Medication Instructions    amLODIPine (NORVASC) 10 mg, oral, Daily    aspirin 81 mg, oral, Daily    carvedilol (COREG) 12.5 mg, oral, 2 times daily (morning and late afternoon)    hydrALAZINE (APRESOLINE) 50 mg, oral, 3 times daily    melatonin 6 mg, oral, Nightly    nicotine (Nicoderm CQ) 14 mg/24 hr patch 1 patch, transdermal, Daily    sodium bicarbonate 650 mg, oral, 2 times daily    sodium zirconium cyclosilicate (LOKELMA) 10 g, oral, Daily    traZODone (DESYREL) 50 mg, oral, Nightly        Physical " Exam:  GENERAL: Well developed, well nourished, alert and cooperative, and appears to be in no acute distress.     PSYCH: mood pleasant and appropriate     HEAD: normocephalic     EYES: PERRL, EOMI. vision is grossly intact.          NOSE:  Nares patent b/l.   No bleeding nasal polyps. No nasal discharge.     THROAT:    Oropharynx clear.  No inflammation, swelling, exudate, or lesions.   Teeth and gingiva in good general condition.     NECK: Neck supple, non-tender.   No masses, no thyromegaly.  No anterior cervical lymphadenopathy.     CARDIAC:   RRR.   No murmur.    LUNGS: Good respiratory effort.  Clear to auscultation b/l without rales, rhonchi, wheezing.     ABD: soft, nontender  no masses palpated.   No HSM. No R/G.  No CVA tenderness to palpation b/l     GAIT: steady with a cane.              EXTREMITIES: All 4 extremities are warm and well perfused.   Peripheral pulses intact.   No varicosities.   No cyanosis, no pallor.   No peripheral edema.     NEUROLOGICAL: Strength and sensation symmetric and intact throughout all 4 extremities.  CN II-XII grossly intact.  Cerebellar testing normal. Negative Rhomberg's test. No dysdiadochokinesis.  DTR 2+ in all 4 extremities.     SKIN: Skin normal color  no lesions or eruptions.          Last Labs:  CBC -  Lab Results   Component Value Date    WBC 6.7 08/22/2024    HGB 11.9 (L) 08/22/2024    HCT 36.6 (L) 08/22/2024     (H) 08/22/2024     (L) 08/22/2024        CMP -  Lab Results   Component Value Date    CALCIUM 8.9 08/22/2024    PHOS 3.5 08/16/2024    PROT 6.6 08/19/2024    ALBUMIN 3.4 (L) 08/19/2024    AST 43 (H) 08/19/2024    ALT 74 (H) 08/19/2024    ALKPHOS 66 08/19/2024    BILITOT 0.5 08/19/2024        LIPID PANEL -  Lab Results   Component Value Date    CHOL 130 (L) 08/11/2024    TRIG 138 08/11/2024    HDL 22.0 (L) 08/11/2024    CHHDL 5.9 08/11/2024    LDLF 98 02/27/2019    VLDL 16 02/27/2019           Lab Results   Component Value Date      (H) 07/19/2020    HGBA1C 4.8 08/11/2024          US guided percutaneous biopsy renal right  Narrative: Interpreted By:  Nata Richards,   STUDY:  US GUIDED PERCUTANEOUS BIOPSY RENAL RIGHT; 8/19/2024 3:11 pm      INDICATION:  Signs/Symptoms:Percutaneous cortical renal biopsy.      COMPARISON:  None.      ACCESSION NUMBER(S):  [id number]      ORDERING CLINICIAN:  MARLENA ALTMAN      TECHNIQUE:  PROCEDURE PERFORMED:  ULTRASOUND GUIDED  random right kidney BIOPSY ON  August 19, 2024.      PRE-PROCEDURE DIAGNOSIS:  Renal failure      POST-PROCEDURE DIAGNOSIS:  Pending  pathology      INDICATION FOR PROCEDURE:  The patient is a 74 year old  male who presents with renal failure.      STAFF RADIOLOGIST:  Nata Richards MD      ASSISTANT(S):  None      CONSENT:  The risks, benefits, treatment options, potential complications and  personnel involved were discussed with the  patient.  All questions  were answered and consent was obtained. The  patient indicated  he  was willing to proceed.  The staff physician personally verified  consent.      TIME OUT:  A time out was performed immediately prior to procedure start with  the nursing, anesthesia and interventional team, correctly  identifying the patient name, date of birth, procedure, anatomy  (including marking of site and side), patient position, procedure  consent form, relevant diagnostic and radiology test results,  antibiotic administration (when indicated), safety precautions, and  procedure-specific equipment needs.      PROCEDURE:  After performing the time out, the right kidney was localized with  ultrasound, images were obtained and archived.  Skin entry site was  prepped and draped in the usual sterile fashion.  Under direct  ultrasound guidance,  3 passes  were made into the right renal cortex  using an  18 gauge cutting needle.      The procedure was performed by the: Attending radiologist without an  assistant      The attending radiologist performed  the following procedural  activities: Entire procedure      ANESTHESIA:  Local anesthesia was achieved utilizing 10 cc 1% percent lidocaine.  Vital signs were monitored by the nurse.      START TIME/TIMEOUT TIME:  2:45 p.m.      END TIME:  3:15 p.m.      SEDATION TIME:  30 minutes.      COMPLICATIONS:  None      SIGN-OUT DISCUSSION:  Completed.      ESTIMATED BLOOD LOSS:  None.      CONTRAST:  None.      SPECIMENS:  3 core biopsy specimens were obtained, sent fresh to pathology and  reviewed and determined to be adequate.      BIOPSY DEVICE:  18 gauge core biopsy needle.      FINDINGS:  As described above      Impression: ULTRASOUND GUIDED RIGHT RENAL CORTICAL BIOPSY AS DESCRIBED.          Signed by: Nata Richards 8/19/2024 3:31 PM  Dictation workstation:   [id number]         Assessment/Plan   Problem List Items Addressed This Visit             ICD-10-CM    Smoker - Primary F17.200     Other Visit Diagnoses         Codes    Primary hypertension     I10    Adjustment insomnia     F51.02          Hospital follow up over 2 weeks ago.    Multiple specialists referrals made for the  system.    I recommend you establish care with a geriatrics provider due to your complicated medical history.     Follow up with me annually or as needed for sports medicine needs.          Pasquale Riley, DO

## 2024-09-18 ENCOUNTER — PATIENT OUTREACH (OUTPATIENT)
Dept: CASE MANAGEMENT | Facility: HOSPITAL | Age: 74
End: 2024-09-18
Payer: MEDICARE

## 2024-09-18 NOTE — PROGRESS NOTES
Heart Failure Nurse Navigator Transition of Care Phone Call    The role of the HF nurse navigator is to (1) characterize risk profiles of patients with heart failure transitioning from [id number]-[id number] after hospitalization, (2) recommend interventions to improve care and reduce risks of worse post-hospitalization outcomes.     Assessment  Call attempted to patient .     HF Symptoms  Chest pain? No  Shortness of breath? none  Orthopnea? No  Paroxysmal nocturnal dyspnea? No  Edema? No  Weight gain >2lbs in 3 days or 5lbs in 1 week? No    Medications  Is the patient prescribed the following medications?  ACEi/ARB/ARNi? No  BB? Yes  MRA? No  SGLT2i? No  Diuretic? No  Is the patient adherent to prescribed medications? YES    Management    Is the patient obtaining daily weights? YES  If yes, current weight? Couldn't remember  Is the patient following diet limitations (2-3g Na, fluid restriction)? YES  Does the patient have a  cardiology follow-up scheduled? YES  If yes, appointment details? N. Dedrick on 10/31/24  Does the patient require HF education reinforcement? Yes  If yes, what was discussed? We discussed HF symptoms, daily wts and when to call out to the doctor. I also advised him that if he should get low on meds to reach out to his PCP so he does not run out.    Recommendations/Comments:  Estuardo was trying to register his My Chart and was having issues. He only has cell phone access. I registered for him but he was still having issues with logging into the [employer]. I provided him with the My Chart help desk phone # and he will call to get assistance.    Jesenia LOVING RN  [facility] Clinical Nurse Navigator, CHF  [phone]

## 2024-09-25 ENCOUNTER — PATIENT OUTREACH (OUTPATIENT)
Dept: CASE MANAGEMENT | Facility: HOSPITAL | Age: 74
End: 2024-09-25
Payer: MEDICARE

## 2024-09-25 NOTE — PROGRESS NOTES
Heart Failure Nurse Navigator Transition of Care Phone Call    The role of the HF nurse navigator is to (1) characterize risk profiles of patients with heart failure transitioning from [id number] after hospitalization, (2) recommend interventions to improve care and reduce risks of worse post-hospitalization outcomes.     Assessment  Call out to patient .       Recommendations/Comments:  Pt was DC from [facility] on 8/22/24 with no readmissions. I am closing his case at this time.     He was able to get onto My Chart. I reminded him if he gets low on meds to order through My Chart and be sure to follow up with his PCP.      Jesenia TAPIAN RN  [facility] Clinical Nurse Navigator, CHF  [phone]

## 2024-09-30 ENCOUNTER — APPOINTMENT (OUTPATIENT)
Dept: GERIATRIC MEDICINE | Facility: CLINIC | Age: 74
End: 2024-09-30
Payer: MEDICARE

## 2024-10-16 NOTE — PROGRESS NOTES
Primary Care Physician: Pasquale Riley DO  Patient's Location: [facility] [address]    Date of Visit: 10/17/2024  1:00 PM EDT  Location of visit: [facility]   Type of Visit: New    HPI / Summary:   Estuardo El is a very pleasant 74 y.o. male presenting for management of Stage C HFmrEF (last EF 40-45%, diagnosed 08/2024). He has a history of HTN, BPH, hepatitis C, CKD Stage IV (last GFR 8/22/2024: 18), tobacco abuse, marijuana use    Initial CV History:   8/11/2024 - 8/22/2024 (11 days) [facility]  He presented with worsening shortness of breath and generalized weakness. On arrival to the emergency department, his blood pressure was significantly elevated, and labs revealed acute renal failure, elevated troponin, and elevated liver enzymes.  An echocardiogram showing a reduced ejection fraction of 40-45%. The patient's renal failure prevented the immediate initiation of guideline-directed medical therapy.   Also of note:   Hepatitis C: The patient had a significantly elevated hepatitis C viral load. He was found to have steatohepatitis: An ultrasound revealed fatty liver disease.  Renal Failure: The patient developed acute renal failure, which could be multifactorial. Potential causes included the elevated blood pressure, underlying hypertension  Renal Cyst: A complex renal cyst was found on ultrasound  Cryoglobulinemia: While an initial cryoglobulin test was positive, a biopsy did not confirm the presence of cryoglobulinemia    Of note he has a history of severe hypertension and stopped all his medications in August 2021    Interval history:   At homes he walks around apartment. Up and down hallway. He uses a cane. No SOB, no CP, No LE edema. No dizziness    Today:  He is accompanied by: self    He denies: dyspnea at rest, LE swelling, syncope, PND, orthopnea, chest pain, palpitation.    ROS: Full 10 pt review of symptoms of negative unless discussed above.     Objective   Medical  "History:   He has a past medical history of CHF (congestive heart failure), Hepatitis C, and Hypertension.  Surgical Hx:   He has a past surgical history that includes Back surgery.   Social Hx:   He reports that he has been smoking cigarettes. He started smoking about 57 years ago. He has a 57.8 pack-year smoking history. He has never used smokeless tobacco. He reports that he does not currently use alcohol. He reports current drug use. Drug: Marijuana.  Family Hx:   His family history includes Heart attack in his brother and father.   Allergies:   No Known Allergies  Exam:       10/17/2024    12:58 PM 9/13/2024    11:32 AM 8/22/2024    12:04 PM 8/22/2024     7:00 AM 8/22/2024     4:00 AM 8/22/2024    12:00 AM   Vitals   Systolic 118 165 113 128 123 120   Diastolic 70 112 68 78 73 73   Heart Rate 87 89 76  72 79   Temp   36.9 °C (98.4 °F) 36.1 °C (97 °F) 37.1 °C (98.8 °F)    Resp   18  11 15   Height (in) 1.778 m (5' 10\") 1.778 m (5' 10\")       Weight (lb) 190 190   213.19    BMI 27.26 kg/m2 27.26 kg/m2   30.59 kg/m2    BSA (m2) 2.06 m2 2.06 m2   2.19 m2    Visit Report Report Report         Wt Readings from Last 5 Encounters:   10/17/24 86.2 kg (190 lb)   09/13/24 86.2 kg (190 lb)   08/22/24 96.7 kg (213 lb 3 oz)   02/01/22 109 kg (240 lb)   08/10/21 103 kg (228 lb)     GEN: Pleasant, well-appearing, no acute distress. Mildly disheveled  HEENT: JVP not elevated, no icterus. No HJR  CHEST: No wheeze, good air movement.  CV: Normal rate, regular rhythm. Normal S1, inspiratory split S2, no m/r/g  ABD: Soft, ND, NT  EXT: Warm, well perfused, trace LE edema.   NEURO: Pleasant, Oriented to plan    Labs:   CMP:  Recent Labs     08/22/24  0429 08/21/24  0426 08/20/24  0904 08/19/24  0418 08/18/24  0442 08/11/24  0415 08/11/24  0120    134 135 138 139   < > 137   K 4.9 5.4* 5.3* 5.1 4.9   < > 3.8    102 101 104 104   < > 98   CO2 24 22* 24 23* 25   < > 15*   ANIONGAP 11 10 10 11 10   < > >19*   BUN 40* 35* 34* " "37* 39*   < > 44*   CREATININE 3.50* 3.00* 2.80* 2.80* 2.70*   < > 3.90*   EGFR 18* 21* 23* 23* 24*   < > 16*   MG  --   --   --   --  2.30  --  2.20    < > = values in this interval not displayed.     Recent Labs     08/19/24  0418 08/18/24  0442 08/17/24  0520   ALBUMIN 3.4* 3.3* 3.3*   ALKPHOS 66 62 60   ALT 74* 81* 96*   AST 43* 40 42*   BILITOT 0.5 0.5 0.5   CBC:  Recent Labs     08/22/24  0428 08/21/24  0426 08/20/24  0904 08/19/24 0418 08/18/24  0441   WBC 6.7 7.0 7.9 7.6 8.1   HGB 11.9* 12.9* 12.9* 12.5* 12.6*   HCT 36.6* 38.7* 40.5* 38.5* 37.9*   * 152 145* 141* 119*   * 99 102* 101* 100   HEME/ENDO:  Recent Labs     08/11/24  0120 07/20/20  0524 02/27/19  1109   TSH  --  1.23  --    HGBA1C 4.8  --  5.3    CARDIAC:   Recent Labs     08/16/24  0437 07/19/20  0655   *  --    BNP  --  135*     Recent Labs     08/11/24  0415 02/27/19  1109   CHOL 130* 166   LDLF  --  98   LDLCALC 80  --    HDL 22.0* 52.2   TRIG 138 81   MICRO: No results for input(s): \"ESR\", \"CRP\", \"PROCAL\" in the last 51450 hours.No results found for the last 90 days.      Notable Studies:   EKG:   Recent Labs     08/11/24  0346 08/11/24  0253 07/20/20  1049   VENTRATE 105 110 61   ATRRATE 105 110 61   QTCFRED 428 392 424   QRSDUR 86 70 74   QTCCALCB 470 433 424     Encounter Date: 08/11/24   ECG 12 lead   Result Value    Ventricular Rate 105    Atrial Rate 105    MN Interval 142    QRS Duration 86    QT Interval 356    QTC Calculation(Bazett) 470    P Axis 83    R Axis 15    T Axis 248    QRS Count 17    Q Onset 217    P Onset 146    P Offset 187    T Offset 395    QTC Fredericia 428    Narrative    Sinus tachycardia  Possible Left atrial enlargement  Left ventricular hypertrophy with repolarization abnormality  ST & T wave abnormality, consider lateral ischemia  Abnormal ECG  When compared with ECG of 20-JUL-2020 10:49,  Significant changes have occurred  Confirmed by Samir Sams ([id number]) on 8/12/2024 9:44:46 PM "         Echocardiogram:   Recent Labs     08/12/24  1132   EF 43   LVIDD 4.98   RV 27.8   RVFRWALLPKSP 11.17   TAPSE 2.1   Transthoracic Echo (TTE) Complete With Contrast 08/12/2024  1. Left ventricular ejection fraction is mildly decreased, by visual estimate at 40-45%.  2. Spectral Doppler shows an impaired relaxation pattern of left ventricular diastolic filling.  3. There is low normal right ventricular systolic function.      Coronary Angiography: No results found for this or any previous visit from the past 1800 days.    Right Heart Cath: No results found for this or any previous visit from the past 1800 days.    Cardiac Scoring: No results found for this or any previous visit from the past 1800 days.    Cardiac MRI: No results found for this or any previous visit from the past 1800 days.    Nuclear:No results found for this or any previous visit from the past 1800 days.    Metabolic Stress: No results found for this or any previous visit from the past 1800 days.    CT chest 12/2023  1. Left apical ground-glass nodular densities are stable. Continued  screening with low-dose noncontrast chest CT in 12 months (from  current date) is recommended.  2. Mild upper lung predominant emphysema  3. Mild coronary artery calcification.      Current Outpatient Medications   Medication Instructions    amLODIPine (NORVASC) 10 mg, oral, Daily    aspirin 81 mg, oral, Daily    carvedilol (COREG) 12.5 mg, oral, 2 times daily (morning and late afternoon)    hydrALAZINE (APRESOLINE) 50 mg, oral, 3 times daily    melatonin 6 mg, oral, Nightly    nicotine (Nicoderm CQ) 14 mg/24 hr patch 1 patch, transdermal, Daily    sodium bicarbonate 650 mg, oral, 2 times daily    traZODone (DESYREL) 50 mg, oral, Nightly      Notable Therapies: *GDMT*   Class  Agent SAFETY    *ARNI* / ACE / ARB   Last BP: 118/70, Last BUN/Cr (GFR): 8/22/2024: 40/3.50 (18)    *Beta-Blocker*  carvedilol 12.5mg BID Last HR: 87    *MRA*   Last K: 8/22/2024: 4.9      *SGLT2*   Last A1c 8/11/2024: 4.8     Diuretic   Last BNP: 7/19/2020: 135    Hydralazine/ISDN  hydrALAZINE 50 TID     Digoxin  Last Digoxin level: No results found for requested labs within last 3650 days.    Anticoagulation   Last Hgb: 8/22/2024: 11.9    Anti-arrhythmic   Last QTc: 8/11/2024: 470    Antiplatelet  Aspirin 81mg daily Last Platelet: 8/22/2024: 145    Lipid-Lowering   Last Tchol 8/11/2024: 130 / LDL 2/27/2019: 98 or 8/11/2024: 80 / HDL 8/11/2024: 22.0 / TRIG 8/11/2024: 138    Other   Amlodipine 10mg daily     Device(s)   EF: 8/12/2024: 43%, QRS: 8/11/2024: 86ms    Cardiac Rehab,   if EF <35/MI/OHS        Problems Addressed:   # HFmrEF / Chronic Systolic Heart Failure, last EF 8/12/2024: 43%, dx'd 08/2024, ACC/AHA Stage C, NYHA II, Warm, Euvolemic. No ischemic evaluation as of yet. Suspect HTN related given off meds for many years with BP in 200s on presentation. Will obtain CMR with stress for ischemic eval. Continue CCB, Hydral and carvedilol. Defer ARNI/MRA/SGLT2i given renal function.   # Hypertension: Last BP: 118/70. Controlled on medical therapy. Had been off meds for years  # Hyperlipidemia: Last Tchol 8/11/2024: 130 / LDL 80 / HDL 22.0 / TRIG 138. Declines statin  # CKD Stage IV: Last BUN/Cr (GFR): 8/22/2024: 40/3.50 (18). REcommend nephrology evaluation  # Tobacco Use: He reports that he has been smoking cigarettes. He started smoking about 57 years ago. He has a 57.8 pack-year smoking history. He has never used smokeless tobacco.. 3 minutes spent discussing cessation      Patient Instructions   If you have any questions or need cardiac medication refills, please call the Heart Failure office at [phone], option 6.  You may also contact the  Heart Failure Nursing team via email at [email or web address] (Please include your name and date of birth). To reach Dr. Portillo's office please call [phone] / Fax: [phone]. To schedule an office appointment call [phone].      If I placed a referral today for a specialist/procedure, please call the general scheduling line at [phone]. You may also schedule appointments on the [employer] lora. For radiology scheduling (Cardiac calcium score, CTA with HeartFlow, Cardiac MRI, NM cardiac stress test, PET viability study) the phone number is [phone].     - Work to quit smoking  - Continue current medications  - Labwork: today  - Imaging/Procedures: Please obtain a Cardiac MRI. Call [phone] to schedule.  - Referrals: nephrology  - Followup: 3 months       Orders:  No orders of the defined types were placed in this encounter.     Followup Appts:  Future Appointments   Date Time Provider Department [place]   10/31/2024 10:00 AM Stephy Felipe MD, MS [id number]   12/12/2024  8:30 AM [facility] GERIATRICS RN [address]   12/12/2024  9:00 AM Beth P Wachter, LISW [address]   12/12/2024  9:30 AM MICHEL Aguilar-CNP [address]     Time Spent: I spent 45 minutes reviewing medical testing, obtaining medical history and counselling and educating on diagnosis and documenting clinical encounter. The encounter involved a comprehensive review of extensive data, including prior medical records, imaging studies, laboratory results, and consultations, followed by in-depth counseling regarding the patient's complex cardiac condition and comorbidities. We discussed treatment options, risks and benefits, and recommendations for ongoing care and careful monitoring of adverse effects from medications.     ____________________________________________________________  Hardeep Portillo MD  Section of Advanced Heart Failure and Cardiac Transplantation  Division of Cardiovascular Medicine  [facility] [place]  [facility]

## 2024-10-17 ENCOUNTER — OFFICE VISIT (OUTPATIENT)
Dept: CARDIOLOGY | Facility: HOSPITAL | Age: 74
End: 2024-10-17
Payer: MEDICARE

## 2024-10-17 VITALS
DIASTOLIC BLOOD PRESSURE: 70 MMHG | HEART RATE: 87 BPM | BODY MASS INDEX: 27.2 KG/M2 | HEIGHT: 70 IN | WEIGHT: 190 LBS | SYSTOLIC BLOOD PRESSURE: 118 MMHG

## 2024-10-17 DIAGNOSIS — I50.22 HEART FAILURE WITH MID-RANGE EJECTION FRACTION (HFMEF): Primary | ICD-10-CM

## 2024-10-17 DIAGNOSIS — I70.223 ATHEROSCLEROSIS OF NATIVE ARTERIES OF EXTREMITIES WITH REST PAIN, BILATERAL LEGS (MULTI): ICD-10-CM

## 2024-10-17 DIAGNOSIS — N18.4 CHRONIC RENAL DISEASE, STAGE IV (MULTI): ICD-10-CM

## 2024-10-17 DIAGNOSIS — I10 PRIMARY HYPERTENSION: ICD-10-CM

## 2024-10-17 DIAGNOSIS — Z72.0 TOBACCO ABUSE: ICD-10-CM

## 2024-10-17 PROCEDURE — 1159F MED LIST DOCD IN RCRD: CPT | Performed by: STUDENT IN AN ORGANIZED HEALTH CARE EDUCATION/TRAINING PROGRAM

## 2024-10-17 PROCEDURE — 3008F BODY MASS INDEX DOCD: CPT | Performed by: STUDENT IN AN ORGANIZED HEALTH CARE EDUCATION/TRAINING PROGRAM

## 2024-10-17 PROCEDURE — 99406 BEHAV CHNG SMOKING 3-10 MIN: CPT | Performed by: STUDENT IN AN ORGANIZED HEALTH CARE EDUCATION/TRAINING PROGRAM

## 2024-10-17 PROCEDURE — 3074F SYST BP LT 130 MM HG: CPT | Performed by: STUDENT IN AN ORGANIZED HEALTH CARE EDUCATION/TRAINING PROGRAM

## 2024-10-17 PROCEDURE — 99205 OFFICE O/P NEW HI 60 MIN: CPT | Performed by: STUDENT IN AN ORGANIZED HEALTH CARE EDUCATION/TRAINING PROGRAM

## 2024-10-17 PROCEDURE — 4004F PT TOBACCO SCREEN RCVD TLK: CPT | Performed by: STUDENT IN AN ORGANIZED HEALTH CARE EDUCATION/TRAINING PROGRAM

## 2024-10-17 PROCEDURE — 3078F DIAST BP <80 MM HG: CPT | Performed by: STUDENT IN AN ORGANIZED HEALTH CARE EDUCATION/TRAINING PROGRAM

## 2024-10-17 PROCEDURE — 99215 OFFICE O/P EST HI 40 MIN: CPT | Performed by: STUDENT IN AN ORGANIZED HEALTH CARE EDUCATION/TRAINING PROGRAM

## 2024-10-17 NOTE — PATIENT INSTRUCTIONS
If you have any questions or need cardiac medication refills, please call the Heart Failure office at [phone], option 6.  You may also contact the  Heart Failure Nursing team via email at [email or web address] (Please include your name and date of birth). To reach Dr. Portillo's office please call [phone] / Fax: [phone]. To schedule an office appointment call [phone].     If I placed a referral today for a specialist/procedure, please call the general scheduling line at [phone]. You may also schedule appointments on the [employer] lora. For radiology scheduling (Cardiac calcium score, CTA with HeartFlow, Cardiac MRI, NM cardiac stress test, PET viability study) the phone number is [phone].     - Work to quit smoking  - Continue current medications  - Labwork: today  - Imaging/Procedures: Please obtain a Cardiac MRI. Call [phone] to schedule.  - Referrals: nephrology  - Followup: 3 months

## 2024-10-17 NOTE — PROGRESS NOTES
74 year old male here for heart failure evaluation.     PM/SHx: HTN, heart failure, atherosclerosis of native arteries of BLE, acut renal failure, hep c, depression, rhabdomyolysis, BPH,   Social Hx: Denies ETOH. Patient states he smokes 1/2 pack a day and used marijuana daily.   Family Hx: Patient states brother  of MI.     Interval Hx:  Currently denies chest pain, palpitations, shortness of breath, dyspnea on exertion, orthopnea, PND. No edema noted in BLE. Patient denies headaches, dizziness or recent falls.   Patient presents today using a walker.     Hospitalizations: Admitted 2024 - 2024 at [facility] for acute renal failure

## 2024-11-25 DIAGNOSIS — F51.02 ADJUSTMENT INSOMNIA: ICD-10-CM

## 2024-11-25 DIAGNOSIS — I10 PRIMARY HYPERTENSION: ICD-10-CM

## 2024-11-25 RX ORDER — TRAZODONE HYDROCHLORIDE 50 MG/1
50 TABLET ORAL NIGHTLY
Qty: 90 TABLET | Refills: 0 | Status: SHIPPED | OUTPATIENT
Start: 2024-11-25

## 2024-11-25 RX ORDER — AMLODIPINE BESYLATE 10 MG/1
10 TABLET ORAL DAILY
Qty: 90 TABLET | Refills: 0 | Status: SHIPPED | OUTPATIENT
Start: 2024-11-25

## 2024-11-27 DIAGNOSIS — I10 PRIMARY HYPERTENSION: ICD-10-CM

## 2024-12-02 RX ORDER — HYDRALAZINE HYDROCHLORIDE 50 MG/1
50 TABLET, FILM COATED ORAL 3 TIMES DAILY
Qty: 270 TABLET | Refills: 0 | Status: SHIPPED | OUTPATIENT
Start: 2024-12-02

## 2024-12-02 RX ORDER — CARVEDILOL 12.5 MG/1
12.5 TABLET ORAL
Qty: 180 TABLET | Refills: 0 | Status: SHIPPED | OUTPATIENT
Start: 2024-12-02

## 2024-12-04 ENCOUNTER — APPOINTMENT (OUTPATIENT)
Dept: PRIMARY CARE | Facility: CLINIC | Age: 74
End: 2024-12-04
Payer: COMMERCIAL

## 2024-12-10 ENCOUNTER — HOSPITAL ENCOUNTER (OUTPATIENT)
Dept: RADIOLOGY | Facility: HOSPITAL | Age: 74
End: 2024-12-10
Payer: MEDICARE

## 2024-12-12 ENCOUNTER — APPOINTMENT (OUTPATIENT)
Dept: GERIATRIC MEDICINE | Facility: CLINIC | Age: 74
End: 2024-12-12
Payer: MEDICARE

## 2024-12-16 ENCOUNTER — APPOINTMENT (OUTPATIENT)
Dept: CARDIOLOGY | Facility: CLINIC | Age: 74
End: 2024-12-16
Payer: MEDICARE

## 2024-12-19 ENCOUNTER — OFFICE VISIT (OUTPATIENT)
Dept: PRIMARY CARE | Facility: CLINIC | Age: 74
End: 2024-12-19
Payer: COMMERCIAL

## 2024-12-19 VITALS
WEIGHT: 201 LBS | SYSTOLIC BLOOD PRESSURE: 150 MMHG | BODY MASS INDEX: 28.77 KG/M2 | DIASTOLIC BLOOD PRESSURE: 90 MMHG | HEART RATE: 80 BPM | HEIGHT: 70 IN

## 2024-12-19 DIAGNOSIS — Z12.12 SCREENING FOR COLORECTAL CANCER: ICD-10-CM

## 2024-12-19 DIAGNOSIS — Z00.00 ROUTINE GENERAL MEDICAL EXAMINATION AT HEALTH CARE FACILITY: Primary | ICD-10-CM

## 2024-12-19 DIAGNOSIS — Z12.5 SCREENING FOR PROSTATE CANCER: ICD-10-CM

## 2024-12-19 DIAGNOSIS — Z12.11 SCREENING FOR COLORECTAL CANCER: ICD-10-CM

## 2024-12-19 DIAGNOSIS — Z23 INFLUENZA VACCINE NEEDED: ICD-10-CM

## 2024-12-19 PROCEDURE — 4004F PT TOBACCO SCREEN RCVD TLK: CPT | Performed by: FAMILY MEDICINE

## 2024-12-19 PROCEDURE — 1159F MED LIST DOCD IN RCRD: CPT | Performed by: FAMILY MEDICINE

## 2024-12-19 PROCEDURE — G0439 PPPS, SUBSEQ VISIT: HCPCS | Performed by: FAMILY MEDICINE

## 2024-12-19 PROCEDURE — 3080F DIAST BP >= 90 MM HG: CPT | Performed by: FAMILY MEDICINE

## 2024-12-19 PROCEDURE — 1124F ACP DISCUSS-NO DSCNMKR DOCD: CPT | Performed by: FAMILY MEDICINE

## 2024-12-19 PROCEDURE — 3008F BODY MASS INDEX DOCD: CPT | Performed by: FAMILY MEDICINE

## 2024-12-19 PROCEDURE — 1160F RVW MEDS BY RX/DR IN RCRD: CPT | Performed by: FAMILY MEDICINE

## 2024-12-19 PROCEDURE — G0008 ADMIN INFLUENZA VIRUS VAC: HCPCS | Performed by: FAMILY MEDICINE

## 2024-12-19 PROCEDURE — 3077F SYST BP >= 140 MM HG: CPT | Performed by: FAMILY MEDICINE

## 2024-12-19 PROCEDURE — 1170F FXNL STATUS ASSESSED: CPT | Performed by: FAMILY MEDICINE

## 2024-12-19 PROCEDURE — 90656 IIV3 VACC NO PRSV 0.5 ML IM: CPT | Performed by: FAMILY MEDICINE

## 2024-12-19 ASSESSMENT — ENCOUNTER SYMPTOMS
ARTHRALGIAS: 1
DEPRESSION: 0
COUGH: 1
CHEST TIGHTNESS: 0
FEVER: 0
OCCASIONAL FEELINGS OF UNSTEADINESS: 0
LOSS OF SENSATION IN FEET: 0
HEADACHES: 1
CONFUSION: 0
WHEEZING: 0
FREQUENCY: 1
ABDOMINAL PAIN: 0
HEMATURIA: 0
BLOOD IN STOOL: 0

## 2024-12-19 ASSESSMENT — ACTIVITIES OF DAILY LIVING (ADL)
DOING_HOUSEWORK: INDEPENDENT
MANAGING_FINANCES: INDEPENDENT
BATHING: INDEPENDENT
TAKING_MEDICATION: INDEPENDENT
BATHING: INDEPENDENT
GROCERY_SHOPPING: INDEPENDENT
DOING_HOUSEWORK: INDEPENDENT
DRESSING: INDEPENDENT
TAKING_MEDICATION: INDEPENDENT
MANAGING_FINANCES: INDEPENDENT
DRESSING: INDEPENDENT
GROCERY_SHOPPING: INDEPENDENT

## 2024-12-19 ASSESSMENT — PATIENT HEALTH QUESTIONNAIRE - PHQ9
1. LITTLE INTEREST OR PLEASURE IN DOING THINGS: NOT AT ALL
2. FEELING DOWN, DEPRESSED OR HOPELESS: NOT AT ALL
SUM OF ALL RESPONSES TO PHQ9 QUESTIONS 1 AND 2: 0

## 2024-12-19 NOTE — PROGRESS NOTES
Subjective   Reason for Visit: Estuardo El is an 74 y.o. male here for a Medicare Wellness visit.      No concerns.         HPI      Estuardo El is a 74 year old male with a history of hepatitis C, chronic bronchitis, HTN, back pain/back surgery, BPH, depression, who is presenting for a follow up.   Last visit: 2022        Aug 22, 2024 hospital discharge. Pt admitted that he couldn't take it anymore and decided to leave.   74yoM hx of HTN, BPH, hepatitis C, diastolic heart failure who presented with worsening shortness of breath and generalized weakness. In the ED, initial vitals showing significantly elevated blood pressures with SBP in the 200s. Labs significant for acute renal failure (initial creatinine 4.10), elevated troponin, and elevated liver enzymes. Cardiology, nephrology, and GI were consulted. Patient was started on antihypertensives for gradual reduction in blood pressure by cardiology. 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 CT adrenal glands negative.     Echo showing reduced EF of 40-45% with diastolic dysfunction. This is a new diagnosis of systolic heart failure in addition to known diastolic heart failure. Unable to fully initiate GDMT/start ACEi/ARB or diuretic due to renal failure. Patient was noted to have significantly elevated Hep C viral load of 592,000 which is likely the cause of elevated liver enzymes. Patient also reported hx of prior EtOH use which is probably also contributing. Liver US showing hepatic steatosis/steatohepatitis. Unclear etiology of renal failure. Given high Hep C viral load there is some concern for possible cryoglobulinemia. Cryoglobulin test positive however biopsy does not show cryoglobulinemia. Renal ultrasound showing complex renal cyst in the inferior pole of the left kidney. Nephrology recommended renal biopsy which was done by IR on 8/20/24.      PT/OT recommending moderate intensity rehab at discharge. Patient agreeable to SNF placement.  Cleared for discharge by nephrology, cardiology, and GI. Patient will need to follow up with Dr. Mcgraw for treatment/management of Hep C and ongoing liver surveillance. Follow up renal biopsy results with Dr. Peralta next week. Follow up with Dr. Campbell in 1-2 weeks.     Preliminary results of biopsy did not show cryoglobulinemia. More chronic changes. Needs to follow up with Dr. Peralta in 1 wk.     Healthcare team:  Dr. Peralta - nephrology  Dr. Campbell - cardio  Dr. Lundberg- urology  Dr. Mcgraw - GI  Pulm  Psychology  Geriatrics  Vascular medicine           Blood pressure- stable on lisinopril, amlodipine, hctz. Aug 2021 update: he's stopped taking all meds. Feb 2022 update: he's stopped taking all his bp medications, declines bp medications.      Back pain- extra strength tylenol is somewhat helpful. He has right posterior muscle pain in his bottom. Pain usually worse when he gets up from a seated position in his right bottom.   For his back- he has gone to go to the [facility] for aquatic therapy. PT is transiently helpful. He states that he is unable to do his HEP. He's had 2 back operations, and they were transiently helpful. Gabapentin is generally helpful 2-3x/day. Uses his friends' narcotics and MJ for pain.   Aug 2021 update: doing ok.   Feb 2022 update: doing ok.            BPH- tamsulosin helpful. he gets up about 2x/night. Drinks 24 oz beer at night and that helps him sleep. [place] drink of choice. Aug 2021- pt elected stopped taking tamsulosin. Feb 2022 update: stopped his tamsulosin, urinating ok, inc frequency, but he is ok with it.            Typical day- was involved with his family and his son and his son's family. Watches TV. Naps often.      Has pulmonology, will follow up routinely. Breathing is doing well. About a 1/2 ppd of small cigars. Smoking cessation counseling done today. He notes some wheezing with walking. March 2019 CT chest. Recommended one year follow up- pt declines as of June 2020 for  repeat CT chest. Aug 2021 update: pt declines re-scan. Feb 2022 update: agreed to reorder CT chest. Sept 2024- agreed on pulm referral.      Hep C- he states that he cannot afford the treatments and he is not planning to stop MJ use.      Son is legal decision maker.               Memory- MMSE 29/30 at 2/27/19 visit.                              Social History:  Living situation- lives alone in an apartment.   Alcohol- none  Retired. He served in the [employer] in [employer].   Illicit Substances- MJ  Diet- eats good.      Tobacco  Packs per day/years/quit date. 1/2 to 1 ppd for 60 years.      Family History:  Cardiac- none  Cancer- none       Immunizations:    Flu shot: 12/19/24        Health Maintenance   Colonoscopy (45-75). 2019- wanted repeat in 2 weeks, pt states he did not get another one.   Cologuard: ordered 12/19/24     AAA Screening (male, 65-75 with any hx of smoking)- pt declines.            Mood: good when he smokes MJ.     Sleep: pretty good. MJ is helpful.      Sports/Exercise: walks up and down he hallway.       Patient Care Team:  Pasquale Riley DO as PCP - General (Sports Medicine)  Jesenia Mathur, RN as Registered Nurse     Review of Systems   Constitutional:  Negative for fever.   Respiratory:  Positive for cough. Negative for chest tightness and wheezing.    Cardiovascular:  Negative for chest pain.   Gastrointestinal:  Negative for abdominal pain and blood in stool.   Genitourinary:  Positive for frequency. Negative for hematuria.   Musculoskeletal:  Positive for arthralgias.   Skin:  Negative for rash.   Neurological:  Positive for headaches.   Psychiatric/Behavioral:  Negative for confusion and suicidal ideas.          Objective   Vitals:  There were no vitals taken for this visit.      Physical Exam  Constitutional:       Appearance: Normal appearance.   HENT:      Head: Normocephalic.   Cardiovascular:      Rate and Rhythm: Normal rate and regular rhythm.   Pulmonary:      Breath sounds:  Normal breath sounds.   Abdominal:      Palpations: Abdomen is soft.   Neurological:      General: No focal deficit present.      Mental Status: He is alert and oriented to person, place, and time.   Psychiatric:         Mood and Affect: Mood normal.         Behavior: Behavior normal.         Thought Content: Thought content normal.         Assessment & Plan  Routine general medical examination at health care facility    Orders:    1 Year Follow Up In Primary Care - Wellness Exam; Future    Screening for colorectal cancer    Orders:    Cologuard®; Future    Cologuard®    Screening for prostate cancer    Orders:    PSA screen; Future         Medicare wellness visit.    Cologuard test.    Blood work ordered.       Blood pressure a little high in the office. Goal less than 140/90  Hydralazine 50mg three times a day  Coreg 12.5mg twice a day    Geriatric referral.  Follow up with either new PCP or myself in 6 months.

## 2024-12-24 ENCOUNTER — APPOINTMENT (OUTPATIENT)
Dept: RADIOLOGY | Facility: HOSPITAL | Age: 74
End: 2024-12-24
Payer: COMMERCIAL

## 2024-12-30 ENCOUNTER — TELEPHONE (OUTPATIENT)
Dept: RADIOLOGY | Facility: HOSPITAL | Age: 74
End: 2024-12-30
Payer: COMMERCIAL

## 2025-01-02 ENCOUNTER — OFFICE VISIT (OUTPATIENT)
Dept: CARDIOLOGY | Facility: CLINIC | Age: 75
End: 2025-01-02
Payer: COMMERCIAL

## 2025-01-02 VITALS
HEART RATE: 86 BPM | DIASTOLIC BLOOD PRESSURE: 121 MMHG | SYSTOLIC BLOOD PRESSURE: 190 MMHG | BODY MASS INDEX: 28.7 KG/M2 | WEIGHT: 200 LBS | OXYGEN SATURATION: 94 %

## 2025-01-02 DIAGNOSIS — I10 ESSENTIAL HYPERTENSION: ICD-10-CM

## 2025-01-02 DIAGNOSIS — F17.200 TOBACCO DEPENDENCE: Primary | ICD-10-CM

## 2025-01-02 PROBLEM — F17.211 CIGARETTE NICOTINE DEPENDENCE IN REMISSION: Status: RESOLVED | Noted: 2024-09-13 | Resolved: 2025-01-02

## 2025-01-02 PROCEDURE — 99203 OFFICE O/P NEW LOW 30 MIN: CPT | Performed by: INTERNAL MEDICINE

## 2025-01-02 PROCEDURE — 99213 OFFICE O/P EST LOW 20 MIN: CPT | Performed by: INTERNAL MEDICINE

## 2025-01-02 PROCEDURE — 1159F MED LIST DOCD IN RCRD: CPT | Performed by: INTERNAL MEDICINE

## 2025-01-02 PROCEDURE — 3077F SYST BP >= 140 MM HG: CPT | Performed by: INTERNAL MEDICINE

## 2025-01-02 PROCEDURE — 3080F DIAST BP >= 90 MM HG: CPT | Performed by: INTERNAL MEDICINE

## 2025-01-02 PROCEDURE — 1160F RVW MEDS BY RX/DR IN RCRD: CPT | Performed by: INTERNAL MEDICINE

## 2025-01-02 NOTE — PROGRESS NOTES
"Referred by Pasquale Riley DO      History of Present Illness:  Estuardo El is a/an 74 y.o. man with HTN, heart failure, active hepatitis C infection referred for \"atherosclerosis of native arteries of extremities with rest pain, bilateral legs.\" He does not carry a diagnosis of PAD. Normal NOAM in the past. History of L3-L5 decompression at [facility] for radiculopathy in 2017.    Reports that he has stiffness and unsteady gait but denies leg pain beyond that. No rest pain. No claudication.    He has long tobacco history, current active smoker, pre-contemplative. He is at risk for developing PAD.    Past Medical History:   Diagnosis Date    CHF (congestive heart failure)     Hepatitis C     Hypertension      Past Surgical History:   Procedure Laterality Date    BACK SURGERY       Social History     Tobacco Use    Smoking status: Every Day     Current packs/day: 1.00     Average packs/day: 1 pack/day for 58.0 years (58.0 ttl pk-yrs)     Types: Cigarettes     Start date: 1967    Smokeless tobacco: Never   Substance Use Topics    Alcohol use: Not Currently    Drug use: Yes     Types: Marijuana     Comment: Daily     Family History   Problem Relation Name Age of Onset    Heart attack Father      Heart attack Brother       Current Outpatient Medications   Medication Sig Dispense Refill    amLODIPine (Norvasc) 10 mg tablet Take 1 tablet (10 mg) by mouth once daily. 90 tablet 0    aspirin 81 mg EC tablet Take 1 tablet (81 mg) by mouth once daily.      carvedilol (Coreg) 12.5 mg tablet TAKE 1 TABLET (12.5 MG) BY MOUTH 2 TIMES DAILY (MORNING AND LATE AFTERNOON). 180 tablet 0    hydrALAZINE (Apresoline) 50 mg tablet TAKE 1 TABLET BY MOUTH THREE TIMES A  tablet 0    melatonin 3 mg tablet Take 2 tablets (6 mg) by mouth once daily at bedtime.      nicotine (Nicoderm CQ) 14 mg/24 hr patch Place 1 patch over 24 hours on the skin once daily. (Patient not taking: Reported on 10/17/2024)      sodium bicarbonate 650 " mg tablet Take 1 tablet (650 mg) by mouth 2 times a day.      traZODone (Desyrel) 50 mg tablet Take 1 tablet (50 mg) by mouth once daily at bedtime. 90 tablet 0     No current facility-administered medications for this visit.       Physical Examination:  Blood pressure (!) 190/121, pulse 86, weight 90.7 kg (200 lb), SpO2 94%.  General: well-developed, well-nourished, no distress  Head: normocephalic, atraumatic  Eyes: PERRL, anicteric sclerae, no conjunctival injection  ENT: normal oropharynx  Neck: supple, no carotid bruits, no JVD  Lungs: normal respiratory effort, clear to auscultation bilaterally  Heart: regular, normal S1 and S2, no murmurs, rubs or gallops  Abdomen: normal active bowel sounds, soft, non-distended, non-tender  Extremities: no cyanosis or clubbing  Vascular: bilateral leg swelling, pulses 2+ and symmetric  Musculoskeletal: no deformities  Neurological: alert and oriented, no gross neurological deficits  Psychological: normal mood and affect   Skin: warm and dry, no rashes or lesions        Pertinent Labs:    Pertinent Imaging:  PVR 2020  CONCLUSIONS:  Right Lower PVR: No evidence of arterial occlusive disease in the right lower extremity at rest. Normal digital perfusion noted. Triphasic flow is noted in the right common femoral artery, right posterior tibial artery and right dorsalis pedis artery.  Left Lower PVR: No evidence of arterial occlusive disease in the left lower extremity at rest. Normal digital perfusion noted. Triphasic flow is noted in the left common femoral artery, left posterior tibial artery and left dorsalis pedis artery.      Diagnoses and all orders for this visit:  Tobacco dependence (Primary)  Essential hypertension  Risk factors for PAD but no symptoms and normal PVR  Discharge from vascular medicine    Stephy Felipe MD, MS

## 2025-01-16 ENCOUNTER — APPOINTMENT (OUTPATIENT)
Dept: CARDIOLOGY | Facility: HOSPITAL | Age: 75
End: 2025-01-16

## 2025-03-01 DIAGNOSIS — I10 PRIMARY HYPERTENSION: ICD-10-CM

## 2025-03-03 RX ORDER — CARVEDILOL 12.5 MG/1
12.5 TABLET ORAL
Qty: 180 TABLET | Refills: 0 | Status: SHIPPED | OUTPATIENT
Start: 2025-03-03

## 2025-03-03 RX ORDER — HYDRALAZINE HYDROCHLORIDE 50 MG/1
50 TABLET, FILM COATED ORAL 3 TIMES DAILY
Qty: 270 TABLET | Refills: 0 | Status: SHIPPED | OUTPATIENT
Start: 2025-03-03

## 2025-05-29 DIAGNOSIS — I10 PRIMARY HYPERTENSION: ICD-10-CM

## 2025-05-29 RX ORDER — CARVEDILOL 12.5 MG/1
12.5 TABLET ORAL
Qty: 180 TABLET | Refills: 0 | OUTPATIENT
Start: 2025-05-29

## 2025-05-29 RX ORDER — HYDRALAZINE HYDROCHLORIDE 50 MG/1
50 TABLET, FILM COATED ORAL 3 TIMES DAILY
Qty: 270 TABLET | Refills: 0 | OUTPATIENT
Start: 2025-05-29